# Patient Record
Sex: MALE | Race: WHITE | Employment: FULL TIME | URBAN - NONMETROPOLITAN AREA
[De-identification: names, ages, dates, MRNs, and addresses within clinical notes are randomized per-mention and may not be internally consistent; named-entity substitution may affect disease eponyms.]

---

## 2018-06-09 ENCOUNTER — HOSPITAL ENCOUNTER (OUTPATIENT)
Dept: LAB | Age: 48
Discharge: HOME | End: 2018-06-09
Attending: FAMILY MEDICINE | Admitting: FAMILY MEDICINE

## 2018-06-09 LAB
ALBUMIN SERPL-MCNC: 4 GM/DL (ref 3.4–5)
ALP BLD-CCNC: 111 IU/L (ref 40–129)
ALT SERPL-CCNC: 27 U/L (ref 10–40)
ANION GAP SERPL CALCULATED.3IONS-SCNC: 11 MMOL/L (ref 4–16)
AST SERPL-CCNC: 18 IU/L (ref 15–37)
BASOPHILS ABSOLUTE: 0.1 K/CU MM
BASOPHILS RELATIVE PERCENT: 0.6 % (ref 0–1)
BILIRUB SERPL-MCNC: 0.3 MG/DL (ref 0–1)
BUN BLDV-MCNC: 10 MG/DL (ref 6–23)
CALCIUM SERPL-MCNC: 8.8 MG/DL (ref 8.3–10.6)
CHLORIDE BLD-SCNC: 102 MMOL/L (ref 99–110)
CHOLESTEROL, FASTING: 148 MG/DL
CO2: 30 MMOL/L (ref 21–32)
CREAT SERPL-MCNC: 0.8 MG/DL (ref 0.9–1.3)
DIFFERENTIAL TYPE: ABNORMAL
EOSINOPHILS ABSOLUTE: 0.5 K/CU MM
EOSINOPHILS RELATIVE PERCENT: 5 % (ref 0–3)
GFR AFRICAN AMERICAN: >60 ML/MIN/1.73M2
GFR NON-AFRICAN AMERICAN: >60 ML/MIN/1.73M2
GLUCOSE FASTING: 110 MG/DL (ref 70–99)
HCT VFR BLD CALC: 52.6 % (ref 42–52)
HDLC SERPL-MCNC: 29 MG/DL
HEMOGLOBIN: 16.7 GM/DL (ref 13.5–18)
IMMATURE NEUTROPHIL %: 0.3 % (ref 0–0.43)
LDL CHOLESTEROL DIRECT: 96 MG/DL
LYMPHOCYTES ABSOLUTE: 2.8 K/CU MM
LYMPHOCYTES RELATIVE PERCENT: 27.6 % (ref 24–44)
MCH RBC QN AUTO: 27.6 PG (ref 27–31)
MCHC RBC AUTO-ENTMCNC: 31.7 % (ref 32–36)
MCV RBC AUTO: 86.8 FL (ref 78–100)
MONOCYTES ABSOLUTE: 0.8 K/CU MM
MONOCYTES RELATIVE PERCENT: 8 % (ref 0–4)
PDW BLD-RTO: 14.5 % (ref 11.7–14.9)
PLATELET # BLD: 274 K/CU MM (ref 140–440)
PMV BLD AUTO: 10.3 FL (ref 7.5–11.1)
POTASSIUM SERPL-SCNC: 4.3 MMOL/L (ref 3.5–5.1)
RBC # BLD: 6.06 M/CU MM (ref 4.6–6.2)
SEGMENTED NEUTROPHILS ABSOLUTE COUNT: 6 K/CU MM
SEGMENTED NEUTROPHILS RELATIVE PERCENT: 58.5 % (ref 36–66)
SODIUM BLD-SCNC: 143 MMOL/L (ref 135–145)
TOTAL IMMATURE NEUTOROPHIL: 0.03 K/CU MM
TOTAL PROTEIN: 6.8 GM/DL (ref 6.4–8.2)
TRIGLYCERIDE, FASTING: 177 MG/DL
WBC # BLD: 10.3 K/CU MM (ref 4–10.5)

## 2020-03-30 ENCOUNTER — HOSPITAL ENCOUNTER (EMERGENCY)
Age: 50
Discharge: ANOTHER HOSPITAL | End: 2020-03-30
Attending: EMERGENCY MEDICINE
Payer: COMMERCIAL

## 2020-03-30 ENCOUNTER — APPOINTMENT (OUTPATIENT)
Dept: CT IMAGING | Age: 50
End: 2020-03-30
Payer: COMMERCIAL

## 2020-03-30 ENCOUNTER — APPOINTMENT (OUTPATIENT)
Dept: GENERAL RADIOLOGY | Age: 50
End: 2020-03-30
Payer: COMMERCIAL

## 2020-03-30 VITALS
WEIGHT: 225 LBS | DIASTOLIC BLOOD PRESSURE: 82 MMHG | BODY MASS INDEX: 32.21 KG/M2 | SYSTOLIC BLOOD PRESSURE: 127 MMHG | TEMPERATURE: 97.7 F | HEIGHT: 70 IN | RESPIRATION RATE: 17 BRPM | OXYGEN SATURATION: 96 % | HEART RATE: 91 BPM

## 2020-03-30 LAB
ALBUMIN SERPL-MCNC: 3.5 GM/DL (ref 3.4–5)
ALP BLD-CCNC: 92 IU/L (ref 40–129)
ALT SERPL-CCNC: 22 U/L (ref 10–40)
ANION GAP SERPL CALCULATED.3IONS-SCNC: 11 MMOL/L (ref 4–16)
AST SERPL-CCNC: 16 IU/L (ref 15–37)
BASOPHILS ABSOLUTE: 0.1 K/CU MM
BASOPHILS RELATIVE PERCENT: 0.7 % (ref 0–1)
BILIRUB SERPL-MCNC: 0.1 MG/DL (ref 0–1)
BUN BLDV-MCNC: 12 MG/DL (ref 6–23)
CALCIUM SERPL-MCNC: 8 MG/DL (ref 8.3–10.6)
CHLORIDE BLD-SCNC: 95 MMOL/L (ref 99–110)
CHP ED QC CHECK: YES
CO2: 24 MMOL/L (ref 21–32)
CREAT SERPL-MCNC: 0.7 MG/DL (ref 0.9–1.3)
DIFFERENTIAL TYPE: ABNORMAL
EOSINOPHILS ABSOLUTE: 0.3 K/CU MM
EOSINOPHILS RELATIVE PERCENT: 4.4 % (ref 0–3)
GFR AFRICAN AMERICAN: >60 ML/MIN/1.73M2
GFR NON-AFRICAN AMERICAN: >60 ML/MIN/1.73M2
GLUCOSE BLD-MCNC: 128 MG/DL
GLUCOSE BLD-MCNC: 128 MG/DL (ref 70–99)
GLUCOSE BLD-MCNC: 134 MG/DL (ref 70–99)
HCT VFR BLD CALC: 28.4 % (ref 42–52)
HEMOGLOBIN: 8.9 GM/DL (ref 13.5–18)
IMMATURE NEUTROPHIL %: 0.3 % (ref 0–0.43)
INR BLD: 0.94 INDEX
LYMPHOCYTES ABSOLUTE: 1.9 K/CU MM
LYMPHOCYTES RELATIVE PERCENT: 24.8 % (ref 24–44)
MCH RBC QN AUTO: 27.3 PG (ref 27–31)
MCHC RBC AUTO-ENTMCNC: 31.3 % (ref 32–36)
MCV RBC AUTO: 87.1 FL (ref 78–100)
MONOCYTES ABSOLUTE: 0.6 K/CU MM
MONOCYTES RELATIVE PERCENT: 7.7 % (ref 0–4)
NUCLEATED RBC %: 0 %
PDW BLD-RTO: 14.6 % (ref 11.7–14.9)
PLATELET # BLD: 150 K/CU MM (ref 140–440)
PMV BLD AUTO: 10.4 FL (ref 7.5–11.1)
POTASSIUM SERPL-SCNC: 3.7 MMOL/L (ref 3.5–5.1)
PROTHROMBIN TIME: 11.4 SECONDS (ref 11.7–14.5)
RBC # BLD: 3.26 M/CU MM (ref 4.6–6.2)
SEGMENTED NEUTROPHILS ABSOLUTE COUNT: 4.8 K/CU MM
SEGMENTED NEUTROPHILS RELATIVE PERCENT: 62.1 % (ref 36–66)
SODIUM BLD-SCNC: 130 MMOL/L (ref 135–145)
TOTAL IMMATURE NEUTOROPHIL: 0.02 K/CU MM
TOTAL NUCLEATED RBC: 0 K/CU MM
TOTAL PROTEIN: 6 GM/DL (ref 6.4–8.2)
TROPONIN T: <0.01 NG/ML
WBC # BLD: 7.7 K/CU MM (ref 4–10.5)

## 2020-03-30 PROCEDURE — 84484 ASSAY OF TROPONIN QUANT: CPT

## 2020-03-30 PROCEDURE — 80053 COMPREHEN METABOLIC PANEL: CPT

## 2020-03-30 PROCEDURE — 85610 PROTHROMBIN TIME: CPT

## 2020-03-30 PROCEDURE — 6360000004 HC RX CONTRAST MEDICATION: Performed by: EMERGENCY MEDICINE

## 2020-03-30 PROCEDURE — 82962 GLUCOSE BLOOD TEST: CPT

## 2020-03-30 PROCEDURE — 71045 X-RAY EXAM CHEST 1 VIEW: CPT

## 2020-03-30 PROCEDURE — 70498 CT ANGIOGRAPHY NECK: CPT

## 2020-03-30 PROCEDURE — 93005 ELECTROCARDIOGRAM TRACING: CPT | Performed by: EMERGENCY MEDICINE

## 2020-03-30 PROCEDURE — 99285 EMERGENCY DEPT VISIT HI MDM: CPT

## 2020-03-30 PROCEDURE — 70450 CT HEAD/BRAIN W/O DYE: CPT

## 2020-03-30 PROCEDURE — 85025 COMPLETE CBC W/AUTO DIFF WBC: CPT

## 2020-03-30 RX ORDER — 0.9 % SODIUM CHLORIDE 0.9 %
50 INTRAVENOUS SOLUTION INTRAVENOUS ONCE
Status: DISCONTINUED | OUTPATIENT
Start: 2020-03-30 | End: 2020-03-31 | Stop reason: HOSPADM

## 2020-03-30 RX ADMIN — IOPAMIDOL 100 ML: 755 INJECTION, SOLUTION INTRAVENOUS at 21:36

## 2020-03-30 ASSESSMENT — PAIN DESCRIPTION - LOCATION: LOCATION: HEAD

## 2020-03-30 ASSESSMENT — PAIN DESCRIPTION - PAIN TYPE: TYPE: ACUTE PAIN

## 2020-03-30 ASSESSMENT — PAIN SCALES - GENERAL: PAINLEVEL_OUTOF10: 6

## 2020-03-31 PROCEDURE — 93010 ELECTROCARDIOGRAM REPORT: CPT | Performed by: INTERNAL MEDICINE

## 2020-03-31 NOTE — ED NOTES
Pt ambulating into ED with wife. Wife speaking for pt. States he has been complaining of headache for the last couple of days and has been more quiet than usual. This evening approx 1hr PTA states he was becoming angry and was attempting to talk slurring and was unable to gather thoughts. States this causing some sentences not making sense. Pt quiet most of triage and attempting to answer questions. No slurred speech noted during this time and occasionally unable to form words he was attempting to say. When asked how much he weighed pt reports 125. When asked if he was 225 he agrees and wife attempted to correct him, pt upset and reports that's what I said.       Perico Chiu RN  03/30/20 2122

## 2020-03-31 NOTE — ED PROVIDER NOTES
Triage Chief Complaint:   Headache and Aphasia (wife reports unable to keep thoughts together)    Pt was seen by physician  I wore eye cover and surgical mask for pt encounter    [place]:  Geraldo Mackey is a 52 y.o. male that presents from home with wife for evaluation of slurred speech, expressive aphasia and headache. Patient's last known well is reported as 1 hour prior to ED presentation, per patient and wife. Patient wife reports that he has had constant headaches for around the last week. No known history of headaches. Today about an hour prior to ED presentation they were sitting at dinner when patient began to have some slurred speech which have since resolved and patient had trouble trying to find words. There was no acute worsening of the headaches associated with this. Patient denies chest pain shortness of breath. No weakness numbness tingling functional or motor deficit otherwise. No other neuro complaints. Patient does have history of high cholesterol and is a current smoker    ROS:  At least 10 systems reviewed and otherwise acutely negative except as in the [address] History reviewed. No pertinent past medical history. History reviewed. No pertinent surgical history.   Family History   Problem Relation Age of Onset    Cancer Mother      Social History     Socioeconomic History    Marital status:      Spouse name: Not on file    Number of children: Not on file    Years of education: Not on file    Highest education level: Not on file   Occupational History    Not on file   Social Needs    Financial resource strain: Not on file    Food insecurity     Worry: Not on file     Inability: Not on file    Transportation needs     Medical: Not on file     Non-medical: Not on file   Tobacco Use    Smoking status: Current Every Day Smoker     Packs/day: 1.00     Years: 20.00     Pack years: 20.00    Smokeless tobacco: Never Used   Substance and Sexual Activity    Alcohol use: No    Drug use: No    Sexual activity: Not on file   Lifestyle    Physical activity     Days per week: Not on file     Minutes per session: Not on file    Stress: Not on file   Relationships    Social connections     Talks on phone: Not on file     Gets together: Not on file     Attends Druze service: Not on file     Active member of club or organization: Not on file     Attends meetings of clubs or organizations: Not on file     Relationship status: Not on file    Intimate partner violence     Fear of current or ex partner: Not on file     Emotionally abused: Not on file     Physically abused: Not on file     Forced sexual activity: Not on file   Other Topics Concern    Not on file   Social History Narrative    Not on file     No current facility-administered medications for this encounter. Current Outpatient Medications   Medication Sig Dispense Refill    Atorvastatin Calcium (LIPITOR PO) Take by mouth daily      erythromycin (ROMYCIN) 5 MG/GM ophthalmic ointment Apply 1 cm ribbon to the lower lid every 6 hours for 7 days 1 Tube 0    sertraline (ZOLOFT) 50 MG tablet Take 50 mg by mouth daily.  Simvastatin (ZOCOR PO) Take  by mouth. No Known Allergies    Nursing Notes Reviewed    Physical Exam:  ED Triage Vitals   Enc Vitals Group      BP 03/30/20 2114 (!) 167/96      Pulse 03/30/20 2114 91      Resp 03/30/20 2114 17      Temp --       Temp src --       SpO2 03/30/20 2114 96 %      Weight 03/30/20 2106 225 lb (102.1 kg)      Height 03/30/20 2106 5' 10\" (1.778 m)      Head Circumference --       Peak Flow --       Pain Score --       Pain Loc --       Pain Edu? --       Excl. in [address]? --      GENERAL APPEARANCE: Awake and alert. Cooperative. HEAD: Normocephalic. Atraumatic. EYES: EOM's grossly intact. Sclera anicteric. ENT: Mucous membranes are moist. Tolerates saliva. No trismus. NECK: Supple. No meningismus. Trachea midline. HEART: RRR. Radial pulses 2+. LUNGS: Respirations unlabored. CTAB  ABDOMEN: Soft. Non-tender. No guarding or rebound. EXTREMITIES: No acute deformities. SKIN: Warm and dry. NEUROLOGICAL:   Brooke Wisdom's NIH Stroke Scale at 9:20 PM is:  Level of Consciousness:  0 - alert; keenly responsive    LOC Questions:  0 - answers both questions correctly    LOC Commands:  0 - performs both tasks correctly    Best Gaze:  0 - normal    Visual Fields:  0 - no visual loss    Facial Palsy:  0 - normal symmetric movement    Motor-Arm-Left:  0 - no drift, limb holds 90 (or 45) degrees for full 10 seconds    Motor-Leg-Left:  0 - no drift; leg holds 30 degree position for full 5 seconds    Motor-Arm-Right:  0 - no drift, limb holds 90 (or 45) degrees for full 10 seconds    Motor-Leg-Right:  0 - no drift; leg holds 30 degree position for full 5 seconds    Limb Ataxia:  0 - absent    Sensory:  0 - normal; no sensory loss    Best Language:  1 - mild to moderate aphasia; some obvious loss of fluency or facility of comprehension without significant limitation on ideas expressed or form of expression. Reduction of speech and/or comprehension, however, makes conversation about provided materials difficult or impossible. For example, in conversation about provided materials, examiner can identify picture or naming card content from patient's response. Dysarthria:  0 - normal    Extinction and Inattention:  0 - no abnormality  PSYCHIATRIC: Normal mood.     I have reviewed and interpreted all of the currently available lab results from this visit (if applicable):  Results for orders placed or performed during the hospital encounter of 03/30/20   CBC Auto Differential   Result Value Ref Range    WBC 7.7 4.0 - 10.5 K/CU MM    RBC 3.26 (L) 4.6 - 6.2 M/CU MM    Hemoglobin 8.9 (L) 13.5 - 18.0 GM/DL    Hematocrit 28.4 (L) 42 - 52 %    MCV 87.1 78 - 100 FL    MCH 27.3 27 - 31 PG    MCHC 31.3 (L) 32.0 - 36.0 %    RDW 14.6 11.7 - 14.9 %    Platelets 636 832 - 158 K/CU MM    MPV 10.4 7.5 - 11.1 FL Differential Type AUTOMATED DIFFERENTIAL     Segs Relative 62.1 36 - 66 %    Lymphocytes % 24.8 24 - 44 %    Monocytes % 7.7 (H) 0 - 4 %    Eosinophils % 4.4 (H) 0 - 3 %    Basophils % 0.7 0 - 1 %    Segs Absolute 4.8 K/CU MM    Lymphocytes Absolute 1.9 K/CU MM    Monocytes Absolute 0.6 K/CU MM    Eosinophils Absolute 0.3 K/CU MM    Basophils Absolute 0.1 K/CU MM    Nucleated RBC % 0.0 %    Total Nucleated RBC 0.0 K/CU MM    Total Immature Neutrophil 0.02 K/CU MM    Immature Neutrophil % 0.3 0 - 0.43 %   Comprehensive Metabolic Panel w/ Reflex to MG   Result Value Ref Range    Sodium 130 (L) 135 - 145 MMOL/L    Potassium 3.7 3.5 - 5.1 MMOL/L    Chloride 95 (L) 99 - 110 mMol/L    CO2 24 21 - 32 MMOL/L    BUN 12 6 - 23 MG/DL    CREATININE 0.7 (L) 0.9 - 1.3 MG/DL    Glucose 134 (H) 70 - 99 MG/DL    Calcium 8.0 (L) 8.3 - 10.6 MG/DL    Alb 3.5 3.4 - 5.0 GM/DL    Total Protein 6.0 (L) 6.4 - 8.2 GM/DL    Total Bilirubin 0.1 0.0 - 1.0 MG/DL    ALT 22 10 - 40 U/L    AST 16 15 - 37 IU/L    Alkaline Phosphatase 92 40 - 129 IU/L    GFR Non-African American >60 >60 mL/min/1.73m2    GFR African American >60 >60 mL/min/1.73m2    Anion Gap 11 4 - 16   Troponin   Result Value Ref Range    Troponin T <0.010 <0.01 NG/ML   Protime-INR   Result Value Ref Range    Protime 11.4 (L) 11.7 - 14.5 SECONDS    INR 0.94 INDEX   POCT Glucose   Result Value Ref Range    Glucose 128 mg/dL    QC OK?  yes    POCT Glucose   Result Value Ref Range    POC Glucose 128 (H) 70 - 99 MG/DL   EKG 12 Lead   Result Value Ref Range    Ventricular Rate 83 BPM    Atrial Rate 83 BPM    P-R Interval 152 ms    QRS Duration 94 ms    Q-T Interval 356 ms    QTc Calculation (Bazett) 418 ms    P Axis 77 degrees    R Axis 82 degrees    T Axis 89 degrees    Diagnosis       Normal sinus rhythm  Normal ECG  No previous ECGs available  Confirmed by NIKI Chambers ([id number]) on 3/31/2020 5:33:40 PM          Radiographs (if obtained):  [] The following radiograph was interpreted by myself in the absence of a radiologist:  [x] Radiologist's Report Reviewed:  Ct Head Wo Contrast    Addendum Date: 3/30/2020    ADDENDUM: After further review of the images, there appears to be a small amount of subarachnoid hemorrhage within the left frontal lobe (image 46 series 2). These findings were relayed to Alejo Lee at 9:53 p.m. Result Date: 3/30/2020  EXAMINATION: CT OF THE HEAD WITHOUT CONTRAST  3/30/2020 9:32 pm TECHNIQUE: CT of the head was performed without the administration of intravenous contrast. Dose modulation, iterative reconstruction, and/or weight based adjustment of the mA/kV was utilized to reduce the radiation dose to as low as reasonably achievable. COMPARISON: None. HISTORY: ORDERING SYSTEM PROVIDED HISTORY: aphasia TECHNOLOGIST PROVIDED HISTORY: Has a \"code stroke\" or \"stroke alert\" been called? ->Yes Reason for exam:->aphasia Reason for Exam: slurred spech FINDINGS: BRAIN/VENTRICLES: There is no acute intracranial hemorrhage, mass effect or midline shift. No abnormal extra-axial fluid collection. The gray-white differentiation is maintained without evidence of an acute infarct. There is no evidence of hydrocephalus. Atherosclerotic changes of the intracranial vasculature. ORBITS: The visualized portion of the orbits demonstrate no acute abnormality. SINUSES: The visualized paranasal sinuses and mastoid air cells demonstrate mild mucosal thickening of the bilateral sphenoid sinuses. Hypoplasia of the frontal sinuses. SOFT TISSUES/SKULL:  No acute abnormality of the visualized skull or soft tissues. No acute intracranial abnormality. Findings were discussed with MONICA CORDOBA at 9:41 pm on 3/30/2020. Xr Chest Portable    Result Date: 3/30/2020  EXAMINATION: ONE XRAY VIEW OF THE CHEST 3/30/2020 9:35 pm COMPARISON: None.  HISTORY: ORDERING SYSTEM PROVIDED HISTORY: stroke alert TECHNOLOGIST PROVIDED HISTORY: Reason for exam:->stroke alert Reason for Exam: stroke alert Acuity: Acute Type of Exam: Initial Additional signs and symptoms: na Relevant Medical/Surgical History: na FINDINGS: HEART/MEDIASTINUM: The cardiomediastinal silhouette is within normal limits. PLEURA/LUNGS: There are no focal consolidations or pleural effusions. There is no appreciable pneumothorax. BONES/SOFT TISSUE: No acute abnormality. No radiographic evidence of acute pulmonary disease. Cta Head Neck W Contrast    Result Date: 3/30/2020  EXAMINATION: CTA OF THE HEAD AND NECK WITH CONTRAST 3/30/2020 9:36 pm: TECHNIQUE: CTA of the head and neck was performed with the administration of intravenous contrast. Multiplanar reformatted images are provided for review. MIP images are provided for review. Stenosis of the internal carotid arteries measured using NASCET criteria. Dose modulation, iterative reconstruction, and/or weight based adjustment of the mA/kV was utilized to reduce the radiation dose to as low as reasonably achievable. COMPARISON: None. HISTORY: ORDERING SYSTEM PROVIDED HISTORY: expressive aphasia TECHNOLOGIST PROVIDED HISTORY: Reason for exam:->expressive aphasia Reason for Exam: stroke aler FINDINGS: CTA NECK: AORTIC ARCH/ARCH VESSELS: No dissection or arterial injury. No significant stenosis of the brachiocephalic or subclavian arteries. CAROTID ARTERIES: No dissection, arterial injury, or hemodynamically significant stenosis by NASCET criteria. VERTEBRAL ARTERIES: No dissection, arterial injury, or significant stenosis. SOFT TISSUES: There is pulmonary emphysema. BONES: No acute osseous abnormality. CTA HEAD: This portion of the study is extremely limited by suboptimal contrast bolus. The hmumhr-th-Tuidor appears to be diffusely attenuated. A significant portion of the M1 segment of the left middle cerebral artery, in particular, is not well visualized. There appears to be subarachnoid blood within the high left frontal lobe.      No flow limiting stenosis or large vessel occlusion occasionally words are mis-transcribed.)    CRUZ Flowers PA-C  04/01/20 2021

## 2020-03-31 NOTE — ED PROVIDER NOTES
Emergency [address] EMERGENCY DEPARTMENT    Patient: Roselia Mcgowan  MRN: [id number]  : 1970  Date of Evaluation: 3/30/2020  ED Supervising Physician: Ras Sevilla MD    I independently examined and evaluated Roselia Mcgowan. In brief, Roselia Mcgowan is a 52 y.o. male that presents to the emergency department after onset of difficulty with speech around 8 PM.  He has been having intermittent headaches for about a week as well. Focused exam: Expressive aphasia. No dysarthria. No gaze palsy. Moves all extremities. No gross sensory deficits. Pupils equal and reactive. Brief ED course/MDM: Patient presents with concerns for acute CVA based on findings of expressive aphasia. Vital signs are appropriate at this time. Glucose within appropriate ranges. CT head does not show any evidence of obvious mass or acute bleed. On reevaluation, the patient's symptoms appear to have rapidly resolved. He is no longer having any word finding difficulty and states that he does feel better. TPA is held at bedside pending [facility] neurology consultation but anticipate the patient will be admitted here at [facility] for TIA work-up. Radiology called back and stated that they were concerned about a possible subarachnoid hemorrhage in the left frontal region. Patient's blood pressure now is in the 120s over 80s. He is still asymptomatic. He will be transferred to [facility] for urgent evaluation. 12 lead EKG as interpreted by me reveals normal sinus rhythm. Axis is normal. There are no ischemic ST elevations or other suspicious ST changes;  QRS interval is narrow, QT interval is not prolonged. Final interpretation: Normal sinus rhythm. I directly delivered medical care to this critically ill patient. Timely evaluation and treatment was necessary to address the significant organ system dysfunction present in this patient.  Due to high probability of clinically significant, life-threatening deterioration, the patient required my highest level of preparedness to intervene emergently and I personally spent this critical care time directly and personally managing the patient. I was involved in the stabilization of this critical patient for 32 minutes. During this time I was physically present at the bedside during my initial exam and for re-examinations at intervals coordinating this patient's care with other physicians, examining radiographs, interpreting electrocardiograms and rhythm strips, reviewing laboratory results, discussing the patient's condition and management with the patient/family. Time billed does not include time for procedures. All diagnostic, treatment, and disposition decisions were made by myself in conjunction with the MARIAN. For all further details of the patient's emergency department visit, please see their documentation.     (Please note that portions of this note may have been completed with a voice recognition program. Efforts were made to edit the dictations but occasionally words are mis-transcribed.)    MD Uma Tyson MD  03/30/20 9897

## 2020-04-06 LAB
EKG ATRIAL RATE: 83 BPM
EKG DIAGNOSIS: NORMAL
EKG P AXIS: 77 DEGREES
EKG P-R INTERVAL: 152 MS
EKG Q-T INTERVAL: 356 MS
EKG QRS DURATION: 94 MS
EKG QTC CALCULATION (BAZETT): 418 MS
EKG R AXIS: 82 DEGREES
EKG T AXIS: 89 DEGREES
EKG VENTRICULAR RATE: 83 BPM

## 2020-07-13 ENCOUNTER — HOSPITAL ENCOUNTER (OUTPATIENT)
Dept: OCCUPATIONAL THERAPY | Age: 50
Setting detail: THERAPIES SERIES
Discharge: HOME | End: 2020-07-13
Payer: COMMERCIAL

## 2020-07-13 PROCEDURE — 97165 OT EVAL LOW COMPLEX 30 MIN: CPT

## 2020-07-13 ASSESSMENT — 9 HOLE PEG TEST
TESTTIME_SECONDS: 22
TESTTIME_SECONDS: 26

## 2020-07-13 NOTE — PROGRESS NOTES
(degrees)  RUE AROM : WNL  Right Hand AROM (degrees)  Right Hand AROM: WNL  LUE Strength  Gross LUE Strength:  WNL  RUE Strength  Gross RUE Strength: WNL     Hand Dominance  Hand Dominance: Right  Left Hand Strength -  (lbs)  Handle Setting 2: 61.7  Right Hand Strength -  (lbs)  Handle Setting 1: 68.2  Fine Motor Skills  Left 9 Hole Peg Test Time (secs): 22  Right 9 Hole Peg Test Time (secs): 26        Assessment   Assessment  Assessment: No OT needs identifed  Decision Making: Low Complexity  History: DM  Exam: no performance deficits  Assistance / Modification: independent with ADL/ mobility  REQUIRES OT FOLLOW UP: No  No Skilled OT: Independent with ADL's;No OT goals identified  Discharge Recommendations: Defer OT at this time       Plan : Discharge OT     OutComes Score  9 hole peg test      Therapy Time   Individual Concurrent Group Co-treatment   Time In 1430         Time Out 1500         Minutes 30                 Arielle Huber OTR/L

## 2020-07-20 ENCOUNTER — HOSPITAL ENCOUNTER (OUTPATIENT)
Dept: SPEECH THERAPY | Age: 50
Setting detail: THERAPIES SERIES
Discharge: HOME | End: 2020-07-20
Payer: COMMERCIAL

## 2020-07-20 PROCEDURE — 92523 SPEECH SOUND LANG COMPREHEN: CPT

## 2020-07-20 NOTE — PROGRESS NOTES
Speech Language Pathology  Facility/Department: [facility] SPEECH THERAPY  Initial Assessment    NAME: Randi Herrmann  : 1970  MRN: [id number]    Date of Eval: 2020  Evaluating Therapist:  Abelardo Blank MS CCC-SLP    Visit Diagnoses       Codes    Subarachnoid hemorrhage ([facility] [address]    -  Primary I60.9    Cognitive impairment     R41.89            Past Medical History: has no past medical history on file. Past Surgical History:  has no past surgical history on file. Primary Complaint: \" I'm having difficulty multi tasking and thinking of the right word to say\". Pain: no pain       Assessment:  Cognitive Diagnosis: Mild impairments of short term memory and attention. Executive function skills to be assessed in treatment  Aphasia Diagnosis: Mild expressive aphasia effecting word retrieval within connected speech. Diagnosis: David Shah exhibits mild impairments of short term memory. Attention is impaired and will be assessed in treatment to address the starting point of treatment. Mild expressive aphasia is characterized by difficulties with word retrieval within connected speech.  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  IADL History  Active : Yes  Mode of Transportation: Car;Truck  Education: graduate of high school  Occupation: Full time employment  Type of occupation:  for [employer] [address]: [address] Exceptions: Wears glasses for reading           Objective:     Oral/Motor  Oral Motor: Within functional limits  Auditory Comprehension  Comprehension: Within Functional Limits     Expression  Primary Mode of Expression: Verbal  Verbal Expression  Verbal Expression: Exceptions to functional limits  Divergent: To be assessed in therapy  Conversation: Mild  Interfering Components: Impaired thought organization  Effective Techniques: Word retrived strategies  Written Expression  Written Expression: (to assessed in treatment)  Motor Speech  Motor Speech: Within Functional Limits  Pragmatics/Social Functioning  Pragmatics: Within functional limits       Cognition  Orientation  Overall Orientation Status: Within Normal Limits  Attention  Attention: (patient reports difficulty with completing tasks with many parts. Will assess attention skills focusing on divided attention.)  Memory  Memory: Exceptions to [facility]  Short-term Memory: Mild  Working Memory: To be assessed in therapy  Problem Solving  Problem Solving: Within Functional Limits  Abstract Reasoning  Abstract Reasoning: Within Functional Limits  Safety/Judgement  Safety/Judgement: Within Functional Limits    Additional Assessments:    Objective:  CLQT (Cognitive Linguistic Quick Test) was administered which assesses the areas listed below. Results were as follows:    Domain Score Severity   Attention 197 WNL   Memory 150 Mild   Executive Function 31 WNL   Language 32 WNL   Visuospatial Skills 93 WNL   Clock Drawing Severity 13 WNL      Composite Severity Rating: 3.8- WNL    Although composite severity is WNL, Selina Crews exhibits impairments of attention and memory as well as word retrieval within connected speech that this assessment was not sensitive to. Within treatment, attention, word retrieval and executive function will be assessed in depth to target areas of impairment        Plan:    Goals:   Short-term Goals  Timeframe for Short-term Goals: 3 months  Goal 1:  Will demonstrate an improvement of word retrieval as measured by accurate word choice within connected speech with 95% or better accuracy within a 15 minute or longer connected speech sample. Goal 2: Attention will improve to the divided attention level for functional daily tasks relating to personal, social and vocational skills  Goal 3: Will demonstrate intact executive function skills for daily activities relating to personal, social and vocational skills  Goal 4: Will demonstrate an improvement of short term memory skills as measured by recall of three tasks to complete given a 15 minute time interval  Long-term Goals  Timeframe for Long-term Goals: 3 months  Goal 1: Functional cognitive and language skills for daily activities relating to personal, social and vocational activities  Speech Therapy Prognosis  Prognosis: Good  Prognosis Considerations: Age, Previous Level of Function, Severity of Impairments, Participation Level  Duration/Frequency of Treatment  Duration/Frequency of Treatment: 2x week for 12 weeks  Recommendations  Requires SLP Intervention: Yes  Patient Education: results of assessment, goals and plan of care were discussed with Ant Medellin . Patient Education Response: Verbalizes understanding  Requires SLP Intervention: Yes  Patient/family involved in developing goals and treatment plan: yes          Follow Up: Follow up in: 10 days as requested by patient due to his schedule. [facility].  MS Ambrose, CCC-SLP  Speech/Language Pathologist  7/20/2020  6:10 PM

## 2020-07-30 ENCOUNTER — HOSPITAL ENCOUNTER (OUTPATIENT)
Dept: SPEECH THERAPY | Age: 50
Setting detail: THERAPIES SERIES
Discharge: HOME | End: 2020-07-30
Payer: COMMERCIAL

## 2020-07-30 PROCEDURE — 97130 THER IVNTJ EA ADDL 15 MIN: CPT

## 2020-07-30 PROCEDURE — 97129 THER IVNTJ 1ST 15 MIN: CPT

## 2020-07-30 NOTE — FLOWSHEET NOTE
[][facility] [address]fr[address]      [facility] [address] Dept          Outpatient Rehab Dept     [address]       [facility] [address], [address], [address]       Terrell Ivy 61     [phone] [phone] [phone]  [][facility] [address]           Outpatient Speech Dept. [address] [address]           [phone] 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     Insurance/Certification information: [facility] [place]  Referring Physician:   RUFINO Meyers   Plan of care signed (Y/N):  Not yet       Treatment Provided: cognitive treatment    Date 2020    Time in/Time out 10:01-10:50AM    Total Timed Code Min 50 minutes    Total Treatment Minutes 50 minutes    Units  G Code applied: 3 cognitive    Subjective     Alert and cooperative    Pain level: No pain    Visit# / total visits:      GOALS:     Goal 1: Will demonstrate an improvement of word retrieval as measured by accurate word choice within connected speech with 95% or better accuracy within a 15 minute or longer connected speech sample. Provided instruction regarding evidence based treatment approach -Semantic Feature Approach.  Amara Mogran that treatment will focus on the abstract meaning of words to facilitate improve word retrieval skills. Provided examples of the abstract meaning of the word \"cold\". He was able to generate two abstract meanings of this word with minimal cues and one abstract meaning with a moderate cue. Goal 2: Attention will improve to the divided attention level for functional daily tasks relating to personal, social and vocational skills Trail making task was completed promptly for 20 steps/sequences for this task. Provided instruction regarding everyday tasks that require divided attention. Will continue this goal with functional tasks    Goal 3: Will demonstrate intact executive function skills for daily activities relating to personal, social and vocational skills Deductive reasoning task was presented - highest difficulty level (4). Smiley Oedn completed task in which he needed to assign 11 persons to a seating chart with accuracy for 1 of the 11 guests. Will continue with deductive reasoning and executive function tasks. Goal 4: Will demonstrate an improvement of short term memory skills as measured by recall of three tasks to complete given a 15 minute time interval Provided instruction and a handout regarding the Spaced Retrieval Strategy to facilitate short term memory skills. Given two tasks to recall and a 7 minute time interval, Smiley Oden recalled both tasks promptly. At the next 15 minute interval both tasks were recalled promptly and accurately. Will increase to three tasks to recall at next session.     Effective Strategies that Improve fx: Use of spaced retrieval to facilitate short term memory skills, use of word retrieval activities to expand word associations for abstract word meanings, completion of divided attention and executive function activities to further develop these skills    Barriers to progress: none    Education completed:     Goals of treatment, plan of care and home exercise activities     Home Exercise Plan:     Word retrieval, use of spaced retrieval to facilitate short term memory skills, deduction reasoning tasks      Objective Findings: see above      Interventions used this date:  [] Speech Treatment       [x] Instruction in HEP  [] Group   [] Dysphagia Treatment   [x] Cognitive Skill Hugo  [] Motor  [] Voice Treatment       []  AAC  Other:      Communication with other providers: none    Adverse Reactions to treatment: none     Treatment/Activity Tolerance:     [x]  Patient tolerated treatment well []  Patient limited by fatique    []  Patient limited by pain []  Patient limited by other medical complications   []  Other:     Patient Requires Follow-up:  [x]  Yes  []  No    Plan: [x]  Continue per plan of care []  Alter current plan (see comments)   [x]  Plan of care initiated []  Hold pending MD visit []  Discharge    Plan for Next Session:  Continue plan of care    Electronically signed by:    Alverto Dunbar.  MS Ambrose, CCC-SLP  Speech/Language Pathologist  7/30/2020  12:46 PM

## 2020-08-03 ENCOUNTER — HOSPITAL ENCOUNTER (OUTPATIENT)
Dept: SPEECH THERAPY | Age: 50
Setting detail: THERAPIES SERIES
Discharge: HOME | End: 2020-08-03
Payer: COMMERCIAL

## 2020-08-03 PROCEDURE — 97130 THER IVNTJ EA ADDL 15 MIN: CPT

## 2020-08-03 PROCEDURE — 97129 THER IVNTJ 1ST 15 MIN: CPT

## 2020-08-03 NOTE — FLOWSHEET NOTE
-Semantic Feature Approach. Instructed Debora Aponte that treatment will focus on the abstract meaning of words to facilitate improve word retrieval skills. Provided examples of the abstract meaning of the word \"cold\". He was able to generate two abstract meanings of this word with minimal cues and one abstract meaning with a moderate cue. Debora Aponte completed home practice worksheet as instructed for abstract categories- generating 5 items for categories such as \"soft\". He generated 2-5 items depending on the difficulty of the category. With additional cues, he was able to generate additional items. Within the session, he stated the similarity between three abstract items promptly and added another item promptly across five trials. Completion of analogies was accurate with 80% consistency for word choice and promptness of response. For delayed trials, he was able to generate the accurate response with a minimal cue. Goal 2: Attention will improve to the divided attention level for functional daily tasks relating to personal, social and vocational skills Trail making task was completed promptly for 20 steps/sequences for this task. Provided instruction regarding everyday tasks that require divided attention. Will continue this goal with functional tasks Divided attention task of listening to a weather report, recalling the forecast while completing a task in which he had to cross off a target symbol in a grid was challenging for Debora Aponte. He reported that he was unable to recall the details of the weather report. He did cross off the correct symbols in the grid. Will target attention at the alternating attention level at the next session. Goal 3: Will demonstrate intact executive function skills for daily activities relating to personal, social and vocational skills Deductive reasoning task was presented - highest difficulty level (4).  Debora Aponte completed task in which he needed to assign 11 persons to a seating chart with accuracy for 1 of the 11 guests. Will continue with deductive reasoning and executive function tasks. Smiley Oden completed a low level difficulty task in which he had to assign items for 7 neighbors and himself to bring to a pot luck based upon constraints contained in eight statements. He completed this task accurately and within an acceptable time interval. Completion of a level two task in which he had to schedule five different single and recurring appointments (OT, PT, ST, Lab draw and Dr) for a week given constraints was completed accurately and in a timely fashion. Goal 4: Will demonstrate an improvement of short term memory skills as measured by recall of three tasks to complete given a 15 minute time interval Provided instruction and a handout regarding the Spaced Retrieval Strategy to facilitate short term memory skills. Given two tasks to recall and a 7 minute time interval, Smiley Oden recalled both tasks promptly. At the next 15 minute interval both tasks were recalled promptly and accurately. Will increase to three tasks to recall at next session.  GOAL MET       Smiley Oden recalled three tasks that he needed to complete with promptness and accuracy given a 15 minute time interval.   Effective Strategies that Improve fx: Use of spaced retrieval to facilitate short term memory skills, use of word retrieval activities to expand word associations for abstract word meanings, completion of divided attention and executive function activities to further develop these skills Use of spaced retrieval to facilitate short term memory skills, use of word retrieval activities to expand word associations for abstract word meanings, completion of divided attention and executive function activities to further develop these skills   Barriers to progress: none none   Education completed:     Goals of treatment, plan of care and home exercise activities  Goals of treatment, plan of care and home exercise activities were discussed

## 2020-08-10 ENCOUNTER — HOSPITAL ENCOUNTER (OUTPATIENT)
Dept: SPEECH THERAPY | Age: 50
Setting detail: THERAPIES SERIES
Discharge: HOME | End: 2020-08-10
Payer: COMMERCIAL

## 2020-08-10 PROCEDURE — 97129 THER IVNTJ 1ST 15 MIN: CPT

## 2020-08-10 PROCEDURE — 97130 THER IVNTJ EA ADDL 15 MIN: CPT

## 2020-08-10 NOTE — FLOWSHEET NOTE
[][facility] [address]fr[address]      [place] [facility] [address] Dept          Outpatient Rehab Dept     [address]       [facility] [address], [address], [address]       Terrell Landrum [address]     [phone] [phone] [phone]  [][facility] Agustinrânio [address]           Outpatient Speech Dept. 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 I69.011                                                              Aphasia following nontraumatic subarachnoid hemorrhage U95.657     Insurance/Certification information: [facility] [place]  Referring Physician:   RUFINO Vu   Plan of care signed (Y/N):  Not yet       Treatment Provided: cognitive treatment    Date 7- 8-3-2020 8-6-2020 8-   Time in/Time out 10:01-10:51AM 10:02-10:57AM 10:02-10:50AM 10:01-10:55AM   Total Timed Code Min 50 minutes 55 minutes 48 minutes 54 minutes   Total Treatment Minutes 50 minutes 55 minutes 48 minutes 54 minutes   Units  G Code applied: 3 cognitive 4 cognitive 3 cognitive  4 cognitive    Subjective     Alert and cooperative Alert and cooperative Alert and cooperative Alert and cooperative   Pain level: No pain No pain No pain No pain   Visit# / total visits:  /   1/1 2/2 3/3 4/4   GOALS:       Goal 1:  Will demonstrate an improvement of word retrieval as measured by accurate word choice within connected speech with 95% or better accuracy within a 15 minute or longer connected speech sample. Provided instruction regarding evidence based treatment approach -Semantic Feature Approach. Instructed Linnea Rothman that treatment will focus on the abstract meaning of words to facilitate improve word retrieval skills. Provided examples of the abstract meaning of the word \"cold\". He was able to generate two abstract meanings of this word with minimal cues and one abstract meaning with a moderate cue. Linnea Rothman completed home practice worksheet as instructed for abstract categories- generating 5 items for categories such as \"soft\". He generated 2-5 items depending on the difficulty of the category. With additional cues, he was able to generate additional items. Within the session, he stated the similarity between three abstract items promptly and added another item promptly across five trials. Completion of analogies was accurate with 80% consistency for word choice and promptness of response. For delayed trials, he was able to generate the accurate response with a minimal cue. Linnea Rothman completed home practice activities to improve word retrieval skills with 90% accuracy for adding item to a series of three abstract words, completing analogies and generating 3 or more items for abstract categories such as \"hot\". Within the treatment session he stated opposites promptly and accurately. No noticeable word retrieval errors were noted during this treatment session. Linnea Rothman completed word retrieval activities to facilitate improved word word retrieval skills within connected speech. He completed activities such as stating opposites, adding an item to abstract categories complete analogies and  stating 3-5 items within abstract categories such as \"turn\" and \"heavy\". He generated both concrete and abstract items with reported little difficulty.  Within a 15 minute speech sample, he demonstrated accurate word choice with 100% consistency. Chase Ac reported that he rarely has difficulty with word choice the past week. GOAL MET. Goal 2: Attention will improve to the divided attention level for functional daily tasks relating to personal, social and vocational skills Trail making task was completed promptly for 20 steps/sequences for this task. Provided instruction regarding everyday tasks that require divided attention. Will continue this goal with functional tasks Divided attention task of listening to a weather report, recalling the forecast while completing a task in which he had to cross off a target symbol in a grid was challenging for Chase Ac. He reported that he was unable to recall the details of the weather report. He did cross off the correct symbols in the grid. Will target attention at the alternating attention level at the next session. Alternating attention level for math- two digit addition and subtraction-  prompt and accurate for 30 problems alternating for one row of addition and then one row of subtraction across three trials. Divided attention-  game Janette Chaves in which Chase Ac had to note when six cards of the same fruit were displayed and then hit a bell before his opponent hit the bell. Across 10 trials of the six of the same fruit appearing, he hit the bell before the therapist with 90% consistency- intact divided attention skills for this game in which quick and accurate response is needed. 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 Divided attention attention was intact for several activities which included determining the rule that there therapist was employing during card sorting task, I.e. \"all even cards\", 'all face cards\". \"all even red cards\". He completed trail making tasks with promptness and accuracy.  Chase Ac reported that he has been taking daily drives and employing a scanning the environment strategy. He reported that he feels that his attention skills are similar to his before stroke levels. GOAL MET   Goal 3: Will demonstrate intact executive function skills for daily activities relating to personal, social and vocational skills Deductive reasoning task was presented - highest difficulty level (4). Nathalie Norris completed task in which he needed to assign 11 persons to a seating chart with accuracy for 1 of the 11 guests. Will continue with deductive reasoning and executive function tasks. Nathalie Norris completed a low level difficulty task in which he had to assign items for 7 neighbors and himself to bring to a pot luck based upon constraints contained in eight statements. He completed this task accurately and within an acceptable time interval. Completion of a level two task in which he had to schedule five different single and recurring appointments (OT, PT, ST, Lab draw and Dr) for a week given constraints was completed accurately and in a timely fashion. Nathalie Norris reported that he feels that he has been completing everyday daily tasks accurately and in a timely fashion. 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 He reported that when he attempted to complete this task a month ago, he experienced difficulty with delay. He reported that his wife has said, \"you are doing really well, much better than a few weeks ago. \" Nathalie Norris reported that he continues to complete daily activities with success. Within the session, he scheduled tasks to complete given constraints for the tasks and time interval/schedule available across two trials. Scheduling was completed in a timely and accurate fashion. GOAL MET      Goal 4:  Will demonstrate an improvement of short term memory skills as measured by recall of three tasks to complete given a 15 minute time interval Provided instruction and a

## 2020-08-10 NOTE — PROGRESS NOTES
rarely has difficulty with word choice the past week. GOAL MET. Goal 2: Attention will improve to the divided attention level for functional daily tasks relating to personal, social and vocational skills- Beni Lopez completed several divided attention tasks within the treatment sessions with accuracy and promptness of response. He reported that he consistently employs a \"scanning the environment\" as he drives. He reported that he feels that his attention skills are similar to his before stroke levels. GOAL MET    Goal 3: Will demonstrate intact executive function skills for daily activities relating to personal, social and vocational skills-  Beni Lopez reported that he continues to complete daily activities with success. Within the last treatment session, he scheduled tasks to complete given constraints for the tasks and time interval/schedule available across two trials. Scheduling was completed in a timely and accurate fashion. GOAL MET    Goal 4: Will demonstrate an improvement of short term memory skills as measured by recall of three tasks to complete given a 15 minute time interval- Goal met at the second treatment session for recall of  three tasks given a 15 minute time interval. GOAL MET    Frequency/Duration:  # Days per week: [] 1 day # Weeks: [] 1 week [] 4 weeks      [x] 2 days? [x] 2 weeks [] 5 weeks     [] 3 days   [] 3 weeks [] 6 weeks     Rehab Potential: [] Excellent [x] Good [] Fair  [] Poor     Goal Status:  [x] Achieved [] Partially Achieved  [] Not Achieved     Patient Status: [] Continue per initial plan of Care     [x] Patient now discharged- treatment goals met       Electronically signed by:    Mayela Boggs MS, CCC-SLP  Speech/Language Pathologist  8/10/2020  3:44 PM

## 2020-08-17 ENCOUNTER — OFFICE VISIT (OUTPATIENT)
Dept: ORTHOPEDIC SURGERY | Age: 50
End: 2020-08-17
Payer: COMMERCIAL

## 2020-08-17 VITALS
OXYGEN SATURATION: 96 % | HEIGHT: 70 IN | HEART RATE: 76 BPM | WEIGHT: 225 LBS | BODY MASS INDEX: 32.21 KG/M2 | RESPIRATION RATE: 16 BRPM

## 2020-08-17 PROCEDURE — 99203 OFFICE O/P NEW LOW 30 MIN: CPT | Performed by: PHYSICIAN ASSISTANT

## 2020-08-17 ASSESSMENT — ENCOUNTER SYMPTOMS
RESPIRATORY NEGATIVE: 1
ALLERGIC/IMMUNOLOGIC NEGATIVE: 1
GASTROINTESTINAL NEGATIVE: 1
EYES NEGATIVE: 1

## 2020-08-17 NOTE — PROGRESS NOTES
Hearing is intact. Lymph:  no lymphedema noted in bilateral lower extremities     Skin: intact in bilateral lower extremities with no ulcerations, lesions, rash, erythema. Vascular: There are no varicosities in bilateral lower extremities, sensation present to light touch over bilateral lower extremities. Capillary refill less than 3. Musculoskeletal:   right leg/knee exam:  Inspection:      3x3 cm mass noted on the lateral aspect of the right knee  Leg alignment:     neutral  Quadriceps/hamstring atrophy:   no  Knee effusion:    mild, no   Knee erythema:   no  ROM:     Full 0-150 degrees  Posterior drawer:   no  Lateral patella glide at 30 deg's: 20%       Medial patella glide at 30 deg's: 10mm  Varus laxity at 0 and 30 deg's: no  Valguslaxity at 0 and 30 deg's: no  Recurvatum:    no  Tenderness at:   Minimal tenderness over the mass on the lateral aspect of the right knee. Knee strength is 5/5 flexion and extension. The patient can dorsiflex and plantarflex his right ankle. Patient can raise great toe. No pain is elicited with internal/external rotation of the right hip. Imaging: The patient recently had an x-ray of his right knee completed at [facility]. Patient declined an x-ray in the clinic today. An impression of the report obtained on 08/12/2020 was faxed to the clinic today. Per the report, the impression was noted to be:   1. Mild degenerative changes within the lateral joint compartment of the knee  2. Lateral soft tissue swelling. Correlate with MRI if clinically appropriate. Impression:  Right knee mass      Plan:    The patient presented to the clinic today for evaluation of a mass on the lateral  aspect of the patient's right knee. On physical exam, patient was minimally tender to palpation over the 3 x 3 cm mass. Patient was able to fully extend and flex the right knee.  The patient did recently have an x-ray completed at [facility] which showed lateral soft tissue swelling. An MRI will be ordered for the patient for further evaluation of the patient's right knee mass. Patient will follow-up in the clinic once the MRI has been obtained. Continue to weight bear as tolerated  Continue range of motion and exercises as instruction  Ice and elevate as needed  Tylenol or Motrin for pain as needed  MRI was ordered. Once completed please call the office for in person office visit. Central scheduling # [phone]    The patient was also seen and evaluated by my attending, Dr. Lucio Amato. We discussed the history, physical, and imaging as well as the patient's treatment plan. Please see their notes for further details. *Please note this report has been partially produced using speech recognition Dragon software and may contain errors related to that system including errors in grammar, punctuation, and spelling, as well as words and phrases that may be inappropriate.  If there are any questions or concerns please feel free to contact the dictating provider for clarification

## 2020-08-17 NOTE — PATIENT INSTRUCTIONS
Continue weight bear as tolerated  Continue range of motion and exercises as instruction  Ice and elevate as needed  Tylenol or Motrin for pain  MRI was ordered. Once completed please call the office for in person office visit.   Central scheduling # [phone]

## 2020-08-17 NOTE — PROGRESS NOTES
Patient describes the pain as an achy pain one lateral side of the knee. Patient states that he noticed he knot on the lateral side of the knee. Pain scale 2/10. Patient states that the pain is when he is ambulating more.

## 2020-08-22 ENCOUNTER — HOSPITAL ENCOUNTER (OUTPATIENT)
Dept: MRI IMAGING | Age: 50
Discharge: HOME | End: 2020-08-22
Payer: COMMERCIAL

## 2020-08-22 PROCEDURE — 73721 MRI JNT OF LWR EXTRE W/O DYE: CPT

## 2020-08-24 ENCOUNTER — TELEPHONE (OUTPATIENT)
Dept: ORTHOPEDIC SURGERY | Age: 50
End: 2020-08-24

## 2020-08-28 ENCOUNTER — OFFICE VISIT (OUTPATIENT)
Dept: ORTHOPEDIC SURGERY | Age: 50
End: 2020-08-28
Payer: COMMERCIAL

## 2020-08-28 VITALS — WEIGHT: 225 LBS | HEIGHT: 70 IN | RESPIRATION RATE: 18 BRPM | BODY MASS INDEX: 32.21 KG/M2

## 2020-08-28 PROCEDURE — 99213 OFFICE O/P EST LOW 20 MIN: CPT | Performed by: PHYSICIAN ASSISTANT

## 2020-08-28 RX ORDER — ATORVASTATIN CALCIUM 80 MG/1
TABLET, FILM COATED ORAL
COMMUNITY
Start: 2020-08-08

## 2020-08-28 RX ORDER — EMPAGLIFLOZIN AND LINAGLIPTIN 10; 5 MG/1; MG/1
TABLET, FILM COATED ORAL
COMMUNITY
Start: 2020-08-17

## 2020-08-28 RX ORDER — BLOOD-GLUCOSE METER
KIT MISCELLANEOUS
Status: ON HOLD | COMMUNITY
Start: 2020-05-28 | End: 2022-10-05

## 2020-08-28 RX ORDER — METFORMIN HYDROCHLORIDE 500 MG/1
TABLET, EXTENDED RELEASE ORAL
COMMUNITY
Start: 2020-06-21

## 2020-08-28 RX ORDER — LANCETS 28 GAUGE
EACH MISCELLANEOUS
Status: ON HOLD | COMMUNITY
Start: 2020-05-28 | End: 2022-10-05

## 2020-08-28 RX ORDER — ATORVASTATIN CALCIUM 40 MG/1
TABLET, FILM COATED ORAL
COMMUNITY
Start: 2020-06-10 | End: 2020-09-30

## 2020-08-28 RX ORDER — METFORMIN HYDROCHLORIDE 500 MG/1
500 TABLET, EXTENDED RELEASE ORAL DAILY
COMMUNITY
Start: 2020-02-17 | End: 2020-09-30

## 2020-08-28 RX ORDER — BUPROPION HYDROCHLORIDE 150 MG/1
150 TABLET ORAL EVERY MORNING
COMMUNITY
Start: 2020-08-22 | End: 2022-09-28

## 2020-08-28 ASSESSMENT — ENCOUNTER SYMPTOMS
RESPIRATORY NEGATIVE: 1
ALLERGIC/IMMUNOLOGIC NEGATIVE: 1
EYES NEGATIVE: 1
GASTROINTESTINAL NEGATIVE: 1

## 2020-08-28 NOTE — PATIENT INSTRUCTIONS
Continue weight-bearing as tolerated. Continue range of motion exercises as instructed. Ice and elevate as needed.   Follow up with Dr. Joya Griffin

## 2020-08-28 NOTE — PROGRESS NOTES
[facility]    HPI:  Mal Zuniga is a 48y.o. year old with a history of subarachnoid hemorrhage presenting to the clinic for follow-up of his MRI results of his right knee. Patient tates he noticed a right knee mass a few weeks ago and since last visit he has had some pain in his right knee at night that he rates 2/10 and describes as an ache. Patient states he is not taking anything for his pain. Patient does state that he thinks he may have injured his meniscus about 5 years ago. He denies any recent injury. Patient denies any instability, catching, or locking of the right knee. Patient denies any unintentional weight loss or night sweats. Patient states he does note himself occasionally limping due to his right knee mass. No past medical history on file. No past surgical history on file.     Family History   Problem Relation Age of Onset    Cancer Mother        Social History     Socioeconomic History    Marital status:      Spouse name: None    Number of children: None    Years of education: None    Highest education level: None   Occupational History    None   Social Needs    Financial resource strain: None    Food insecurity     Worry: None     Inability: None    Transportation needs     Medical: None     Non-medical: None   Tobacco Use    Smoking status: Current Every Day Smoker     Packs/day: 1.00     Years: 20.00     Pack years: 20.00    Smokeless tobacco: Never Used   Substance and Sexual Activity    Alcohol use: No    Drug use: No    Sexual activity: None   Lifestyle    Physical activity     Days per week: None     Minutes per session: None    Stress: None   Relationships    Social connections     Talks on phone: None     Gets together: None     Attends Zoroastrian service: None     Active member of club or organization: None     Attends meetings of clubs or organizations: None     Relationship status: None    Intimate partner violence Fear of current or ex partner: None     Emotionally abused: None     Physically abused: None     Forced sexual activity: None   Other Topics Concern    None   Social History Narrative    None       Current Outpatient Medications   Medication Sig Dispense Refill    atorvastatin (LIPITOR) 40 MG tablet TAKE 1 TABLET EVERY DAY BY MOUTH      buPROPion (WELLBUTRIN XL) 150 MG extended release tablet       GLYXAMBI 10-5 MG TABS TAKE 1 TABLET BY ORAL ROUTE ONCE DAILY IN THE MORNING FOR 30 DAYS      FREESTYLE LITE strip TEST TWICE A DAY      FreeStyle Lancets MISC TEST TWICE A DAY      metFORMIN (GLUCOPHAGE-XR) 500 MG extended release tablet Take 500 mg by mouth daily      metFORMIN (GLUCOPHAGE-XR) 500 MG extended release tablet TAKE 1 TABLET BY MOUTH EVERY DAY WITH EVENING MEAL      atorvastatin (LIPITOR) 80 MG tablet TAKE 1 TABLET BY MOUTH EVERY DAY      Atorvastatin Calcium (LIPITOR PO) Take by mouth daily      erythromycin (ROMYCIN) 5 MG/GM ophthalmic ointment Apply 1 cm ribbon to the lower lid every 6 hours for 7 days 1 Tube 0    sertraline (ZOLOFT) 50 MG tablet Take 50 mg by mouth daily.  Simvastatin (ZOCOR PO) Take  by mouth. No current facility-administered medications for this visit. Allergies   Allergen Reactions    Nicotine Rash     Rash to upper torso with patch        Review of Systems:    Review of Systems   Constitutional: Negative. HENT: Negative. Eyes: Negative. Respiratory: Negative. Cardiovascular: Negative. Gastrointestinal: Negative. Endocrine: Negative. Genitourinary: Negative. Musculoskeletal: Positive for joint swelling (Right knee mass). Skin: Negative. Allergic/Immunologic: Negative. Neurological: Negative. Hematological: Negative. Psychiatric/Behavioral: Negative.         Physical Exam:   Resp 18   Ht 5' 10\" (1.778 m)   Wt 225 lb (102.1 kg)   BMI 32.28 kg/m²       Gait is Normal. The patient can bear weight on the injured meniscus and a large 4.3 cm para meniscal cyst. The MRI results were discussed with the patient and all questions were answered. On physical exam, the knee mass appeared unchanged and there was mild tenderness over the inferior aspect of the right knee mass, no erythema noted. The patient asked whether he could continue using his exercise bike and I informed the the patient to avoid sudden movements such as pivoting and that he could weight-bear as tolerated. The patient's wife inquired about whether tylenol or ibuprofen could be taken and due to the patient's history of subarachnoid hemorrhage, I recommended the patient avoid NSAIDs at this time and to discuss this with his neurologist. The patient will return to the clinic to follow-up with Dr. Norah Cotton regarding treatment options of his right knee mass. Continue weight-bearing as tolerated. Continue range of motion exercises as instructed. Ice and elevate as needed. Follow up with Dr. Norah Cotton    *Please note this report has been partially produced using speech recognition Dragon software and may contain errors related to that system including errors in grammar, punctuation, and spelling, as well as words and phrases that may be inappropriate.  If there are any questions or concerns please feel free to contact the dictating provider for clarification

## 2020-09-21 ENCOUNTER — OFFICE VISIT (OUTPATIENT)
Dept: ORTHOPEDIC SURGERY | Age: 50
End: 2020-09-21
Payer: COMMERCIAL

## 2020-09-21 VITALS
RESPIRATION RATE: 15 BRPM | OXYGEN SATURATION: 98 % | HEART RATE: 97 BPM | BODY MASS INDEX: 32.21 KG/M2 | HEIGHT: 70 IN | WEIGHT: 225 LBS

## 2020-09-21 PROCEDURE — 99213 OFFICE O/P EST LOW 20 MIN: CPT | Performed by: ORTHOPAEDIC SURGERY

## 2020-09-21 NOTE — PROGRESS NOTES
Subjective:      Patient ID: Zak Barrios is a 48 y.o. male. Pt is here today for right knee MRI. Pt was seeing Silva PATRICIA. Pt states he noticed an increased pain in the right knee about 4 months ago. Pt states that pain today is a 5/10 will increase by the end of the day. Ice and ibuprofen will take the edge off. Pt states that pain is on the lateral aspect of the right knee. He does have a cyst on the lateral aspect of his knee when he applies pressure he will have increased pain. Pt denies any injury or accident to the right knee. Pt states resting does help with the pain. He comes in today for his first visit with me. He states that over the past 4 months he has had a slowly enlarging mass along the lateral aspect of his right knee. He is also been having a dull, aching pain primarily along the lateral aspect of his right knee. Patient denies any new injury to the involved extremity/ joint, denies numbness or tingling in the involved extremity and denies fever or chills. Review of Systems   Constitutional: Negative for activity change, chills and fever. Respiratory: Negative for chest tightness. Cardiovascular: Negative for chest pain. Musculoskeletal: Positive for arthralgias, joint swelling and myalgias. Negative for back pain and gait problem. Skin: Negative for color change, pallor, rash and wound. Neurological: Negative for weakness and numbness. No past medical history on file. Objective:   Physical Exam  Constitutional:       Appearance: He is well-developed. HENT:      Head: Normocephalic. Eyes:      Pupils: Pupils are equal, round, and reactive to light. Neck:      Musculoskeletal: Normal range of motion. Pulmonary:      Effort: Pulmonary effort is normal.   Musculoskeletal: Normal range of motion. General: Swelling and tenderness present. No deformity.       Right hip: Normal.      Left hip: Normal.      Right knee: He exhibits swelling, bony tenderness and abnormal meniscus. He exhibits normal range of motion, no effusion, no ecchymosis, no deformity, no laceration, no erythema, normal alignment, no LCL laxity, normal patellar mobility and no MCL laxity. Tenderness found. Lateral joint line tenderness noted. No medial joint line, no MCL, no LCL and no patellar tendon tenderness noted. Left knee: Normal. He exhibits normal range of motion, no swelling, no effusion, no ecchymosis, no deformity, no laceration, no erythema, normal alignment, no LCL laxity, normal patellar mobility, no bony tenderness and no MCL laxity. No tenderness found. No medial joint line, no lateral joint line, no MCL, no LCL and no patellar tendon tenderness noted. Skin:     General: Skin is warm and dry. Capillary Refill: Capillary refill takes less than 2 seconds. Coloration: Skin is not pale. Findings: No erythema or rash. Neurological:      Mental Status: He is alert and oriented to person, place, and time. Right knee-Skin intact with no erythema, ecchymosis or lacerations present. There is a 4 cm x 3 cm, freely mobile, soft, nonpulsatile mass along the lateral aspect of the right knee consistent with a large ganglion cyst.  0-140  Questionable Fabiana sign in the lateral compartment of the right knee. MRI of the right knee from August 22, 2020 reviewed by me today in the office demonstrates a horizontal tear through the posterior horn of the lateral meniscus, there is also a large associated, multiloculated para meniscal cyst along the lateral aspect of the knee capsule, medial meniscus intact, ACL PCL intact. Assessment:      Right knee lateral meniscus tear  Right knee lateral ganglion cyst      Plan:      I discussed with him today his MRI findings.   I explained to him that he does have a tear in his lateral meniscus as well as a large ganglion cyst.  At this point given his persistent and worsening symptoms despite conservative treatment and with his MRI findings I recommend surgical treatment. I discussed with him today performing right knee arthroscopy with partial lateral meniscectomy as well as open excision of his lateral ganglion cyst.  I explained risks, benefits, possible complications of the procedure and answered all questions for the patient. I explained postoperative rehabilitation protocol and expectations with the patient today. The patient understands and consents to the procedure. Patient will follow up with their primary care physician prior to surgical treatment for preoperative clearance. We will schedule surgery at soonest convenience. Continue weight-bearing as tolerated. Continue range of motion exercises as instructed. Ice and elevate as needed. Tylenol or Motrin for pain. Follow up in 2 weeks postop.           Sebastian 97, DO

## 2020-09-21 NOTE — PATIENT INSTRUCTIONS
Continue weight-bearing as tolerated. Continue range of motion exercises as instructed. Ice and elevate as needed. Tylenol or Motrin for pain. We will schedule surgery at [facility] for a knee scope, lateral meniscus repair and cyst removal    If you have any questions regarding your surgery please call our office and ask to speak with Abbey.  [phone]

## 2020-09-22 ASSESSMENT — ENCOUNTER SYMPTOMS
COLOR CHANGE: 0
CHEST TIGHTNESS: 0
BACK PAIN: 0

## 2020-09-23 ENCOUNTER — TELEPHONE (OUTPATIENT)
Dept: ORTHOPEDIC SURGERY | Age: 50
End: 2020-09-23

## 2020-09-23 RX ORDER — CEPHALEXIN 250 MG/1
250 CAPSULE ORAL 4 TIMES DAILY
Qty: 4 CAPSULE | Refills: 0 | Status: SHIPPED | OUTPATIENT
Start: 2020-09-23 | End: 2020-09-24

## 2020-09-23 RX ORDER — HYDROCODONE BITARTRATE AND ACETAMINOPHEN 5; 325 MG/1; MG/1
1 TABLET ORAL EVERY 6 HOURS PRN
Qty: 10 TABLET | Refills: 0 | Status: SHIPPED | OUTPATIENT
Start: 2020-09-23 | End: 2020-09-26

## 2020-09-23 NOTE — TELEPHONE ENCOUNTER
Called patient's insurance spoke with Emmanuel Silverio. No prior Debbora Grist is needed for cpt codes: 72432 and 76907 . They do have a frequency of 1 and 1 day allowed.   Ref# [id number]

## 2020-09-24 ENCOUNTER — HOSPITAL ENCOUNTER (OUTPATIENT)
Age: 50
Setting detail: SPECIMEN
Discharge: HOME | End: 2020-09-24
Payer: COMMERCIAL

## 2020-09-24 PROCEDURE — U0002 COVID-19 LAB TEST NON-CDC: HCPCS

## 2020-09-25 LAB
SARS-COV-2: NOT DETECTED
SOURCE: NORMAL

## 2020-09-29 ENCOUNTER — TELEPHONE (OUTPATIENT)
Dept: ORTHOPEDIC SURGERY | Age: 50
End: 2020-09-29

## 2020-09-29 ENCOUNTER — ANESTHESIA EVENT (OUTPATIENT)
Dept: OPERATING ROOM | Age: 50
End: 2020-09-29
Payer: COMMERCIAL

## 2020-09-29 NOTE — TELEPHONE ENCOUNTER
Called Dr Sonya Mayo office and left message letting them know that the patient needs medical clearance for his surgery that is coming up this Thursday 10/1/2020. I also faxed over the medical assessment. I call the patient and let him know that Dr Enrique Montague would like a medical clearance from Dr Verita Lundborg and asked the patient to call his PCP to get that done before surgery.

## 2020-09-29 NOTE — ANESTHESIA PRE PROCEDURE
Department of Anesthesiology  Preprocedure Note       Name:  Tamara Lea   Age:  48 y.o.  :  1970                                          MRN:  [id number]         Date:  2020      Surgeon: Vicky Ronquillo):  Hans Molina DO    Procedure: Procedure(s):  RIGHT KNEE ARTHROSCOPY , LATERAL MENISCECTOMY , LATERAL OPEN , CYST EXCISION    Medications prior to admission:   Prior to Admission medications    Medication Sig Start Date End Date Taking? Authorizing Provider   atorvastatin (LIPITOR) 40 MG tablet TAKE 1 TABLET EVERY DAY BY MOUTH 6/10/20   Historical Provider, MD   buPROPion (WELLBUTRIN XL) 150 MG extended release tablet  20   Historical Provider, MD   GLYXAMBI 10-5 MG TABS TAKE 1 TABLET BY ORAL ROUTE ONCE DAILY IN THE MORNING FOR 30 DAYS 20   Historical Provider, MD   FREESTYLE LITE strip TEST TWICE A DAY 20   Historical Provider, MD   FreeStyle Lancets MISC TEST TWICE A DAY 20   Historical Provider, MD   metFORMIN (GLUCOPHAGE-XR) 500 MG extended release tablet Take 500 mg by mouth daily 20   Historical Provider, MD   metFORMIN (GLUCOPHAGE-XR) 500 MG extended release tablet TAKE 1 TABLET BY MOUTH EVERY DAY WITH EVENING MEAL 20   Historical Provider, MD   atorvastatin (LIPITOR) 80 MG tablet TAKE 1 TABLET BY MOUTH EVERY DAY 20   Historical Provider, MD   Atorvastatin Calcium (LIPITOR PO) Take by mouth daily    Historical Provider, MD   erythromycin (ROMYCIN) 5 MG/GM ophthalmic ointment Apply 1 cm ribbon to the lower lid every 6 hours for 7 days 17   Pop Ceballos PA-C   sertraline (ZOLOFT) 50 MG tablet Take 50 mg by mouth daily. Historical Provider, MD   Simvastatin (ZOCOR PO) Take  by mouth. Historical Provider, MD       Current medications:    No current facility-administered medications for this encounter.       Current Outpatient Medications   Medication Sig Dispense Refill    atorvastatin (LIPITOR) 40 MG tablet TAKE 1 TABLET EVERY DAY BY MOUTH      (102.1 kg)     There is no height or weight on file to calculate BMI.    CBC:   Lab Results   Component Value Date    WBC 7.7 03/30/2020    RBC 3.26 03/30/2020    HGB 8.9 03/30/2020    HCT 28.4 03/30/2020    MCV 87.1 03/30/2020    RDW 14.6 03/30/2020     03/30/2020       CMP:   Lab Results   Component Value Date     03/30/2020    K 3.7 03/30/2020    CL 95 03/30/2020    CO2 24 03/30/2020    BUN 12 03/30/2020    CREATININE 0.7 03/30/2020    GFRAA >60 03/30/2020    LABGLOM >60 03/30/2020    GLUCOSE 128 03/30/2020    PROT 6.0 03/30/2020    CALCIUM 8.0 03/30/2020    BILITOT 0.1 03/30/2020    ALKPHOS 92 03/30/2020    AST 16 03/30/2020    ALT 22 03/30/2020       POC Tests: No results for input(s): POCGLU, POCNA, POCK, POCCL, POCBUN, POCHEMO, POCHCT in the last 72 hours. Coags:   Lab Results   Component Value Date    PROTIME 11.4 03/30/2020    INR 0.94 03/30/2020       HCG (If Applicable): No results found for: PREGTESTUR, PREGSERUM, HCG, HCGQUANT     ABGs: No results found for: PHART, PO2ART, IRL2JMR, DBZ5VLK, BEART, B9THBBJL     Type & Screen (If Applicable):  No results found for: LABABO, LABRH    Drug/Infectious Status (If Applicable):  No results found for: HIV, HEPCAB    COVID-19 Screening (If Applicable):   Lab Results   Component Value Date    COVID19 NOT DETECTED 09/24/2020         Anesthesia Evaluation  Patient summary reviewed and Nursing notes reviewed  Airway: Mallampati: II  TM distance: <3 FB   Neck ROM: full  Mouth opening: > = 3 FB Dental: normal exam         Pulmonary:                              Cardiovascular:  Exercise tolerance: good (>4 METS),   (+) hyperlipidemia      ECG reviewed      Echocardiogram reviewed               ROS comment: Normal sinus rhythm   Normal ECG   No previous ECGs available   Confirmed by NIKI Montes ([id number]) on 3/31/2020 5:33:40 PM   Testing Performed By     · Left Ventricle: Chamber size is normal. Normal global wall motion.    Regional wall motion is normal. The ejection fraction is 65%. Ejection   fraction is normal. Diastolic function is normal.  · Right Ventricle: Chamber size is normal. Systolic function is normal.  · Septum: There is no convincing evidence of a patent foramen ovale. · No hemodynamically significant valve disease. · Pulmonary artery systolic pressure (PASP) was unable to be estimated due   to inadequate TR jet. Neuro/Psych:   (+) CVA: no interval change, depression/anxiety              ROS comment: 3/2020 hemorrhagic bleed brain, rt vasoconstriction osu, no residual deficits   GI/Hepatic/Renal:             Endo/Other:    (+) DiabetesType II DM, , : arthritis: OA., . Pt had no PAT visit       Abdominal:           Vascular: negative vascular ROS. Anesthesia Plan      general and spinal     ASA 3     (Dr. Kendrick Reyes visit 9/30 medically cleared ekg done   covid -)  Induction: intravenous. MIPS: Postoperative opioids intended and Prophylactic antiemetics administered. Anesthetic plan and risks discussed with patient. Plan discussed with CRNA and attending.     Attending anesthesiologist reviewed and agrees with Pre Eval content          RHETT Markham - CRNA   9/29/2020

## 2020-09-30 RX ORDER — FENOFIBRATE 48 MG/1
144 TABLET, COATED ORAL DAILY
COMMUNITY

## 2020-10-01 ENCOUNTER — ANESTHESIA (OUTPATIENT)
Dept: OPERATING ROOM | Age: 50
End: 2020-10-01
Payer: COMMERCIAL

## 2020-10-01 ENCOUNTER — HOSPITAL ENCOUNTER (OUTPATIENT)
Age: 50
Setting detail: OUTPATIENT SURGERY
Discharge: HOME | End: 2020-10-01
Attending: ORTHOPAEDIC SURGERY | Admitting: ORTHOPAEDIC SURGERY
Payer: COMMERCIAL

## 2020-10-01 VITALS — DIASTOLIC BLOOD PRESSURE: 63 MMHG | OXYGEN SATURATION: 90 % | TEMPERATURE: 97.7 F | SYSTOLIC BLOOD PRESSURE: 100 MMHG

## 2020-10-01 VITALS
SYSTOLIC BLOOD PRESSURE: 117 MMHG | RESPIRATION RATE: 16 BRPM | TEMPERATURE: 96.5 F | HEART RATE: 77 BPM | HEIGHT: 70 IN | WEIGHT: 220 LBS | OXYGEN SATURATION: 95 % | DIASTOLIC BLOOD PRESSURE: 65 MMHG | BODY MASS INDEX: 31.5 KG/M2

## 2020-10-01 LAB
ANION GAP SERPL CALCULATED.3IONS-SCNC: 11 MMOL/L (ref 4–16)
BUN BLDV-MCNC: 13 MG/DL (ref 6–23)
CALCIUM SERPL-MCNC: 9.2 MG/DL (ref 8.3–10.6)
CHLORIDE BLD-SCNC: 103 MMOL/L (ref 99–110)
CO2: 27 MMOL/L (ref 21–32)
CREAT SERPL-MCNC: 0.9 MG/DL (ref 0.9–1.3)
GFR AFRICAN AMERICAN: >60 ML/MIN/1.73M2
GFR NON-AFRICAN AMERICAN: >60 ML/MIN/1.73M2
GLUCOSE BLD-MCNC: 127 MG/DL (ref 70–99)
GLUCOSE BLD-MCNC: 132 MG/DL (ref 70–99)
GLUCOSE BLD-MCNC: 99 MG/DL (ref 70–99)
HCT VFR BLD CALC: 50.4 % (ref 42–52)
HEMOGLOBIN: 16 GM/DL (ref 13.5–18)
MCH RBC QN AUTO: 27.3 PG (ref 27–31)
MCHC RBC AUTO-ENTMCNC: 31.7 % (ref 32–36)
MCV RBC AUTO: 85.9 FL (ref 78–100)
PDW BLD-RTO: 14.3 % (ref 11.7–14.9)
PLATELET # BLD: 310 K/CU MM (ref 140–440)
PMV BLD AUTO: 10.5 FL (ref 7.5–11.1)
POTASSIUM SERPL-SCNC: 3.8 MMOL/L (ref 3.5–5.1)
RBC # BLD: 5.87 M/CU MM (ref 4.6–6.2)
SODIUM BLD-SCNC: 141 MMOL/L (ref 135–145)
WBC # BLD: 9.8 K/CU MM (ref 4–10.5)

## 2020-10-01 PROCEDURE — 3700000001 HC ADD 15 MINUTES (ANESTHESIA): Performed by: ORTHOPAEDIC SURGERY

## 2020-10-01 PROCEDURE — 80048 BASIC METABOLIC PNL TOTAL CA: CPT

## 2020-10-01 PROCEDURE — 2580000003 HC RX 258: Performed by: ORTHOPAEDIC SURGERY

## 2020-10-01 PROCEDURE — 7100000010 HC PHASE II RECOVERY - FIRST 15 MIN: Performed by: ORTHOPAEDIC SURGERY

## 2020-10-01 PROCEDURE — 6370000000 HC RX 637 (ALT 250 FOR IP): Performed by: ORTHOPAEDIC SURGERY

## 2020-10-01 PROCEDURE — 2709999900 HC NON-CHARGEABLE SUPPLY: Performed by: ORTHOPAEDIC SURGERY

## 2020-10-01 PROCEDURE — 27347 REMOVE KNEE CYST: CPT | Performed by: ORTHOPAEDIC SURGERY

## 2020-10-01 PROCEDURE — 7100000000 HC PACU RECOVERY - FIRST 15 MIN: Performed by: ORTHOPAEDIC SURGERY

## 2020-10-01 PROCEDURE — 7100000001 HC PACU RECOVERY - ADDTL 15 MIN: Performed by: ORTHOPAEDIC SURGERY

## 2020-10-01 PROCEDURE — 82962 GLUCOSE BLOOD TEST: CPT

## 2020-10-01 PROCEDURE — 7100000011 HC PHASE II RECOVERY - ADDTL 15 MIN: Performed by: ORTHOPAEDIC SURGERY

## 2020-10-01 PROCEDURE — 29881 ARTHRS KNE SRG MNISECTMY M/L: CPT | Performed by: ORTHOPAEDIC SURGERY

## 2020-10-01 PROCEDURE — 85027 COMPLETE CBC AUTOMATED: CPT

## 2020-10-01 PROCEDURE — 6360000002 HC RX W HCPCS: Performed by: ORTHOPAEDIC SURGERY

## 2020-10-01 PROCEDURE — 3600000014 HC SURGERY LEVEL 4 ADDTL 15MIN: Performed by: ORTHOPAEDIC SURGERY

## 2020-10-01 PROCEDURE — 6360000002 HC RX W HCPCS: Performed by: NURSE ANESTHETIST, CERTIFIED REGISTERED

## 2020-10-01 PROCEDURE — 3600000004 HC SURGERY LEVEL 4 BASE: Performed by: ORTHOPAEDIC SURGERY

## 2020-10-01 PROCEDURE — 2720000010 HC SURG SUPPLY STERILE: Performed by: ORTHOPAEDIC SURGERY

## 2020-10-01 PROCEDURE — 88304 TISSUE EXAM BY PATHOLOGIST: CPT

## 2020-10-01 PROCEDURE — 3700000000 HC ANESTHESIA ATTENDED CARE: Performed by: ORTHOPAEDIC SURGERY

## 2020-10-01 RX ORDER — OXYCODONE HYDROCHLORIDE 5 MG/1
10 TABLET ORAL EVERY 4 HOURS PRN
Status: DISCONTINUED | OUTPATIENT
Start: 2020-10-01 | End: 2020-10-01 | Stop reason: HOSPADM

## 2020-10-01 RX ORDER — ONDANSETRON 2 MG/ML
INJECTION INTRAMUSCULAR; INTRAVENOUS PRN
Status: DISCONTINUED | OUTPATIENT
Start: 2020-10-01 | End: 2020-10-01 | Stop reason: SDUPTHER

## 2020-10-01 RX ORDER — HYDROMORPHONE HCL 110MG/55ML
0.5 PATIENT CONTROLLED ANALGESIA SYRINGE INTRAVENOUS EVERY 5 MIN PRN
Status: DISCONTINUED | OUTPATIENT
Start: 2020-10-01 | End: 2020-10-01 | Stop reason: HOSPADM

## 2020-10-01 RX ORDER — FENTANYL CITRATE 50 UG/ML
INJECTION, SOLUTION INTRAMUSCULAR; INTRAVENOUS PRN
Status: DISCONTINUED | OUTPATIENT
Start: 2020-10-01 | End: 2020-10-01 | Stop reason: SDUPTHER

## 2020-10-01 RX ORDER — 0.9 % SODIUM CHLORIDE 0.9 %
500 INTRAVENOUS SOLUTION INTRAVENOUS
Status: DISCONTINUED | OUTPATIENT
Start: 2020-10-01 | End: 2020-10-01 | Stop reason: HOSPADM

## 2020-10-01 RX ORDER — SODIUM CHLORIDE 0.9 % (FLUSH) 0.9 %
10 SYRINGE (ML) INJECTION EVERY 12 HOURS SCHEDULED
Status: DISCONTINUED | OUTPATIENT
Start: 2020-10-01 | End: 2020-10-01 | Stop reason: HOSPADM

## 2020-10-01 RX ORDER — MEPERIDINE HYDROCHLORIDE 25 MG/ML
12.5 INJECTION INTRAMUSCULAR; INTRAVENOUS; SUBCUTANEOUS EVERY 5 MIN PRN
Status: DISCONTINUED | OUTPATIENT
Start: 2020-10-01 | End: 2020-10-01 | Stop reason: HOSPADM

## 2020-10-01 RX ORDER — ONDANSETRON 2 MG/ML
4 INJECTION INTRAMUSCULAR; INTRAVENOUS
Status: DISCONTINUED | OUTPATIENT
Start: 2020-10-01 | End: 2020-10-01 | Stop reason: HOSPADM

## 2020-10-01 RX ORDER — PROPOFOL 10 MG/ML
INJECTION, EMULSION INTRAVENOUS CONTINUOUS PRN
Status: DISCONTINUED | OUTPATIENT
Start: 2020-10-01 | End: 2020-10-01 | Stop reason: SDUPTHER

## 2020-10-01 RX ORDER — SODIUM CHLORIDE 0.9 % (FLUSH) 0.9 %
10 SYRINGE (ML) INJECTION PRN
Status: DISCONTINUED | OUTPATIENT
Start: 2020-10-01 | End: 2020-10-01 | Stop reason: HOSPADM

## 2020-10-01 RX ORDER — PROMETHAZINE HYDROCHLORIDE 25 MG/ML
6.25 INJECTION, SOLUTION INTRAMUSCULAR; INTRAVENOUS
Status: DISCONTINUED | OUTPATIENT
Start: 2020-10-01 | End: 2020-10-01 | Stop reason: HOSPADM

## 2020-10-01 RX ORDER — FENTANYL CITRATE 50 UG/ML
50 INJECTION, SOLUTION INTRAMUSCULAR; INTRAVENOUS EVERY 5 MIN PRN
Status: DISCONTINUED | OUTPATIENT
Start: 2020-10-01 | End: 2020-10-01 | Stop reason: HOSPADM

## 2020-10-01 RX ORDER — DIPHENHYDRAMINE HYDROCHLORIDE 50 MG/ML
12.5 INJECTION INTRAMUSCULAR; INTRAVENOUS
Status: DISCONTINUED | OUTPATIENT
Start: 2020-10-01 | End: 2020-10-01 | Stop reason: HOSPADM

## 2020-10-01 RX ORDER — KETOROLAC TROMETHAMINE 30 MG/ML
INJECTION, SOLUTION INTRAMUSCULAR; INTRAVENOUS PRN
Status: DISCONTINUED | OUTPATIENT
Start: 2020-10-01 | End: 2020-10-01 | Stop reason: SDUPTHER

## 2020-10-01 RX ORDER — LIDOCAINE HYDROCHLORIDE 20 MG/ML
INJECTION, SOLUTION INTRAVENOUS PRN
Status: DISCONTINUED | OUTPATIENT
Start: 2020-10-01 | End: 2020-10-01 | Stop reason: SDUPTHER

## 2020-10-01 RX ORDER — OXYCODONE HYDROCHLORIDE 5 MG/1
5 TABLET ORAL EVERY 4 HOURS PRN
Status: DISCONTINUED | OUTPATIENT
Start: 2020-10-01 | End: 2020-10-01 | Stop reason: HOSPADM

## 2020-10-01 RX ORDER — SODIUM CHLORIDE, SODIUM LACTATE, POTASSIUM CHLORIDE, CALCIUM CHLORIDE 600; 310; 30; 20 MG/100ML; MG/100ML; MG/100ML; MG/100ML
INJECTION, SOLUTION INTRAVENOUS CONTINUOUS
Status: DISCONTINUED | OUTPATIENT
Start: 2020-10-01 | End: 2020-10-01 | Stop reason: HOSPADM

## 2020-10-01 RX ORDER — ROPIVACAINE HYDROCHLORIDE 5 MG/ML
INJECTION, SOLUTION EPIDURAL; INFILTRATION; PERINEURAL
Status: COMPLETED | OUTPATIENT
Start: 2020-10-01 | End: 2020-10-01

## 2020-10-01 RX ORDER — DEXAMETHASONE SODIUM PHOSPHATE 4 MG/ML
INJECTION, SOLUTION INTRA-ARTICULAR; INTRALESIONAL; INTRAMUSCULAR; INTRAVENOUS; SOFT TISSUE PRN
Status: DISCONTINUED | OUTPATIENT
Start: 2020-10-01 | End: 2020-10-01 | Stop reason: SDUPTHER

## 2020-10-01 RX ORDER — LABETALOL HYDROCHLORIDE 5 MG/ML
5 INJECTION, SOLUTION INTRAVENOUS EVERY 10 MIN PRN
Status: DISCONTINUED | OUTPATIENT
Start: 2020-10-01 | End: 2020-10-01 | Stop reason: HOSPADM

## 2020-10-01 RX ORDER — ACETAMINOPHEN 500 MG
1000 TABLET ORAL ONCE
Status: COMPLETED | OUTPATIENT
Start: 2020-10-01 | End: 2020-10-01

## 2020-10-01 RX ADMIN — DEXAMETHASONE SODIUM PHOSPHATE 4 MG: 4 INJECTION, SOLUTION INTRAMUSCULAR; INTRAVENOUS at 09:47

## 2020-10-01 RX ADMIN — SODIUM CHLORIDE, POTASSIUM CHLORIDE, SODIUM LACTATE AND CALCIUM CHLORIDE: 600; 310; 30; 20 INJECTION, SOLUTION INTRAVENOUS at 09:35

## 2020-10-01 RX ADMIN — LIDOCAINE HYDROCHLORIDE 50 MG: 20 INJECTION, SOLUTION INTRAVENOUS at 09:43

## 2020-10-01 RX ADMIN — FENTANYL CITRATE 100 MCG: 50 INJECTION INTRAMUSCULAR; INTRAVENOUS at 09:38

## 2020-10-01 RX ADMIN — ACETAMINOPHEN 1000 MG: 500 TABLET, FILM COATED ORAL at 07:51

## 2020-10-01 RX ADMIN — CEFAZOLIN SODIUM 2 G: 10 INJECTION, POWDER, FOR SOLUTION INTRAVENOUS at 09:43

## 2020-10-01 RX ADMIN — KETOROLAC TROMETHAMINE 30 MG: 30 INJECTION, SOLUTION INTRAMUSCULAR; INTRAVENOUS at 10:46

## 2020-10-01 RX ADMIN — ONDANSETRON 4 MG: 2 INJECTION INTRAMUSCULAR; INTRAVENOUS at 09:47

## 2020-10-01 RX ADMIN — SODIUM CHLORIDE, POTASSIUM CHLORIDE, SODIUM LACTATE AND CALCIUM CHLORIDE: 600; 310; 30; 20 INJECTION, SOLUTION INTRAVENOUS at 07:49

## 2020-10-01 RX ADMIN — PROPOFOL 120 MCG/KG/MIN: 10 INJECTION, EMULSION INTRAVENOUS at 09:43

## 2020-10-01 ASSESSMENT — PULMONARY FUNCTION TESTS
PIF_VALUE: 1
PIF_VALUE: 0
PIF_VALUE: 1
PIF_VALUE: 0
PIF_VALUE: 1
PIF_VALUE: 0
PIF_VALUE: 1

## 2020-10-01 ASSESSMENT — PAIN SCALES - GENERAL
PAINLEVEL_OUTOF10: 0

## 2020-10-01 ASSESSMENT — PAIN - FUNCTIONAL ASSESSMENT: PAIN_FUNCTIONAL_ASSESSMENT: 0-10

## 2020-10-01 NOTE — PROGRESS NOTES
1057- arrived to PACU in semi-fowlers position, monitors applied alarms on oriented to unit.  Handoff report received from Alejandro Hayes 82  1100- tactile tile sensation to lower abdomen, unable to move feet   1125- resting, denies pain tactile sensation to upper thigh   1135- turned and repositioned tolerated well   1145- tactile sensation to lower thigh, able to move legs side to side   1147- Phase 1 recovery complete, transferred to [facility] per bed, handoff report given to [facility]

## 2020-10-01 NOTE — PROGRESS NOTES
1155:  Pt received from PACU alert and oriented with no c/o pain or discomfort to right knee. Right knee dressed in ACE wrap with no bleeding or drainage noted. Pt able to to move bilat lower legs on command. Tolerating PO, call light with in reach.

## 2020-10-01 NOTE — PROGRESS NOTES
1215 In to check on pt. Pt denies c/o and ready to go home. Toes right foot warm, mobile, and cap refill less than 3 seconds. 1220 Pt up to side of bed with assist and walker. Pt tolerated well with walking and pt ready to get dressed to go home. Wife at bedside. Pt given Crutches and instructed pt and wife on the use of them. Call light in reach. [phone] Pt discharged to home per wheelchair to wife's private vehicle.

## 2020-10-01 NOTE — BRIEF OP NOTE
Brief Postoperative Note      Patient: Everardo Kelly  YOB: 1970  MRN: [id number]    Date of Procedure: 10/1/2020    Pre-Op Diagnosis: COMPLEX TEAR OF LATERAL MENISCUS OF RIGHT KNEE , CYST OF MENISCUS OF RIGHT KNEE    Post-Op Diagnosis: Same       Procedure(s):  RIGHT KNEE ARTHROSCOPY , LATERAL MENISCECTOMY , LATERAL OPEN , CYST EXCISION    Surgeon(s):  Val Malhotra DO    Assistant:  * No surgical staff found *    Anesthesia: General    Estimated Blood Loss (mL): Minimal    Complications: None    Specimens:   ID Type Source Tests Collected by Time Destination   A : right knee cyst Specimen Joint, Knee SURGICAL PATHOLOGY Val Malhotra DO 10/1/2020 1031        Implants:  * No implants in log *      Drains: * No LDAs found *    Findings: Right lateral meniscus tear, right knee ganglion cyst    Electronically signed by Van Govea DO on 10/1/2020 at 11:06 AM

## 2020-10-01 NOTE — ANESTHESIA POSTPROCEDURE EVALUATION
Department of Anesthesiology  Postprocedure Note    Patient: Rafiq Nino  MRN: [id number]  YOB: 1970  Date of evaluation: 10/1/2020  Time:  3:54 PM     Procedure Summary     Date:  10/01/20 Room / Location:  [place] [address] / [facility]    Anesthesia Start:  0935 Anesthesia Stop:  1273    Procedure:  RIGHT KNEE ARTHROSCOPY , LATERAL MENISCECTOMY , LATERAL OPEN , CYST EXCISION (Right Knee) Diagnosis:  (COMPLEX TEAR OF LATERAL MENISCUS OF RIGHT KNEE , CYST OF MENISCUS OF RIGHT KNEE)    Surgeon:  Reagan Lagunas DO Responsible Provider:  RHETT Vaca CRNA    Anesthesia Type:  general, spinal ASA Status:  3          Anesthesia Type: general, spinal    Christopher Phase I: Christopher Score: 8    Christopher Phase II: Christopher Score: 10    Last vitals: Reviewed and per EMR flowsheets.        Anesthesia Post Evaluation    Patient location during evaluation: PACU  Patient participation: complete - patient participated  Level of consciousness: awake  Pain score: 3  Airway patency: patent  Nausea & Vomiting: no vomiting and no nausea  Complications: no  Cardiovascular status: blood pressure returned to baseline and hemodynamically stable  Respiratory status: acceptable  Hydration status: stable

## 2020-10-01 NOTE — ANESTHESIA PROCEDURE NOTES
Spinal Block    Patient location during procedure: OR  Start time: 10/1/2020 9:38 AM  End time: 10/1/2020 9:45 AM  Reason for block: procedure for pain, primary anesthetic and at surgeon's request  Staffing  Anesthesiologist: Kal Powell MD  Resident/CRNA: RHETT Cabrera CRNA  Performed: resident/CRNA   Preanesthetic Checklist  Completed: patient identified, site marked, surgical consent, pre-op evaluation, timeout performed, IV checked, risks and benefits discussed, monitors and equipment checked, anesthesia consent given, oxygen available and patient being monitored  Spinal Block  Patient position: sitting  Prep: Betadine  Patient monitoring: cardiac monitor, continuous pulse ox and frequent blood pressure checks  Approach: midline  Location: L4/L5  Procedures: paresthesia technique  Provider prep: mask and sterile gloves  Local infiltration: lidocaine  Local anesthetic: NESECAINE 3 %  Dose: 2.4  Dose: 2.4  Needle  Needle type: Pencan   Needle gauge: 25 G  Needle length: 3.5 in  Assessment  Sensory level: T8  Swirl obtained: Yes  CSF: clear  Attempts: 1  Hemodynamics: stable

## 2020-10-01 NOTE — OP NOTE
was  performed and all in attendance were in agreement. I exsanguinated the right lower extremity using Esmarch and tourniquet was  inflated to 250 mmHg. I then made standard anteromedial and anterolateral  portal incisions and inserted arthroscopic instrumentation into the knee  joint through the anterolateral portal.  At this point, I performed a  standard diagnostic arthroscopy evaluating the patellofemoral compartment,  femoral notch, medial and lateral compartments of the knee, as well as the  medial and lateral gutters of the knee. Within the patellofemoral  compartment, I found the cartilage on the patella and the femoral trochlea  to be intact with normal tracking of the patella. I then evaluated the  femoral notch and found the ACL and PCL to be intact. I then turned my  attention to the lateral compartment of the knee. Within the lateral  compartment, I found the cartilage on the femoral condyle and tibial  plateau to be intact with only early grade 2 arthritic change on the femoral condyle. The lateral meniscus was probed and found to have a parrot-beak tear through the posterior horn of the lateral meniscus which extended into a large horizontal tear through the posterior horn and body of the lateral meniscus. I then used the arthroscopic biters to debride the parrot-beak tear as well as the lower leaflet of the horizontal tear of the posterior horn and body of the medial meniscus. I then used the arthroscopic shaver to remove all loose meniscal debris. I then used the Arthrocare wand to complete the debridement of the lateral meniscus debriding it back  to solid meniscal tissue circumferentially. I then turned my attention to the medial compartment of the knee. Within  the medial compartment, I found the cartilage on the femoral condyle and tibial plateau to be virtually intact. The medial meniscus was probed and found to be intact.       I then evaluated the medial and lateral gutters of the knee and found there to be no additional pathology present. Arthroscopic instrumentation was then removed from the knee. Excess fluid  was then drained from the knee. Skin incisions were then closed using 3-0  nylon suture. I then turned my attention to the open excision of the lateral ganglion cyst.  The feet were elevated. I then made a longitudinal incision centered over the large cyst.  I then carried sharp dissection down in vertical fashion through the IT band. At this point I encountered a large ganglion cyst which appeared to be coming off of the knee capsule. I then placed a self-retaining retractor for further visualization. I then grasped hold of the large cyst and continued dissection down to the level of the knee joint removing the cyst in its entirety. The ganglion cyst was then sent for final pathologic specimen. I found that the ganglion cyst did indeed originate off the lateral joint capsule in the location of the lateral meniscus tear. I then used 0 Vicryl suture in figure-of-eight fashion to close the hole in the joint capsule. I then closed the IT band using 0 Vicryl suture in a running fashion. I then closed subcutaneous tissue using 2-0 Vicryl suture followed by skin closure with staples. The tourniquet was then deflated for a total of 43 minutes  and adequate hemostasis was maintained. I then applied a sterile soft  dressing to the right lower extremity. The patient was then awakened from  anesthesia and transported to PACU in stable condition. He appeared to  have tolerated the procedure well. PROGNOSIS:  At this point, he will be discharged to home with a short  course of oral antibiotics as well as pain medication. He can have  immediate weightbearing as tolerated, range of motion as tolerated on the  right leg.   I will see him back in the office in 2 weeks for suture and staple  removal and continue to monitor his progress in the

## 2020-10-05 ENCOUNTER — TELEPHONE (OUTPATIENT)
Dept: ORTHOPEDIC SURGERY | Age: 50
End: 2020-10-05

## 2020-10-05 NOTE — TELEPHONE ENCOUNTER
Pt's wife Norm Chaidez called asking about post op symptoms. She states he is still having a lot of swelling and his range of motion is minimal. I was able to read the brief statement from Dr Didier Gerardo in his op note, but she still has questions regarding his symptoms at this time. Please f/u with pt's wife.      [phone]

## 2020-10-19 ENCOUNTER — OFFICE VISIT (OUTPATIENT)
Dept: ORTHOPEDIC SURGERY | Age: 50
End: 2020-10-19

## 2020-10-19 VITALS
OXYGEN SATURATION: 95 % | WEIGHT: 220 LBS | HEIGHT: 70 IN | HEART RATE: 88 BPM | BODY MASS INDEX: 31.5 KG/M2 | RESPIRATION RATE: 15 BRPM

## 2020-10-19 PROCEDURE — 99024 POSTOP FOLLOW-UP VISIT: CPT | Performed by: ORTHOPAEDIC SURGERY

## 2020-10-19 ASSESSMENT — ENCOUNTER SYMPTOMS
COLOR CHANGE: 0
BACK PAIN: 0
CHEST TIGHTNESS: 0

## 2020-10-19 NOTE — LETTER
[facility] [facility]  [address] [address]  Phone: [phone]  Fax: [phone]    Henriette Meckel, DO        October 19, 2020     Patient: Cameron Fritz   YOB: 1970   Date of Visit: 10/19/2020       To Whom It May Concern: It is my medical opinion that Jenifer Divers should remain out of work until 11/17/2020. If you have any questions or concerns, please don't hesitate to call.     Sincerely,        Henriette Meckel, DO

## 2020-10-19 NOTE — PROGRESS NOTES
Subjective:      Patient ID: Martinez Fitzgerald is a 48 y.o. male. Pt returns today for 2 wk post op check right knee arthroscopy DOS 10/1/20. Pt states pain today is a 0/10. He is tender along the lateral aspect of the knee. Pt states that overall he is doing well with his surgery. Pt states he is stiff going up and down the stairs and stiff in the morning. Denies any new injury or accident     He comes in today for his 2-week postop recheck after right knee arthroscopy with partial lateral meniscectomy as well as lateral ganglion cyst excision. Overall he states that the pain he was having before surgery is already gone. He does continue to have some swelling along the lateral aspect of the right knee. Patient denies any new injury to the involved extremity/ joint, denies numbness or tingling in the involved extremity and denies fever or chills. Review of Systems   Constitutional: Negative for activity change, chills and fever. Respiratory: Negative for chest tightness. Cardiovascular: Negative for chest pain. Musculoskeletal: Positive for joint swelling. Negative for arthralgias, back pain, gait problem and myalgias. Skin: Negative for color change, pallor, rash and wound. Neurological: Negative for weakness and numbness. Past Medical History:   Diagnosis Date    Cerebral artery occlusion with cerebral infarction ([address]     \"had stroke 3/30/2020- had subarachnoid hemorrhage- dont remember my symtoms - all resolved now\"    Diabetes mellitus ([address]     dx 2020    History of short term memory loss     \"this has gotten better\"    Hyperlipidemia     Personal history of other medical treatment     per pt \"my wife and daughter have malignant hyperthermia\"    Subarachnoid hemorrhage ([address] 03/2020    Wears glasses     to read       Objective:   Physical Exam  Constitutional:       Appearance: He is well-developed. HENT:      Head: Normocephalic.    Eyes:      Pupils: Pupils are equal, round, and reactive to light. Neck:      Musculoskeletal: Normal range of motion. Pulmonary:      Effort: Pulmonary effort is normal.   Musculoskeletal: Normal range of motion. General: Swelling present. No tenderness or deformity. Right hip: Normal.      Left hip: Normal.      Right knee: He exhibits swelling. He exhibits normal range of motion, no effusion, no ecchymosis, no deformity, no laceration, no erythema, normal alignment, no LCL laxity, normal patellar mobility, no bony tenderness, normal meniscus and no MCL laxity. No tenderness found. No medial joint line, no lateral joint line, no MCL, no LCL and no patellar tendon tenderness noted. Left knee: Normal. He exhibits normal range of motion, no swelling, no effusion, no ecchymosis, no deformity, no laceration, no erythema, normal alignment, no LCL laxity, normal patellar mobility, no bony tenderness and no MCL laxity. No tenderness found. No medial joint line, no lateral joint line, no MCL, no LCL and no patellar tendon tenderness noted. Skin:     General: Skin is warm and dry. Capillary Refill: Capillary refill takes less than 2 seconds. Coloration: Skin is not pale. Findings: No erythema or rash. Neurological:      Mental Status: He is alert and oriented to person, place, and time. Right knee-Incision clean, dry, intact, with no erythema, no drainage, and no signs of infection. 0-140  Mild swelling in the lateral compartment    Pathology report reviewed by me today in the office with him demonstrates ganglion cyst with no signs of malignancy. Assessment:      Right knee arthroscopy with lateral meniscectomy, 2 weeks  Right knee lateral ganglion cyst excision, 2 weeks      Plan:       I discussed with him today that he is progressing extremely well. I discussed with the patient today that their are symptoms are normal and should improve with time. Continue weight-bearing as tolerated.   Continue range of motion exercises as instructed. Ice and elevate as needed. Tylenol or Motrin for pain. Follow up in 1 month for recheck. I will keep him off of work for the next month and will discuss return to work at his next visit.               Sebastian Parisi, DO

## 2020-10-19 NOTE — PATIENT INSTRUCTIONS
Continue weight-bearing as tolerated. Continue range of motion exercises as instructed. Ice and elevate as needed. Tylenol or Motrin for pain.   Sutures and staples removed today  Follow up in 4 weeks  contuine off work for 4 weeks  Keep incision dry and clean at all times

## 2020-10-19 NOTE — PROGRESS NOTES
Pt returns today for 2 wk post op check right knee arthroscopy DOS 10/1/20. Pt states pain today is a 0/10. He is tender along the lateral aspect of the knee. Pt states that overall he is doing well with his surgery. Pt states he is stiff going up and down the stairs and stiff in the morning.  Denies any new injury or accident

## 2020-11-16 ENCOUNTER — OFFICE VISIT (OUTPATIENT)
Dept: ORTHOPEDIC SURGERY | Age: 50
End: 2020-11-16

## 2020-11-16 VITALS
HEIGHT: 70 IN | HEART RATE: 83 BPM | WEIGHT: 220 LBS | RESPIRATION RATE: 15 BRPM | BODY MASS INDEX: 31.5 KG/M2 | OXYGEN SATURATION: 97 %

## 2020-11-16 PROCEDURE — 99024 POSTOP FOLLOW-UP VISIT: CPT | Performed by: ORTHOPAEDIC SURGERY

## 2020-11-16 NOTE — PATIENT INSTRUCTIONS
Continue weight-bearing as tolerated. Continue range of motion exercises as instructed. Ice and elevate as needed. Tylenol or Motrin for pain.   Follow up as needed  May return back to work on 11/23/20

## 2020-11-16 NOTE — LETTER
[facility] [facility]  [address]  Phone: [phone]  Fax: [phone]    Joy Keen DO        November 16, 2020     Patient: Roselia Chacko   YOB: 1970   Date of Visit: 11/16/2020       To Whom It May Concern: It is my medical opinion that Jose Maddox may return to work on 11/23/20 with no restrictions . If you have any questions or concerns, please don't hesitate to call.     Sincerely,        Joy Keen DO

## 2020-11-16 NOTE — PROGRESS NOTES
Pt returns to the office today for post op check of the right knee arthroscopy DOS 10/1/20. Pt states that pain today is a 0/10. Pt states overall he is doing well with his healing process. Pt states he did have a little flare up about a week ago in the right knee he twisted his knee wrong he believes. After about two days the pain went away.

## 2020-11-17 ASSESSMENT — ENCOUNTER SYMPTOMS
CHEST TIGHTNESS: 0
BACK PAIN: 0
COLOR CHANGE: 0

## 2020-11-17 NOTE — PROGRESS NOTES
Subjective:      Patient ID: Clay Garcia is a 48 y.o. male. Pt returns to the office today for post op check of the right knee arthroscopy DOS 10/1/20. Pt states that pain today is a 0/10. Pt states overall he is doing well with his healing process. Pt states he did have a little flare up about a week ago in the right knee he twisted his knee wrong he believes. After about two days the pain went away. He comes in today for his 6-week postop recheck after right knee arthroscopy with partial lateral meniscectomy as well as lateral ganglion cyst excision. He states that he is feeling great and is having no pain. He does continue to have some swelling along the lateral aspect of the right knee. Patient denies any new injury to the involved extremity/ joint, denies numbness or tingling in the involved extremity and denies fever or chills. Review of Systems   Constitutional: Negative for activity change, chills and fever. Respiratory: Negative for chest tightness. Cardiovascular: Negative for chest pain. Musculoskeletal: Positive for joint swelling. Negative for arthralgias, back pain, gait problem and myalgias. Skin: Negative for color change, pallor, rash and wound. Neurological: Negative for weakness and numbness. Past Medical History:   Diagnosis Date    Cerebral artery occlusion with cerebral infarction ([address]     \"had stroke 3/30/2020- had subarachnoid hemorrhage- dont remember my symtoms - all resolved now\"    Diabetes mellitus ([address]     dx 2020    History of short term memory loss     \"this has gotten better\"    Hyperlipidemia     Personal history of other medical treatment     per pt \"my wife and daughter have malignant hyperthermia\"    Subarachnoid hemorrhage ([address] 03/2020    Wears glasses     to read       Objective:   Physical Exam  Constitutional:       Appearance: He is well-developed. HENT:      Head: Normocephalic.    Eyes:      Pupils: Pupils are equal, as needed. I will allow him to return to work on Monday with no restrictions.               Sebastian Parisi, DO

## 2021-08-23 ENCOUNTER — OFFICE VISIT (OUTPATIENT)
Dept: ORTHOPEDIC SURGERY | Age: 51
End: 2021-08-23
Payer: COMMERCIAL

## 2021-08-23 VITALS
BODY MASS INDEX: 31.5 KG/M2 | HEART RATE: 78 BPM | WEIGHT: 220 LBS | RESPIRATION RATE: 16 BRPM | HEIGHT: 70 IN | OXYGEN SATURATION: 98 %

## 2021-08-23 DIAGNOSIS — M17.11 ARTHRITIS OF RIGHT KNEE: ICD-10-CM

## 2021-08-23 PROCEDURE — G8427 DOCREV CUR MEDS BY ELIG CLIN: HCPCS | Performed by: PHYSICIAN ASSISTANT

## 2021-08-23 PROCEDURE — 99213 OFFICE O/P EST LOW 20 MIN: CPT | Performed by: PHYSICIAN ASSISTANT

## 2021-08-23 PROCEDURE — 1036F TOBACCO NON-USER: CPT | Performed by: PHYSICIAN ASSISTANT

## 2021-08-23 PROCEDURE — 20610 DRAIN/INJ JOINT/BURSA W/O US: CPT | Performed by: PHYSICIAN ASSISTANT

## 2021-08-23 PROCEDURE — 3017F COLORECTAL CA SCREEN DOC REV: CPT | Performed by: PHYSICIAN ASSISTANT

## 2021-08-23 PROCEDURE — G8417 CALC BMI ABV UP PARAM F/U: HCPCS | Performed by: PHYSICIAN ASSISTANT

## 2021-08-23 NOTE — PATIENT INSTRUCTIONS
Continue to weight bear as tolerated  Continue range of motion  Ice and elevate as needed  Tylenol or Motrin for pain  Injection given today in the office   Rest for 24-48 hours  Follow up as needed    Call the office within the the next month or two if you are still having pain on the inner part of the knee. We can order an MRI for the right knee for a medial mensicus tear.

## 2021-08-23 NOTE — PROGRESS NOTES
HPI:  To Cifuentes is a 46 y.o. male that presents the office today with complaints of right knee pain along the medial aspect of his right knee it has been having for the last few weeks to a month or 2. Previously he has been seen in this office for his right knee and he did have a right knee arthroscopy because he had a lateral meniscus tear that was taking care of. He states that the lateral aspect of his knee feels fine but recently he noticed the medial side of his knee started hurting and he felt like a ripping sensation in the medial side of his knee. No particular injury. He rates his knee pain at a 5/10. He has been taking ibuprofen but he has a history of a subarachnoid hemorrhage so he is not supposed to be taking that much ibuprofen.     Past Medical History:   Diagnosis Date    Cerebral artery occlusion with cerebral infarction ([address]     \"had stroke 3/30/2020- had subarachnoid hemorrhage- dont remember my symtoms - all resolved now\"    Diabetes mellitus ([address]     dx 2020    History of short term memory loss     \"this has gotten better\"    Hyperlipidemia     Personal history of other medical treatment     per pt \"my wife and daughter have malignant hyperthermia\"    Subarachnoid hemorrhage ([address] 03/2020    Wears glasses     to read       Past Surgical History:   Procedure Laterality Date    KNEE ARTHROSCOPY Right 10/1/2020    RIGHT KNEE ARTHROSCOPY , LATERAL MENISCECTOMY , LATERAL OPEN , CYST EXCISION performed by Hakan Barros DO at [address]  1970's    OTHER SURGICAL HISTORY  03/2020    per old chart at [address] [place] [address] had diagnostic angiogram for the [facility]- 3/2020, 4/2020 and 7/2020   [place]    VASECTOMY  2010       Family History   Problem Relation Age of Onset    Cancer Mother         non hodgkins lymphoma    Stroke Father        Social History     Socioeconomic History    Marital status:      Spouse name: None    Number of children: None    Years of education: None    Highest education level: None   Occupational History    None   Tobacco Use    Smoking status: Former Smoker     Packs/day: 1.00     Years: 25.00     Pack years: 25.00     Types: Cigarettes     Quit date: 3/30/2020     Years since quittin.4    Smokeless tobacco: Never Used   Vaping Use    Vaping Use: Never used   Substance and Sexual Activity    Alcohol use: No    Drug use: No    Sexual activity: None   Other Topics Concern    None   Social History Narrative    None     Social Determinants of Health     Financial Resource Strain:     Difficulty of Paying Living Expenses:    Food Insecurity:     Worried About Running Out of Food in the Last Year:     Ran Out of Food in the Last Year:    Transportation Needs:     Lack of Transportation (Medical):      Lack of Transportation (Non-Medical):    Physical Activity:     Days of Exercise per Week:     Minutes of Exercise per Session:    Stress:     Feeling of Stress :    Social Connections:     Frequency of Communication with Friends and Family:     Frequency of Social Gatherings with Friends and Family:     Attends Adventism Services:     Active Member of Clubs or Organizations:     Attends Club or Organization Meetings:     Marital Status:    Intimate Partner Violence:     Fear of Current or Ex-Partner:     Emotionally Abused:     Physically Abused:     Sexually Abused:        Current Outpatient Medications   Medication Sig Dispense Refill    fenofibrate (TRICOR) 48 MG tablet Take 144 mg by mouth daily      buPROPion (WELLBUTRIN XL) 150 MG extended release tablet Take 150 mg by mouth every morning       GLYXAMBI 10-5 MG TABS TAKE 1 TABLET BY ORAL ROUTE ONCE DAILY IN THE MORNING FOR 30 DAYS      FREESTYLE LITE strip TEST TWICE A DAY      FreeStyle Lancets MISC TEST TWICE A DAY      metFORMIN (GLUCOPHAGE-XR) 500 MG extended release tablet TAKE 1 TABLET BY MOUTH EVERY DAY WITH EVENING MEAL      atorvastatin (LIPITOR) 80 MG tablet TAKE 1 TABLET BY MOUTH EVERY DAY       No current facility-administered medications for this visit. No Known Allergies    Review of Systems:  See above      Physical Exam:   Pulse 78   Resp 16   Ht 5' 10\" (1.778 m)   Wt 220 lb (99.8 kg)   SpO2 98%   BMI 31.57 kg/m²        Gait is Antalgic. The patient can bear weight on the injured extremity. Gen/Psych:Examination reveals a pleasant individual in no acute distress. The patient is oriented to time, place and person. The patient's mood and affect are appropriate. Patient appears well nourished. Body habitus is overweight     Lymph:  no lymphedema in bilateral lower extremities     Skin intact in bilateral lower extremities with no ulcerations, lesions, rash, erythema. Vascular: There are no varicosities in bilateral lower extremities, sensation intact to light touch over bilateral lower extremities. right leg/knee exam:  Leg alignment:     neutral  Quadriceps/hamstring atrophy:   no  Knee effusion:    no   Knee erythema:   no  ROM:     Full, 0-120 degrees  Varus laxity at 0 and 30 deg's: no  Valguslaxity at 0 and 30 deg's: no  Recurvatum:    no  Tenderness at:   Medial joint line    Bilateral Knee strength is 5/5 flexion and extension  There is + L2-S1 motor and sensory function in bilateral lower extremities    Outside record review: office notes and operative notes    Imaging studies:  4 views of the right knee taken reviewed in the office today show mild lateral compartment joint space narrowing no significant medial compartment joint space narrowing, patellofemoral compartment well-maintained, no acute fractures or dislocations. The official read and interpretation of these x-rays will be done by the the [facility]       Impression:    Diagnosis Orders   1.  Arthritis of right knee  WI ARTHROCENTESIS ASPIR&/INJ MAJOR JT/BURSA W/O US           Plan:    I explained to the patient that it is possible he has a medial meniscus tear at this point. He states that he did not want to have another surgery and if he could put it off then he would like to do that. I therefore said we could try a steroid injection to see if we get him some relief and if that worked then he could avoid surgery. If his pain does not get better or it comes back rather quickly we would get an MRI of the knee to look for medial meniscus tear. Patient Instructions   Continue to weight bear as tolerated  Continue range of motion  Ice and elevate as needed  Tylenol or Motrin for pain  Injection given today in the office   Rest for 24-48 hours  Follow up as needed    Call the office within the the next month or two if you are still having pain on the inner part of the knee. We can order an MRI for the right knee for a medial mensicus tear. Right knee injection procedure note    Pre-procedure diagnosis:  Right knee DJD    Post-procedure diagnosis:  same    Procedure: The planned procedure/risks/benefits/alternatives were discussed with the patient. Risks include, but are not limited to, increased pain, drug reaction, infection, bleeding, lack of improvement, neurovascular injury, and increased blood sugar levels. The patient understood and all of their questions were answered. The right knee was prepped with alcohol, then a 22 gauge needle was advanced into the inferior-medial joint without difficulty. The joint was then injected with 1 ml 1% lidocaine, 1ml of Kenalog (40 mg/ml), 1ml of 0.5% bupivacaine. The injection site was cleansed with isopropyl alcohol and a band-aid was placed. Complications:  None, the patient tolerated the procedure well. Instructions: The patient was advised to rest the knee and decrease activity for the next 24 to 48 hours. May use prescription or OTC pain relievers as needed for any post-injection pain as well as ice.

## 2022-08-04 ENCOUNTER — OFFICE VISIT (OUTPATIENT)
Dept: ORTHOPEDIC SURGERY | Age: 52
End: 2022-08-04
Payer: COMMERCIAL

## 2022-08-04 VITALS
WEIGHT: 208.8 LBS | DIASTOLIC BLOOD PRESSURE: 81 MMHG | SYSTOLIC BLOOD PRESSURE: 134 MMHG | BODY MASS INDEX: 29.96 KG/M2 | OXYGEN SATURATION: 94 % | HEART RATE: 80 BPM

## 2022-08-04 DIAGNOSIS — M25.461 EFFUSION OF KNEE JOINT RIGHT: Primary | ICD-10-CM

## 2022-08-04 PROCEDURE — 99213 OFFICE O/P EST LOW 20 MIN: CPT | Performed by: PHYSICIAN ASSISTANT

## 2022-08-04 NOTE — PROGRESS NOTES
HPI:  Renetta Heard is a 46 y.o. male that presents back to the office today with recurrent right knee pain. I last saw him about a year ago for his right knee at which time he was having medial knee pain and at that time he was given a steroid injection in the right knee. He said that the injection helped for about a month but the pain came back and now he continues to have pain within the medial side of the knee. He would like to proceed with an MRI to see if there is a medial meniscus tear.   Past Medical History:   Diagnosis Date    Cerebral artery occlusion with cerebral infarction ([address]     \"had stroke 3/30/2020- had subarachnoid hemorrhage- dont remember my symtoms - all resolved now\"    Diabetes mellitus ([address]     dx     History of short term memory loss     \"this has gotten better\"    Hyperlipidemia     Personal history of other medical treatment     per pt \"my wife and daughter have malignant hyperthermia\"    [facility]arachnoid hemorrhage ([facility] 2020    Wears glasses     to read       Past Surgical History:   Procedure Laterality Date    KNEE ARTHROSCOPY Right 10/1/2020    RIGHT KNEE ARTHROSCOPY , LATERAL MENISCECTOMY , LATERAL OPEN , CYST EXCISION performed by Dara Lawrecne DO at [address] [place] [address]  's    OTHER SURGICAL HISTORY  2020    per old chart at [facility] had diagnostic angiogram for the [facility]- 3/2020, 2020 and 2020    TONSILLECTOMY  1978    VASECTOMY  2010       Family History   Problem Relation Age of Onset    Cancer Mother         non hodgkins lymphoma    Stroke Father        Social History     Socioeconomic History    Marital status:    Tobacco Use    Smoking status: Former     Packs/day: 1.00     Years: 25.00     Pack years: 25.00     Types: Cigarettes     Quit date: 3/30/2020     Years since quittin.3    Smokeless tobacco: Never   Vaping Use    Vaping Use: Never used   Substance and Sexual Activity    Alcohol use: No    Drug use: sensory function in bilateral lower extremities    Outside record review: office notes and operative notes    Imaging studies:  X-rays were reviewed which showed no significant degenerative change within the right knee, no acute fractures or dislocations. Impression:    Diagnosis Orders   1. Effusion of knee joint right  MRI KNEE RIGHT WO CONTRAST    Possible medial meniscus tear              Plan:    I did explain to him that his MRI may just show arthritis and if that is the case then we will not recommend arthroscopy. Patient Instructions   MRI of right knee ordered today, call office once MRI complete and I will go over the results with you on the phone. If there is a tear of the medial meniscus then I will get you back in with the surgeon to discuss arthroscopy of the knee. If there is no tear and arthritis is seen within the knee joint then we can talk about doing another injection and possibly trying viscosupplementation (gel injection).

## 2022-08-04 NOTE — PATIENT INSTRUCTIONS
MRI of right knee ordered today, call office once MRI complete and I will go over the results with you on the phone. If there is a tear of the medial meniscus then I will get you back in with the surgeon to discuss arthroscopy of the knee. If there is no tear and arthritis is seen within the knee joint then we can talk about doing another injection and possibly trying viscosupplementation (gel injection).

## 2022-08-10 ENCOUNTER — HOSPITAL ENCOUNTER (OUTPATIENT)
Dept: MRI IMAGING | Age: 52
Discharge: HOME | End: 2022-08-10
Payer: COMMERCIAL

## 2022-08-10 DIAGNOSIS — M25.461 EFFUSION OF KNEE JOINT RIGHT: ICD-10-CM

## 2022-08-10 PROCEDURE — 73721 MRI JNT OF LWR EXTRE W/O DYE: CPT

## 2022-08-17 ENCOUNTER — TELEPHONE (OUTPATIENT)
Dept: ORTHOPEDIC SURGERY | Age: 52
End: 2022-08-17

## 2022-08-18 ENCOUNTER — OFFICE VISIT (OUTPATIENT)
Dept: ORTHOPEDIC SURGERY | Age: 52
End: 2022-08-18
Payer: COMMERCIAL

## 2022-08-18 ENCOUNTER — TELEPHONE (OUTPATIENT)
Dept: ORTHOPEDIC SURGERY | Age: 52
End: 2022-08-18

## 2022-08-18 VITALS
DIASTOLIC BLOOD PRESSURE: 86 MMHG | SYSTOLIC BLOOD PRESSURE: 116 MMHG | HEART RATE: 76 BPM | OXYGEN SATURATION: 97 % | BODY MASS INDEX: 28.35 KG/M2 | RESPIRATION RATE: 16 BRPM | WEIGHT: 198 LBS | HEIGHT: 70 IN

## 2022-08-18 DIAGNOSIS — M17.11 ARTHRITIS OF KNEE, RIGHT: ICD-10-CM

## 2022-08-18 DIAGNOSIS — S83.271A COMPLEX TEAR OF LATERAL MENISCUS OF RIGHT KNEE, UNSPECIFIED WHETHER OLD OR CURRENT TEAR, INITIAL ENCOUNTER: Primary | ICD-10-CM

## 2022-08-18 PROCEDURE — 99213 OFFICE O/P EST LOW 20 MIN: CPT | Performed by: PHYSICIAN ASSISTANT

## 2022-08-18 RX ORDER — BUPROPION HYDROCHLORIDE 300 MG/1
300 TABLET ORAL EVERY MORNING
COMMUNITY

## 2022-08-18 NOTE — PATIENT INSTRUCTIONS
Off of work for the next 2 weeks  Continue to weight bear as tolerated  Continue range of motion  Ice and elevate as needed  Follow up with Javon Shea will speak with Dr. Vessie Brittle about your MRI

## 2022-08-18 NOTE — TELEPHONE ENCOUNTER
You did an arthroscopy of his right knee about a year ago and he had some pain in the knee at 1 point which I treated with a steroid injection but when he continued to have pain I went ahead and repeated an MRI and it shows more of a picture of arthritis than anything else with likely a recurrent tear of the lateral meniscus. Do think this is something that is now more of a knee replacement type of issue or would you scope the knee 1 more time just to try to buy time for him.

## 2022-08-18 NOTE — PROGRESS NOTES
HPI:  Vesta Roe is a 46 y.o. male that returns today to go over MRI of his right knee. Previously he had a right knee scope about 2 years ago most for a lateral meniscus tear. At that time he was noted to have minimal degenerative change in the right knee. He was seen subsequently after his surgery and postop follow-up ended fairly well but started having pain again about 8 months after the surgery and he was given a steroid injection in the knee. 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 He does not have any particular injury but he does complain of pain especially after the Tylenol wears off that he takes. He has swelling in the knee and he is unable to fully straighten or bend the knee.     Past Medical History:   Diagnosis Date    Cerebral artery occlusion with cerebral infarction ([address]     \"had stroke 3/30/2020- had subarachnoid hemorrhage- dont remember my symtoms - all resolved now\"    Diabetes mellitus ([address]     dx 2020    History of short term memory loss     \"this has gotten better\"    Hyperlipidemia     Personal history of other medical treatment     per pt \"my wife and daughter have malignant hyperthermia\"    Subarachnoid hemorrhage ([address] 03/2020    Wears glasses     to read       Past Surgical History:   Procedure Laterality Date    KNEE ARTHROSCOPY Right 10/1/2020    RIGHT KNEE ARTHROSCOPY , LATERAL MENISCECTOMY , LATERAL OPEN , CYST EXCISION performed by Fermin Irving DO at [address]  1970's    OTHER SURGICAL HISTORY  03/2020    per old chart at [facility] had diagnostic angiogram for the [facility]- 3/2020, 4/2020 and 7/2020    TONSILLECTOMY  1978    VASECTOMY  2010       Family History   Problem Relation Age of Onset    Cancer Mother         non hodgkins lymphoma    Stroke Father        Social History     Socioeconomic History    Marital status:  Spouse name: None    Number of children: None    Years of education: None    Highest education level: None   Tobacco Use    Smoking status: Former     Packs/day: 1.00     Years: 25.00     Pack years: 25.00     Types: Cigarettes     Quit date: 3/30/2020     Years since quittin.3    Smokeless tobacco: Never   Vaping Use    Vaping Use: Never used   Substance and Sexual Activity    Alcohol use: No    Drug use: No       Current Outpatient Medications   Medication Sig Dispense Refill    buPROPion (WELLBUTRIN XL) 300 MG extended release tablet Take 300 mg by mouth every morning      fenofibrate (TRICOR) 48 MG tablet Take 144 mg by mouth daily      GLYXAMBI 10-5 MG TABS TAKE 1 TABLET BY ORAL ROUTE ONCE DAILY IN THE MORNING FOR 30 DAYS      FREESTYLE LITE strip TEST TWICE A DAY      FreeStyle Lancets MISC TEST TWICE A DAY      metFORMIN (GLUCOPHAGE-XR) 500 MG extended release tablet TAKE 1 TABLET BY MOUTH EVERY DAY WITH EVENING MEAL      atorvastatin (LIPITOR) 80 MG tablet TAKE 1 TABLET BY MOUTH EVERY DAY      buPROPion (WELLBUTRIN XL) 150 MG extended release tablet Take 150 mg by mouth every morning  (Patient not taking: Reported on 2022)       No current facility-administered medications for this visit. No Known Allergies    Review of Systems:  See above      Physical Exam:   /86 (Site: Left Upper Arm, Position: Sitting, Cuff Size: Medium Adult)   Pulse 76   Resp 16   Ht 5' 10\" (1.778 m)   Wt 198 lb (89.8 kg)   SpO2 97%   BMI 28.41 kg/m²        Gait is Antalgic. The patient can bear weight on the injured extremity. Gen/Psych:Examination reveals a pleasant individual in no acute distress. The patient is oriented to time, place and person. The patient's mood and affect are appropriate. Patient appears well nourished.  Body habitus is overweight     Lymph:  no lymphedema in bilateral lower extremities     Skin intact in bilateral lower extremities with no ulcerations, lesions, rash, erythema. Vascular: There are no varicosities in bilateral lower extremities, sensation intact to light touch over bilateral lower extremities. right leg/knee exam:  Leg alignment:     neutral  Quadriceps/hamstring atrophy:   no  Knee effusion:    no   Knee erythema:   no  ROM:     3-110 degrees  Varus laxity at 0 and 30 deg's: no  Valguslaxity at 0 and 30 deg's: no  Recurvatum:    no  Tenderness at: Anterior    Bilateral Knee strength is 5/5 flexion and extension  There is + L2-S1 motor and sensory function in bilateral lower extremities    Outside record review: office notes and operative notes    Imaging studies:  4 views of the right knee taken reviewed in the office today show mild lateral compartment joint space narrowing no significant medial compartment joint space narrowing, patellofemoral compartment well-maintained, no acute fractures or dislocations. The official read and interpretation of these x-rays will be done by the the [facility]       Impression:    Diagnosis Orders   1. Complex tear of lateral meniscus of right knee, unspecified whether old or current tear, initial encounter        2. Arthritis of knee, right                  Plan:    I will keep him off work for now and have him follow-up with Dr. Mayo Serna to discuss options. I did explain to him that his knee looks far worse on MRI today and then x-rays would lead us to believe and its possible he has already advanced to the point that he needs a knee replacement versus arthroscopy. I also did discuss potential viscosupplementation injections if Dr. Mayo Serna feels that the knee is too far gone to do an arthroscopy. I will discuss this case with Dr. Mayo Serna and we will get back to the patient by Monday to decide how to proceed whether arthroscopy, viscosupplementation, or total knee replacement.

## 2022-08-22 ENCOUNTER — TELEPHONE (OUTPATIENT)
Dept: ORTHOPEDIC SURGERY | Age: 52
End: 2022-08-22

## 2022-08-24 ENCOUNTER — OFFICE VISIT (OUTPATIENT)
Dept: ORTHOPEDIC SURGERY | Age: 52
End: 2022-08-24
Payer: COMMERCIAL

## 2022-08-24 VITALS
OXYGEN SATURATION: 95 % | DIASTOLIC BLOOD PRESSURE: 90 MMHG | BODY MASS INDEX: 28.41 KG/M2 | HEIGHT: 70 IN | HEART RATE: 83 BPM | SYSTOLIC BLOOD PRESSURE: 127 MMHG

## 2022-08-24 DIAGNOSIS — M17.11 PRIMARY OSTEOARTHRITIS OF RIGHT KNEE: Primary | ICD-10-CM

## 2022-08-24 PROCEDURE — 99214 OFFICE O/P EST MOD 30 MIN: CPT | Performed by: ORTHOPAEDIC SURGERY

## 2022-08-24 ASSESSMENT — ENCOUNTER SYMPTOMS
COLOR CHANGE: 0
EYE REDNESS: 0
ABDOMINAL PAIN: 0
CHEST TIGHTNESS: 0
BACK PAIN: 0

## 2022-08-24 NOTE — PROGRESS NOTES
Subjective:      Patient ID: Roxie Ye is a 46 y.o. male. Patient seen in office today for FU R knee MRI results 8/10/22. Stated 6/10 pain level today. No new complaints/concerns. X-ray taken today. Impression  Severe lateral compartment cartilage loss and multifocal subchondral edema,  significantly progressed since the previous exam.     Suspected complex tear or re-tear of the lateral meniscus with lateral  meniscal body extrusion. Large joint effusion. Mild synovitis. He comes in today for evaluation of his right knee pain. He states that since his right knee scope and ganglion cyst excision about 2 years ago he was doing well but over the past year or so he has been dealing with progressively worsening pain in the right knee again. He states that now the more activity as he is having worsening deep, aching and throbbing pain as well as swelling globally in his right knee primarily along the anterior lateral aspect. Patient denies any new injury to the involved extremity/ joint, denies numbness or tingling in the involved extremity and denies fever or chills. Review of Systems   Constitutional:  Negative for activity change, chills and fever. HENT:  Negative for congestion and drooling. Eyes:  Negative for redness. Respiratory:  Negative for chest tightness. Cardiovascular:  Negative for chest pain. Gastrointestinal:  Negative for abdominal pain. Endocrine: Negative for cold intolerance and heat intolerance. Musculoskeletal:  Positive for arthralgias, gait problem, joint swelling and myalgias. Negative for back pain. Skin:  Negative for color change, pallor, rash and wound. Neurological:  Negative for weakness and numbness. Psychiatric/Behavioral:  Negative for confusion.       Past Medical History:   Diagnosis Date    Cerebral artery occlusion with cerebral infarction ([facility]     \"had stroke 3/30/2020- had subarachnoid hemorrhage- dont remember my symtoms - all resolved now\"    Diabetes mellitus ([facility]     dx 2020    History of short term memory loss     \"this has gotten better\"    Hyperlipidemia     Personal history of other medical treatment     per pt \"my wife and daughter have malignant hyperthermia\"    Subarachnoid hemorrhage ([facility] 03/2020    Wears glasses     to read       Objective:   Physical Exam  Constitutional:       Appearance: He is well-developed. HENT:      Head: Normocephalic. Nose: No congestion. Eyes:      Pupils: Pupils are equal, round, and reactive to light. Pulmonary:      Effort: Pulmonary effort is normal.   Musculoskeletal:         General: Swelling and tenderness present. No deformity. Normal range of motion. Cervical back: Normal range of motion. Right hip: Normal.      Left hip: Normal.      Right knee: Swelling, bony tenderness and crepitus present. No deformity, effusion, erythema, ecchymosis or lacerations. Normal range of motion. Tenderness present over the lateral joint line and patellar tendon. No medial joint line, MCL or LCL tenderness. No LCL laxity or MCL laxity. Normal alignment and normal patellar mobility. Left knee: Normal. No swelling, deformity, effusion, erythema, ecchymosis, lacerations or bony tenderness. Normal range of motion. No tenderness. No medial joint line, lateral joint line, MCL, LCL or patellar tendon tenderness. No LCL laxity or MCL laxity. Normal alignment and normal patellar mobility. Skin:     General: Skin is warm and dry. Capillary Refill: Capillary refill takes less than 2 seconds. Coloration: Skin is not pale. Findings: No erythema or rash. Neurological:      Mental Status: He is alert and oriented to person, place, and time. Sensory: No sensory deficit. Motor: No weakness. Right knee-well-healed surgical scars, no redness, no signs of infection.   Moderate knee effusion  Mild valgus deformity with solid endpoint of the MCL.  0-140    XR KNEE RIGHT (3 VIEWS)    Result Date: 8/24/2022  Chana Holiday X-ray 3 views of the right knee obtained and reviewed by me today in the office demonstrates age appropriate bone density throughout with moderate to severe degenerative change of the right knee which has worsened compared to prior x-rays from August 2021 with approximately 75% lateral patellofemoral joint space narrowing, 50% medial joint space narrowing, early osteophyte formation in the lateral patellofemoral compartments with normal tracking the patella, no acute osseous abnormalities. Impression: Moderate to severe degenerative change of the right knee as above with no acute process. Assessment:      Right knee OA, moderate to severe  Right knee lateral meniscus tear      Plan:      I discussed with him today his x-ray and MRI findings. I explained to him that he does have a new tear in his lateral meniscus of his right knee and the arthritis in the right knee has progressively worsened. I explained to him that we could either proceed with right knee arthroscopy which may or may not help with his symptoms or could consider knee replacement surgery. I did discuss with him today performing right knee arthroscopy with partial lateral meniscectomy and chondroplasty. I explained to him that if this does not help we could then proceed with Orthovisc injections. I also had a lengthy discussion with him today in regards to right total knee replacement surgery. I explained risks, benefits, possible complications of the procedure and answered all questions for the patient. I explained postoperative rehabilitation protocol and expectations with the patient today. The patient understands and consents to the procedure. Patient will follow up with their primary care physician prior to surgical treatment for preoperative clearance. We will schedule surgery at soonest convenience. Continue weight-bearing as tolerated.   Continue range of motion exercises as

## 2022-08-24 NOTE — PATIENT INSTRUCTIONS
We will schedule surgery at soonest convenience.  If you have any questions regarding your surgery please call our office and ask to speak with Summer Moreno [phone]

## 2022-08-24 NOTE — PROGRESS NOTES
Patient seen in office today for FU R knee MRI results 8/10/22. Stated 6/10 pain level today. No new complaints/concerns. X-ray taken today. Impression   Severe lateral compartment cartilage loss and multifocal subchondral edema,   significantly progressed since the previous exam.       Suspected complex tear or re-tear of the lateral meniscus with lateral   meniscal body extrusion. Large joint effusion. Mild synovitis.

## 2022-08-24 NOTE — LETTER
[facility] [place] [facility]  [address] & [address] [facility] [address]  Phone: [phone]    Sebastian Parisi, DO        August 24, 2022     Patient: Louis Jones   YOB: 1970   Date of Visit: 8/24/2022       To Whom It May Concern: It is my medical opinion that [employer] . If you have any questions or concerns, please don't hesitate to call.     Sincerely,        Sebastian Parisi, DO

## 2022-09-08 DIAGNOSIS — G89.29 CHRONIC PAIN OF RIGHT KNEE: ICD-10-CM

## 2022-09-08 DIAGNOSIS — M25.561 CHRONIC PAIN OF RIGHT KNEE: ICD-10-CM

## 2022-09-08 DIAGNOSIS — M17.11 PRIMARY OSTEOARTHRITIS OF RIGHT KNEE: Primary | ICD-10-CM

## 2022-09-13 ENCOUNTER — TELEPHONE (OUTPATIENT)
Dept: ORTHOPEDIC SURGERY | Age: 52
End: 2022-09-13

## 2022-09-20 ENCOUNTER — HOSPITAL ENCOUNTER (OUTPATIENT)
Dept: PHYSICAL THERAPY | Age: 52
Setting detail: THERAPIES SERIES
Discharge: HOME | End: 2022-09-20
Payer: COMMERCIAL

## 2022-09-20 PROCEDURE — 97110 THERAPEUTIC EXERCISES: CPT

## 2022-09-20 PROCEDURE — 97161 PT EVAL LOW COMPLEX 20 MIN: CPT

## 2022-09-20 ASSESSMENT — PAIN DESCRIPTION - ORIENTATION: ORIENTATION: RIGHT

## 2022-09-20 ASSESSMENT — PAIN SCALES - GENERAL: PAINLEVEL_OUTOF10: 7

## 2022-09-20 ASSESSMENT — PAIN DESCRIPTION - FREQUENCY: FREQUENCY: CONTINUOUS

## 2022-09-20 ASSESSMENT — PAIN DESCRIPTION - DESCRIPTORS: DESCRIPTORS: SHARP;DULL

## 2022-09-20 ASSESSMENT — PAIN DESCRIPTION - LOCATION: LOCATION: KNEE

## 2022-09-20 NOTE — PROGRESS NOTES
Physical Therapy: Initial Evaluation    Patient: Martine oPwer (89 y.o. male)   Examination Date:   Plan of Care Certification Period: 2022 to        :  1970 ;    Confirmed: Yes MRN: [id number]  CSN: [id number]   Insurance: Payor: Mike Vick / Plan: Meet Crocker / Product Type: *No Product type* /   Insurance ID: [id number] - ([facility]) Secondary Insurance (if applicable):    Referring Physician: DO Tiffanie Marks DO   PCP: Michelle Crawley MD Visits to Date/Visits Approved:   /      No Show/Cancelled Appts:   /       Medical Diagnosis: Primary osteoarthritis of right knee [M17.11]  Chronic pain of right knee [M25.561, G89.29] Right TKA 10/4/22  Treatment Diagnosis: Right knee pain     PERTINENT MEDICAL HISTORY   Patient Assessed for Rehabilitation Services: Yes       Medical History: Chart Reviewed: Yes   Past Medical History:   Diagnosis Date    Cerebral artery occlusion with cerebral infarction ([address]     \"had stroke 3/30/2020- had subarachnoid hemorrhage- dont remember my symtoms - all resolved now\"    Diabetes mellitus ([address]     2020    History of short term memory loss     \"this has gotten better\"    Hyperlipidemia     Personal history of other medical treatment     per pt \"my wife and daughter have malignant hyperthermia\"    Subarachnoid hemorrhage ([address] 2020    Wears glasses     to read     Surgical History:   Past Surgical History:   Procedure Laterality Date    KNEE ARTHROSCOPY Right 10/1/2020    RIGHT KNEE ARTHROSCOPY , LATERAL MENISCECTOMY , LATERAL OPEN , CYST EXCISION performed by Tiffanie Isaacs DO at [facility]      OTHER SURGICAL HISTORY  2020    per old chart at [facility] had diagnostic angiogram for the [facility]- 3/2020, 2020 and 2020    [facility]         Medications:   Current Outpatient Medications:     buPROPion (WELLBUTRIN XL) 300 MG extended release tablet, Take 300 mg by mouth every morning, Disp: , Rfl:     fenofibrate (TRICOR) 48 MG tablet, Take 144 mg by mouth daily, Disp: , Rfl:     buPROPion (WELLBUTRIN XL) 150 MG extended release tablet, Take 150 mg by mouth every morning, Disp: , Rfl:     GLYXAMBI 10-5 MG TABS, TAKE 1 TABLET BY ORAL ROUTE ONCE DAILY IN THE MORNING FOR 30 DAYS, Disp: , Rfl:     FREESTYLE LITE strip, TEST TWICE A DAY, Disp: , Rfl:     FreeStyle Lancets MISC, TEST TWICE A DAY, Disp: , Rfl:     metFORMIN (GLUCOPHAGE-XR) 500 MG extended release tablet, TAKE 1 TABLET BY MOUTH EVERY DAY WITH EVENING MEAL, Disp: , Rfl:     atorvastatin (LIPITOR) 80 MG tablet, TAKE 1 TABLET BY MOUTH EVERY DAY, Disp: , Rfl:   Allergies: Patient has no known allergies. SUBJECTIVE EXAMINATION     History obtained from[de-identified] Patient, Chart Review,      Family/Caregiver Present: No    Subjective History: Onset Date:  (10/4/2022)   The patient has been having right knee pain since 2014. At this time the patient received a cortisone injections with good benefit. The patient's pain returned in 2020 and again had a cortisone injection with minimal benefits. The patient had a menisectomy in 2020 with some improved knee pain. The patient is scheduled to have a right TKA on 10/4/2022 by Dr. Rhonda Barrow. The patient works as a  and completes about 160 stops per day. Patient has a history of CVA with only minimal aphasia deficits.      Additional Pertinent Hx (if applicable):            Learning/Language: Learning  Does the patient/guardian have any barriers to learning?: No barriers  Will there be a co-learner?: No  What is the preferred language of the patient/guardian?: English  Is an  required?: No  How does the patient/guardian prefer to learn new concepts?: Listening, Reading, Demonstration, Pictures/Videos     Pain Screening   Pain Screening  Patient Currently in Pain: Yes  Pain Assessment: 0-10  Pain Level: 7  Pain Location: Knee  Pain Orientation: Right  Pain Descriptors: Edwar Daniels  Pain Frequency: Continuous  Aggravating factors: Walking, Standing  Pain Management/Relieving Factors: Rest    Functional Status         Social History:  Social History  Lives With: Spouse  Type of Home: House  Home Layout: Multi-level (3 flights of 7 - 8 stairs)  Home Access: Stairs to enter without rails  Entrance Stairs - Number of Steps: 1  Bathroom Shower/Tub: Walk-in shower  Home Equipment: Cane    Occupation/Interests:  Type of Occupation:     Prior Level of Function:  Independent          Current Level of Function:  Independent              OBJECTIVE EXAMINATION     Review of Systems:  Follows Commands: Within Functional Limits    Palpation:    No TTP    Ambulation/Gait (if applicable):  Ambulation  Quality of Gait: Right antalgic gait with right LE external rotation    Left AROM  Right AROM         AROM LLE (degrees)  L Knee Flexion 0-145: 125  L Knee Extension 0: 0    AROM RLE (degrees)  R Knee Flexion 0-145: 115  R Knee Extension 0: 5     Left PROM  Right PROM      General PROM LE: Other (comment) (Limited internal rotation bilaterally)    General PROM LE: Other (comment) (Limited internal rotation bilaterally)  PROM RLE (degrees)  R Knee Flexion 0-145: 120  R Knee Extension 0: 2     Left Strength  Right Strength         Strength LLE  L Hip Flexion: 4/5  L Hip Extension: 4/5  L Hip ABduction: 4/5  L Knee Flexion: 5/5  L Knee Extension: 5/5  L Ankle Dorsiflexion: 5/5    Strength RLE  R Hip Flexion: 4/5  R Hip Extension: 4/5  R Hip ABduction: 4/5  R Hip External Rotation: 4+/5 (Pain)  R Knee Flexion: 4+/5        ASSESSMENT     Impression:  Patient would benefit from skilled physical therapy services in order to: The patient is a 45 y/o male that presents with right knee pain. The patient presents with impairments of increased pain, decreased ROM, strength and activity tolerance. The patient is scheduled for a right TKA with Dr. Kay Funes on 10/4/2022.  The patient was educated in surgical procedure, current HEP and HEP to be completed following surgery. The patient will benefit from therapy services to address the above impairments of to return to previous level of activity. Body Structures, Functions, Activity Limitations Requiring Skilled Therapeutic Intervention: Decreased functional mobility , Decreased ADL status, Decreased ROM, Decreased tolerance to work activity, Decreased strength, Decreased endurance, Increased pain    Statement of Medical Necessity: Physical Therapy is both indicated and medically necessary as outlined in the POC to increase the likelihood of meeting the functionally related goals stated below. Patient agrees with established plan of care and assisted in the development of their short term and long term goals. Patient had no adverse reaction with initial treatment and there are no barriers to learning. Learning preferences include demonstration, practice, and handouts. Patient expressed understanding of HEP and appears to be motivated to participate in an active PT program including compliance with HEP expectations.        Patient's Activity Tolerance: Patient tolerated evaluation without incident      Patient's rehabilitation potential/prognosis is considered to be: Good    Factors which may impact rehabilitation potential include:          GOALS   Patient Goal(s): Decrease pain  Short Term Goals Completed by 1 treatment Goal Status   Patient will be educated surgical procedure and rehab expectaions     Patient will demonstrate independence in HEP                                                         Long Term Goals Completed by   Goal Status                                                                    TREATMENT PLAN       Requires PT Follow-Up: Yes  Specific Instructions for Next Treatment: Reassess following TKA    Pt. actively involved in establishing Plan of Care and Goals: Yes  Patient/ Caregiver education and instruction: Goals, PT Role, Plan of Care, Evaluative findings, Insurance, Home Exercise Program, General Safety, Equipment, Adaptive Device Training, Gait Training, Anatomy of condition             Treatment may include any combination of the following: Strengthening, ROM, Balance training, Functional mobility training, Transfer training, ADL/Self-care training, Endurance training, Manual Therapy - Soft Tissue Mobilization, Manual Therapy - Joint Manipulation, Pain management, Home exercise program, Safety education & training, Therapeutic activities     Frequency / Duration:  Patient to be seen 1 treatment session for   weeks      Eval Complexity: Overall Evaluation : Low       Therapy Time  Individual Time In:         Individual Time Out:    Minutes: Therapist Signature: Bill Spicer, SUKHJINDER    Date: 8/08/9123     I certify that the above Therapy Services are being furnished while the patient is under my care. I agree with the treatment plan and certify that this therapy is necessary. [de-identified] Signature:  ___________________________   Date:_______                                                                   Luis Eduardo Magallon DO        Physician Comments: _______________________________________________    Please sign and return to [address]. Please fax to the location listed below.  [facility]ra[facility] Mcrae for this referral!    [facility] [address], [address] [id number]  Dept: [phone]  Loc: [phone]       POC NOTE

## 2022-09-20 NOTE — PLAN OF CARE
Frequency/Duration:  # Days per week: [x] 1 day # Weeks: [] 1 week [] 5 weeks     [] 2 days   [] 2 weeks [] 6 weeks     [] 3 days   [] 3 weeks [] 7 weeks     [] 4 days   [] 4 weeks [] 8 weeks         [] 9 weeks [] 10 weeks         [] 11 weeks [] 12 weeks    Rehab Potential/Progress: [] Excellent [x] Good [] Fair  [] Poor     Goals:    Patient goals: Decrease pain  Short term goals  Time Frame for Short term goals: 1 treatment  Patient will be educated surgical procedure and rehab expectaions  Patient will demonstrate independence in HEP          Electronically signed by: Eleno Rebolledo PT, DPT, 9/20/2022, 12:38 PM        If you have any questions or concerns, please don't hesitate to call.   Thank you for your referral.      Physician Signature:________________________________Date:_________ TIME: _____  By signing above, therapists plan is approved by physician

## 2022-09-20 NOTE — FLOWSHEET NOTE
Outpatient Physical Therapy  [facility]           [x] Phone: [phone]   Fax: [phone]  [facility]           [] Phone: [phone]   Fax: [phone]        Physical Therapy Daily Treatment Note  Date:  2022    Patient Name:  Alexandr Colbert    :  1970  MRN: [id number]  Restrictions/Precautions: No data recorded      Diagnosis:     Primary osteoarthritis of right knee [M17.11]  Chronic pain of right knee [M25.561, G89.29] Diagnosis: Right TKA 10/4/22  Date of Injury/Surgery: 10/4/2022  Treatment Diagnosis:  Right knee pain  Insurance/Certification information: Shan Maker  Referring Physician:  DO King Pandey DO   PCP: Raghavendra Murphy MD  Next Doctor Visit:    Plan of care signed (Y/N):    Outcome Measure:   Visit# / total visits: 1  /  Pain level: 7/10   Goals:     Patient goals: Decrease pain  Short term goals  Time Frame for Short term goals: 1 treatment  Patient will be educated surgical procedure and rehab expectaions  Patient will demonstrate independence in HEP           Long Term Goals  Time Frame for Long Term Goals:                       Summary of Evaluation:           Subjective:  See eval         Any changes in Ambulatory Summary Sheet? None        Objective:  See eval   COVID screening questions were asked and patient attested that there had been no contact or symptoms        Exercises: (No more than 4 columns)   Exercise/Equipment Date Date Date           WARM UP                     TABLE                                       STANDING                                                     PROPRIOCEPTION                                    MODALITIES                      Other Therapeutic Activities/Education:        Home Exercise Program:      Access Code: [email or web address]  URL: [email or web address]  Date: 2022  Prepared by:  Amanda Neil    Exercises  Clamshell - 1 x daily - 7 x weekly - 3 sets - 10 reps  Supine Active Straight Leg Raise - 1 x daily - 7 x weekly - 3 sets - 10 reps  Prone Hip Extension - 1 x daily - 7 x weekly - 3 sets - 10 reps      Access Code: Dania Escobedo  URL: [email or web address]  Date: 09/20/2022  Prepared by: Eleno Rebolledo    Exercises  Supine Heel Slide with Strap - 1 x daily - 7 x weekly - 3 sets - 10 reps  Long Sitting Quad Set with Towel Roll Under Heel - 1 x daily - 7 x weekly - 3 sets - 10 reps  Supine Ankle Pumps - 1 x daily - 7 x weekly - 3 sets - 10 reps  Staggered Stance Forward Backward Weight Shift with Unilateral Counter Support - 1 x daily - 7 x weekly - 3 sets - 10 reps  Side to Side Weight Shift with Counter Support - 1 x daily - 7 x weekly - 3 sets - 10 reps      Manual Treatments:        Modalities:        Communication with other providers:        Assessment:  (Response towards treatment session) (Pain Rating)   Patient would benefit from skilled physical therapy services in order to: The patient is a 45 y/o male that presents with right knee pain. The patient presents with impairments of increased pain, decreased ROM, strength and activity tolerance. The patient is scheduled for a right TKA with Dr. Nelli Bates on 10/4/2022. The patient was educated in surgical procedure, current HEP and HEP to be completed following surgery. The patient will benefit from therapy services to address the above impairments of to return to previous level of activity.       Plan for Next Session:   Specific Instructions for Next Treatment: Reassess following TKA    Time In / Time Out:        Time In  Time Out    [id number]       Timed Code/Total Treatment Minutes:      Treatment Charges: Mins Units   [x] Evaluation       [x]  Low       []  Moderate       []  High 20 1   []  Modalities     [x]  Ther Exercise 25 2   []  Manual Therapy     []  Ther Activities     []  Aquatics     []  Vasocompression     []  Other         Next Progress Note due:        Plan of Care Interventions:  [x] Therapeutic Exercise  [] Modalities:  [x] Therapeutic Activity [] Ultrasound  [] Estim  [x] Gait Training      [] Cervical Traction [] Lumbar Traction  [x] Neuromuscular Re-education    [x] Cold/hotpack [] Iontophoresis   [x] Instruction in HEP      [x] Vasopneumatic   [x] Dry Needling    [x] Manual Therapy               [] Aquatic Therapy              Electronically signed by:   Jh López PT, 9/20/2022, 12:40 PM

## 2022-09-28 ENCOUNTER — HOSPITAL ENCOUNTER (OUTPATIENT)
Dept: GENERAL RADIOLOGY | Age: 52
Discharge: HOME | End: 2022-09-28
Payer: COMMERCIAL

## 2022-09-28 ENCOUNTER — HOSPITAL ENCOUNTER (OUTPATIENT)
Age: 52
Discharge: HOME | End: 2022-09-28
Payer: COMMERCIAL

## 2022-09-28 DIAGNOSIS — Z01.818 PREOP TESTING: ICD-10-CM

## 2022-09-28 LAB
ANION GAP SERPL CALCULATED.3IONS-SCNC: 11 MMOL/L (ref 4–16)
BASOPHILS ABSOLUTE: 0.1 K/CU MM
BASOPHILS RELATIVE PERCENT: 0.8 % (ref 0–1)
BILIRUBIN URINE: NEGATIVE MG/DL
BLOOD, URINE: NEGATIVE
BUN BLDV-MCNC: 10 MG/DL (ref 6–23)
CALCIUM SERPL-MCNC: 9.6 MG/DL (ref 8.3–10.6)
CHLORIDE BLD-SCNC: 102 MMOL/L (ref 99–110)
CLARITY: CLEAR
CO2: 27 MMOL/L (ref 21–32)
COLOR: YELLOW
CREAT SERPL-MCNC: 0.7 MG/DL (ref 0.9–1.3)
DIFFERENTIAL TYPE: ABNORMAL
EKG ATRIAL RATE: 87 BPM
EKG DIAGNOSIS: NORMAL
EKG P AXIS: 78 DEGREES
EKG P-R INTERVAL: 144 MS
EKG Q-T INTERVAL: 366 MS
EKG QRS DURATION: 100 MS
EKG QTC CALCULATION (BAZETT): 440 MS
EKG R AXIS: 84 DEGREES
EKG T AXIS: 79 DEGREES
EKG VENTRICULAR RATE: 87 BPM
EOSINOPHILS ABSOLUTE: 0.4 K/CU MM
EOSINOPHILS RELATIVE PERCENT: 3.9 % (ref 0–3)
ESTIMATED AVERAGE GLUCOSE: 146 MG/DL
GFR AFRICAN AMERICAN: >60 ML/MIN/1.73M2
GFR NON-AFRICAN AMERICAN: >60 ML/MIN/1.73M2
GLUCOSE FASTING: 92 MG/DL (ref 70–99)
GLUCOSE, URINE: >1000 MG/DL
HBA1C MFR BLD: 6.7 % (ref 4.2–6.3)
HCT VFR BLD CALC: 53.9 % (ref 42–52)
HEMOGLOBIN: 17.5 GM/DL (ref 13.5–18)
IMMATURE NEUTROPHIL %: 0.3 % (ref 0–0.43)
KETONES, URINE: NEGATIVE MG/DL
LEUKOCYTE ESTERASE, URINE: NEGATIVE
LYMPHOCYTES ABSOLUTE: 2.5 K/CU MM
LYMPHOCYTES RELATIVE PERCENT: 23.7 % (ref 24–44)
MCH RBC QN AUTO: 27.6 PG (ref 27–31)
MCHC RBC AUTO-ENTMCNC: 32.5 % (ref 32–36)
MCV RBC AUTO: 85.2 FL (ref 78–100)
MONOCYTES ABSOLUTE: 1 K/CU MM
MONOCYTES RELATIVE PERCENT: 9.7 % (ref 0–4)
NITRITE URINE, QUANTITATIVE: NEGATIVE
PDW BLD-RTO: 14.5 % (ref 11.7–14.9)
PH, URINE: 5.5 (ref 5–8)
PLATELET # BLD: 291 K/CU MM (ref 140–440)
PMV BLD AUTO: 9.9 FL (ref 7.5–11.1)
POTASSIUM SERPL-SCNC: 4.1 MMOL/L (ref 3.5–5.1)
PROTEIN UA: NEGATIVE MG/DL
RBC # BLD: 6.33 M/CU MM (ref 4.6–6.2)
SEGMENTED NEUTROPHILS ABSOLUTE COUNT: 6.6 K/CU MM
SEGMENTED NEUTROPHILS RELATIVE PERCENT: 61.6 % (ref 36–66)
SODIUM BLD-SCNC: 140 MMOL/L (ref 135–145)
SPECIFIC GRAVITY UA: <1.005 (ref 1–1.03)
TOTAL IMMATURE NEUTOROPHIL: 0.03 K/CU MM
UROBILINOGEN, URINE: 0.2 MG/DL (ref 0.2–1)
WBC # BLD: 10.6 K/CU MM (ref 4–10.5)

## 2022-09-28 PROCEDURE — 93005 ELECTROCARDIOGRAM TRACING: CPT | Performed by: ORTHOPAEDIC SURGERY

## 2022-09-28 PROCEDURE — 93010 ELECTROCARDIOGRAM REPORT: CPT | Performed by: INTERNAL MEDICINE

## 2022-09-28 PROCEDURE — 36415 COLL VENOUS BLD VENIPUNCTURE: CPT

## 2022-09-28 PROCEDURE — 71046 X-RAY EXAM CHEST 2 VIEWS: CPT

## 2022-09-28 PROCEDURE — 85025 COMPLETE CBC W/AUTO DIFF WBC: CPT

## 2022-09-28 PROCEDURE — 87086 URINE CULTURE/COLONY COUNT: CPT

## 2022-09-28 PROCEDURE — 81003 URINALYSIS AUTO W/O SCOPE: CPT

## 2022-09-28 PROCEDURE — 80048 BASIC METABOLIC PNL TOTAL CA: CPT

## 2022-09-28 PROCEDURE — 83036 HEMOGLOBIN GLYCOSYLATED A1C: CPT

## 2022-09-28 NOTE — PROGRESS NOTES
Patient aware we will call him Monday afternoon with arrival time. Surgery Date:   10/4/22                                 Arrive at: TBD  Surgery time: TBD      If your arrival time is before 7 AM come in the emergency room entrance. If after 7 AM come in the main entrance. Two visitors may accompany you to the hospital.  Everyone must wear a mask and be free of covid symptoms. 1. Do not eat or drink anything after midnight - unless instructed by your doctor prior to surgery. This includes                  no water, chewing gum or mints. 2. Follow your directions as prescribed by the doctor for your procedure and medications. Take the following medications with a small sip of water the morning of: none                 3. Check with your Doctor regarding stopping Plavix, Coumadin, Lovenox, Effient, Pradaxa, Xarelto, Fragmin or other blood thinners and                  follow their instructions. 4. Do not smoke and do not drink any alcoholic beverages 24 hours prior to surgery. 5. You may brush your teeth and gargle the morning of surgery. DO NOT SWALLOW WATER  6. Please wear simple, loose fitting clothing to the hospital.   Palm not bring valuables (money, credit cards, checkbooks, etc.) Do not wear any                  makeup (including no eye makeup) or nail polish on your fingers or toes. 7.  DO NOT wear any jewelry or piercings on day of surgery. All body piercing jewelry must be removed. 8.  Take a shower the night before or morning of your procedure, do not apply any lotion, oil or powder. 9. If you have dentures, they will be removed before going to the OR; we will provide you a container. If you wear contact lenses or glasses,                 they will be removed; please bring a case for them. 10. If you  have a Living Will and Durable Power of  for Healthcare, please bring in a copy. 11. Please bring picture ID,  insurance card, paperwork from the doctors office    (H & P, Consent, & card for implantable devices). 12. You MUST make arrangements for a responsible adult to take you home after your surgery and be able to check on you every couple                  hours for the day. You will not be allowed to leave alone or drive yourself home. It is strongly suggested someone stay with you the first 24                  hrs. Your surgery will be cancelled if you do not have a ride home. 13. Wear a mask covering your nose & mouth when entering the hospital.     Please call [phone] with any questions.

## 2022-09-29 ENCOUNTER — ANESTHESIA EVENT (OUTPATIENT)
Dept: OPERATING ROOM | Age: 52
End: 2022-09-29
Payer: COMMERCIAL

## 2022-09-29 LAB
CULTURE: NORMAL
Lab: NORMAL
SPECIMEN: NORMAL

## 2022-09-29 ASSESSMENT — LIFESTYLE VARIABLES: SMOKING_STATUS: 1

## 2022-09-29 NOTE — ANESTHESIA PRE PROCEDURE
Department of Anesthesiology  Preprocedure Note       Name:  Thiago Amador   Age:  46 y.o.  :  1970                                          MRN:  [id number]         Date:  2022      Surgeon: Gerri Nicolas):  Ti Wheeler DO    Procedure: Procedure(s):  RIGHT KNEE TOTAL ARTHROPLASTY    Medications prior to admission:   Prior to Admission medications    Medication Sig Start Date End Date Taking? Authorizing Provider   buPROPion (WELLBUTRIN XL) 300 MG extended release tablet Take 300 mg by mouth every morning    Historical Provider, MD   fenofibrate (TRICOR) 48 MG tablet Take 144 mg by mouth daily    Historical Provider, MD   GLYXAMBI 10-5 MG TABS TAKE 1 TABLET BY ORAL ROUTE ONCE DAILY IN THE MORNING FOR 30 DAYS 20   Historical Provider, MD   FREESTYLE LITE strip TEST TWICE A DAY 20   Historical Provider, MD   FreeStyle Lancets MISC TEST TWICE A DAY 20   Historical Provider, MD   metFORMIN (GLUCOPHAGE-XR) 500 MG extended release tablet TAKE 1 TABLET BY MOUTH EVERY DAY WITH EVENING MEAL 20   Historical Provider, MD   atorvastatin (LIPITOR) 80 MG tablet TAKE 1 TABLET BY MOUTH EVERY DAY 20   Historical Provider, MD       Current medications:    Current Outpatient Medications   Medication Sig Dispense Refill    buPROPion (WELLBUTRIN XL) 300 MG extended release tablet Take 300 mg by mouth every morning      fenofibrate (TRICOR) 48 MG tablet Take 144 mg by mouth daily      GLYXAMBI 10-5 MG TABS TAKE 1 TABLET BY ORAL ROUTE ONCE DAILY IN THE MORNING FOR 30 DAYS      FREESTYLE LITE strip TEST TWICE A DAY      FreeStyle Lancets MISC TEST TWICE A DAY      metFORMIN (GLUCOPHAGE-XR) 500 MG extended release tablet TAKE 1 TABLET BY MOUTH EVERY DAY WITH EVENING MEAL      atorvastatin (LIPITOR) 80 MG tablet TAKE 1 TABLET BY MOUTH EVERY DAY       No current facility-administered medications for this visit.        Allergies:    No Known Allergies    Problem List:    Patient Active Problem List Diagnosis Code    Knee pain, bilateral M25.561, M25.562       Past Medical History:        Diagnosis Date    Cerebral artery occlusion with cerebral infarction [address] [address]     \"had stroke 3/30/2020- had subarachnoid hemorrhage- dont remember my symtoms - all resolved now\"    Diabetes mellitus ([facility] [address]     2020    History of short term memory loss     \"this has gotten better\"    Hyperlipidemia     Personal history of other medical treatment     per pt \"my wife and daughter have malignant hyperthermia\"    Subarachnoid hemorrhage ([facility] [address] 2020    Wears glasses     to read       Past Surgical History:        Procedure Laterality Date    KNEE ARTHROSCOPY Right 10/1/2020    RIGHT KNEE ARTHROSCOPY , LATERAL MENISCECTOMY , LATERAL OPEN , CYST EXCISION performed by [place] DO at [address]      OTHER SURGICAL HISTORY  2020    per old chart at [facility] had diagnostic angiogram for the [address]- 3/2020, 2020 and 2020   [address]         Social History:    Social History     Tobacco Use    Smoking status: Every Day     Packs/day: 0.50     Years: 25.00     Pack years: 12.50     Types: Cigarettes     Last attempt to quit: 3/30/2020     Years since quittin.5    Smokeless tobacco: Never   Substance Use Topics    Alcohol use: No                                Ready to quit: Not Answered  Counseling given: Not Answered      Vital Signs (Current): There were no vitals filed for this visit.                                            BP Readings from Last 3 Encounters:   22 (!) 127/90   22 116/86   22 134/81       NPO Status:                                                                                 BMI:   Wt Readings from Last 3 Encounters:   22 198 lb (89.8 kg)   22 208 lb 12.8 oz (94.7 kg)   21 220 lb (99.8 kg)     There is no height or weight on file to calculate BMI.    CBC:   Lab Results Component Value Date/Time    WBC 10.6 09/28/2022 12:50 PM    RBC 6.33 09/28/2022 12:50 PM    HGB 17.5 09/28/2022 12:50 PM    HCT 53.9 09/28/2022 12:50 PM    MCV 85.2 09/28/2022 12:50 PM    RDW 14.5 09/28/2022 12:50 PM     09/28/2022 12:50 PM       CMP:   Lab Results   Component Value Date/Time     09/28/2022 12:50 PM    K 4.1 09/28/2022 12:50 PM     09/28/2022 12:50 PM    CO2 27 09/28/2022 12:50 PM    BUN 10 09/28/2022 12:50 PM    CREATININE 0.7 09/28/2022 12:50 PM    GFRAA >60 09/28/2022 12:50 PM    LABGLOM >60 09/28/2022 12:50 PM    GLUCOSE 132 10/01/2020 07:45 AM    PROT 6.0 03/30/2020 09:15 PM    CALCIUM 9.6 09/28/2022 12:50 PM    BILITOT 0.1 03/30/2020 09:15 PM    ALKPHOS 92 03/30/2020 09:15 PM    AST 16 03/30/2020 09:15 PM    ALT 22 03/30/2020 09:15 PM       POC Tests: No results for input(s): POCGLU, POCNA, POCK, POCCL, POCBUN, POCHEMO, POCHCT in the last 72 hours. Coags:   Lab Results   Component Value Date/Time    PROTIME 11.4 03/30/2020 09:15 PM    INR 0.94 03/30/2020 09:15 PM       HCG (If Applicable): No results found for: PREGTESTUR, PREGSERUM, HCG, HCGQUANT     ABGs: No results found for: PHART, PO2ART, JKO3IGD, GJG7TGN, BEART, V3VXZFEJ     Type & Screen (If Applicable):  No results found for: LABABO, LABRH    Drug/Infectious Status (If Applicable):  No results found for: HIV, HEPCAB    COVID-19 Screening (If Applicable):   Lab Results   Component Value Date/Time    COVID19 NOT DETECTED 09/24/2020 09:45 AM         Anesthesia Evaluation  Patient summary reviewed and Nursing notes reviewed  Airway: Mallampati: II  TM distance: <3 FB   Neck ROM: full  Mouth opening: > = 3 FB   Dental: normal exam         Pulmonary:   (+) current smoker          Patient did not smoke on day of surgery.                  Cardiovascular:  Exercise tolerance: good (>4 METS),   (+) hyperlipidemia      ECG reviewed      Echocardiogram reviewed               ROS comment: Normal sinus rhythm   Normal ECG When compared with ECG of 30-MAR-2020 21:39,   No significant change was found   Confirmed by [facility] MD, [facility] ([id number]) on 9/28/2022 5:01:52 PM      Neuro/Psych:   (+) CVA: no interval change, depression/anxiety              ROS comment: 3/2020 hemorrhagic bleed brain, rt vasoconstriction osu, no residual deficits      Again reviewed natural history, prognosis of reversisble vasconstriction syndrome. No contra-indication to proceed with knee surgery from neurological perspective. Follow up PRN. Electronically signed by Sylvester Montgomery MD at 09/30/2022 1:56 PM EDT   GI/Hepatic/Renal:             Buddie Puls:    (+) DiabetesType II DM, well controlled, , : arthritis: OA., . Pt had no PAT visit       Abdominal:             Vascular: negative vascular ROS. Other Findings:             Anesthesia Plan      spinal, regional and MAC     ASA 2     (Risks benefits of  sab block and geta discussed pt agrees to proceed)  Induction: intravenous. MIPS: Postoperative opioids intended and Prophylactic antiemetics administered. Anesthetic plan and risks discussed with patient. Use of blood products discussed with patient whom. Plan discussed with attending and CRNA.           Post-op pain plan if not by surgeon: single peripheral nerve block            RHETT Robertson - CRNA   9/29/2022

## 2022-10-04 ENCOUNTER — APPOINTMENT (OUTPATIENT)
Dept: GENERAL RADIOLOGY | Age: 52
End: 2022-10-04
Attending: ORTHOPAEDIC SURGERY
Payer: COMMERCIAL

## 2022-10-04 ENCOUNTER — HOSPITAL ENCOUNTER (OUTPATIENT)
Age: 52
Discharge: HOME | End: 2022-10-05
Attending: ORTHOPAEDIC SURGERY | Admitting: ORTHOPAEDIC SURGERY
Payer: COMMERCIAL

## 2022-10-04 ENCOUNTER — ANESTHESIA (OUTPATIENT)
Dept: OPERATING ROOM | Age: 52
End: 2022-10-04
Payer: COMMERCIAL

## 2022-10-04 DIAGNOSIS — Z96.651 HISTORY OF TOTAL RIGHT KNEE REPLACEMENT: ICD-10-CM

## 2022-10-04 DIAGNOSIS — Z01.818 PREOP TESTING: Primary | ICD-10-CM

## 2022-10-04 LAB
GLUCOSE BLD-MCNC: 105 MG/DL (ref 70–99)
GLUCOSE BLD-MCNC: 115 MG/DL (ref 70–99)
GLUCOSE BLD-MCNC: 158 MG/DL (ref 70–99)
GLUCOSE BLD-MCNC: 186 MG/DL (ref 70–99)

## 2022-10-04 PROCEDURE — 2500000003 HC RX 250 WO HCPCS: Performed by: NURSE ANESTHETIST, CERTIFIED REGISTERED

## 2022-10-04 PROCEDURE — 6360000002 HC RX W HCPCS: Performed by: ORTHOPAEDIC SURGERY

## 2022-10-04 PROCEDURE — 2580000003 HC RX 258: Performed by: ORTHOPAEDIC SURGERY

## 2022-10-04 PROCEDURE — 2500000003 HC RX 250 WO HCPCS: Performed by: ORTHOPAEDIC SURGERY

## 2022-10-04 PROCEDURE — C1776 JOINT DEVICE (IMPLANTABLE): HCPCS | Performed by: ORTHOPAEDIC SURGERY

## 2022-10-04 PROCEDURE — 3700000001 HC ADD 15 MINUTES (ANESTHESIA): Performed by: ORTHOPAEDIC SURGERY

## 2022-10-04 PROCEDURE — 3700000000 HC ANESTHESIA ATTENDED CARE: Performed by: ORTHOPAEDIC SURGERY

## 2022-10-04 PROCEDURE — 27447 TOTAL KNEE ARTHROPLASTY: CPT | Performed by: ORTHOPAEDIC SURGERY

## 2022-10-04 PROCEDURE — 2720000010 HC SURG SUPPLY STERILE: Performed by: ORTHOPAEDIC SURGERY

## 2022-10-04 PROCEDURE — 82962 GLUCOSE BLOOD TEST: CPT

## 2022-10-04 PROCEDURE — 6370000000 HC RX 637 (ALT 250 FOR IP): Performed by: ORTHOPAEDIC SURGERY

## 2022-10-04 PROCEDURE — 6360000002 HC RX W HCPCS: Performed by: NURSE ANESTHETIST, CERTIFIED REGISTERED

## 2022-10-04 PROCEDURE — 27447 TOTAL KNEE ARTHROPLASTY: CPT | Performed by: PHYSICIAN ASSISTANT

## 2022-10-04 PROCEDURE — 7100000001 HC PACU RECOVERY - ADDTL 15 MIN: Performed by: ORTHOPAEDIC SURGERY

## 2022-10-04 PROCEDURE — 3600000005 HC SURGERY LEVEL 5 BASE: Performed by: ORTHOPAEDIC SURGERY

## 2022-10-04 PROCEDURE — 97162 PT EVAL MOD COMPLEX 30 MIN: CPT

## 2022-10-04 PROCEDURE — 73560 X-RAY EXAM OF KNEE 1 OR 2: CPT

## 2022-10-04 PROCEDURE — 94150 VITAL CAPACITY TEST: CPT

## 2022-10-04 PROCEDURE — 7100000000 HC PACU RECOVERY - FIRST 15 MIN: Performed by: ORTHOPAEDIC SURGERY

## 2022-10-04 PROCEDURE — 2709999900 HC NON-CHARGEABLE SUPPLY: Performed by: ORTHOPAEDIC SURGERY

## 2022-10-04 PROCEDURE — 6370000000 HC RX 637 (ALT 250 FOR IP): Performed by: NURSE PRACTITIONER

## 2022-10-04 PROCEDURE — A4217 STERILE WATER/SALINE, 500 ML: HCPCS | Performed by: ORTHOPAEDIC SURGERY

## 2022-10-04 PROCEDURE — 3600000015 HC SURGERY LEVEL 5 ADDTL 15MIN: Performed by: ORTHOPAEDIC SURGERY

## 2022-10-04 RX ORDER — ROPIVACAINE HYDROCHLORIDE 5 MG/ML
INJECTION, SOLUTION EPIDURAL; INFILTRATION; PERINEURAL
Status: COMPLETED | OUTPATIENT
Start: 2022-10-04 | End: 2022-10-04

## 2022-10-04 RX ORDER — SODIUM CHLORIDE 9 MG/ML
INJECTION, SOLUTION INTRAVENOUS PRN
Status: DISCONTINUED | OUTPATIENT
Start: 2022-10-04 | End: 2022-10-05 | Stop reason: HOSPADM

## 2022-10-04 RX ORDER — SODIUM CHLORIDE 0.9 % (FLUSH) 0.9 %
5-40 SYRINGE (ML) INJECTION EVERY 12 HOURS SCHEDULED
Status: DISCONTINUED | OUTPATIENT
Start: 2022-10-04 | End: 2022-10-04 | Stop reason: HOSPADM

## 2022-10-04 RX ORDER — SODIUM CHLORIDE 0.9 % (FLUSH) 0.9 %
5-40 SYRINGE (ML) INJECTION PRN
Status: DISCONTINUED | OUTPATIENT
Start: 2022-10-04 | End: 2022-10-04 | Stop reason: HOSPADM

## 2022-10-04 RX ORDER — SODIUM CHLORIDE, SODIUM LACTATE, POTASSIUM CHLORIDE, CALCIUM CHLORIDE 600; 310; 30; 20 MG/100ML; MG/100ML; MG/100ML; MG/100ML
INJECTION, SOLUTION INTRAVENOUS CONTINUOUS
Status: DISCONTINUED | OUTPATIENT
Start: 2022-10-04 | End: 2022-10-05

## 2022-10-04 RX ORDER — ONDANSETRON 2 MG/ML
INJECTION INTRAMUSCULAR; INTRAVENOUS PRN
Status: DISCONTINUED | OUTPATIENT
Start: 2022-10-04 | End: 2022-10-04 | Stop reason: SDUPTHER

## 2022-10-04 RX ORDER — ACETAMINOPHEN 325 MG/1
650 TABLET ORAL EVERY 6 HOURS
Status: DISCONTINUED | OUTPATIENT
Start: 2022-10-04 | End: 2022-10-05 | Stop reason: HOSPADM

## 2022-10-04 RX ORDER — DEXAMETHASONE SODIUM PHOSPHATE 4 MG/ML
INJECTION, SOLUTION INTRA-ARTICULAR; INTRALESIONAL; INTRAMUSCULAR; INTRAVENOUS; SOFT TISSUE PRN
Status: DISCONTINUED | OUTPATIENT
Start: 2022-10-04 | End: 2022-10-04 | Stop reason: SDUPTHER

## 2022-10-04 RX ORDER — KETAMINE HCL 50MG/ML(1)
SYRINGE (ML) INTRAVENOUS PRN
Status: DISCONTINUED | OUTPATIENT
Start: 2022-10-04 | End: 2022-10-04 | Stop reason: SDUPTHER

## 2022-10-04 RX ORDER — ATORVASTATIN CALCIUM 80 MG/1
80 TABLET, FILM COATED ORAL NIGHTLY
Status: DISCONTINUED | OUTPATIENT
Start: 2022-10-04 | End: 2022-10-05 | Stop reason: HOSPADM

## 2022-10-04 RX ORDER — METFORMIN HYDROCHLORIDE 500 MG/1
500 TABLET, EXTENDED RELEASE ORAL
Status: DISCONTINUED | OUTPATIENT
Start: 2022-10-04 | End: 2022-10-05 | Stop reason: HOSPADM

## 2022-10-04 RX ORDER — SODIUM CHLORIDE 0.9 % (FLUSH) 0.9 %
5-40 SYRINGE (ML) INJECTION PRN
Status: DISCONTINUED | OUTPATIENT
Start: 2022-10-04 | End: 2022-10-05 | Stop reason: HOSPADM

## 2022-10-04 RX ORDER — SODIUM CHLORIDE 9 MG/ML
INJECTION, SOLUTION INTRAVENOUS PRN
Status: DISCONTINUED | OUTPATIENT
Start: 2022-10-04 | End: 2022-10-04 | Stop reason: HOSPADM

## 2022-10-04 RX ORDER — BUPIVACAINE HYDROCHLORIDE 5 MG/ML
INJECTION, SOLUTION EPIDURAL; INTRACAUDAL
Status: COMPLETED | OUTPATIENT
Start: 2022-10-04 | End: 2022-10-04

## 2022-10-04 RX ORDER — TRANEXAMIC ACID 100 MG/ML
INJECTION, SOLUTION INTRAVENOUS
Status: COMPLETED | OUTPATIENT
Start: 2022-10-04 | End: 2022-10-04

## 2022-10-04 RX ORDER — SODIUM CHLORIDE, SODIUM LACTATE, POTASSIUM CHLORIDE, CALCIUM CHLORIDE 600; 310; 30; 20 MG/100ML; MG/100ML; MG/100ML; MG/100ML
INJECTION, SOLUTION INTRAVENOUS CONTINUOUS
Status: DISCONTINUED | OUTPATIENT
Start: 2022-10-04 | End: 2022-10-04 | Stop reason: HOSPADM

## 2022-10-04 RX ORDER — BUPROPION HYDROCHLORIDE 150 MG/1
300 TABLET ORAL EVERY MORNING
Status: DISCONTINUED | OUTPATIENT
Start: 2022-10-05 | End: 2022-10-05 | Stop reason: HOSPADM

## 2022-10-04 RX ORDER — PROPOFOL 10 MG/ML
INJECTION, EMULSION INTRAVENOUS CONTINUOUS PRN
Status: DISCONTINUED | OUTPATIENT
Start: 2022-10-04 | End: 2022-10-04 | Stop reason: SDUPTHER

## 2022-10-04 RX ORDER — SODIUM CHLORIDE 0.9 % (FLUSH) 0.9 %
5-40 SYRINGE (ML) INJECTION EVERY 12 HOURS SCHEDULED
Status: DISCONTINUED | OUTPATIENT
Start: 2022-10-04 | End: 2022-10-05 | Stop reason: HOSPADM

## 2022-10-04 RX ORDER — DEXTROSE MONOHYDRATE 100 MG/ML
INJECTION, SOLUTION INTRAVENOUS CONTINUOUS PRN
Status: DISCONTINUED | OUTPATIENT
Start: 2022-10-04 | End: 2022-10-05 | Stop reason: HOSPADM

## 2022-10-04 RX ORDER — TRANEXAMIC ACID 10 MG/ML
1000 INJECTION, SOLUTION INTRAVENOUS
Status: DISCONTINUED | OUTPATIENT
Start: 2022-10-04 | End: 2022-10-04 | Stop reason: HOSPADM

## 2022-10-04 RX ORDER — KETOROLAC TROMETHAMINE 15 MG/ML
15 INJECTION, SOLUTION INTRAMUSCULAR; INTRAVENOUS EVERY 6 HOURS PRN
Status: DISCONTINUED | OUTPATIENT
Start: 2022-10-04 | End: 2022-10-05 | Stop reason: HOSPADM

## 2022-10-04 RX ORDER — OXYCODONE HYDROCHLORIDE 10 MG/1
10 TABLET ORAL EVERY 4 HOURS PRN
Status: DISCONTINUED | OUTPATIENT
Start: 2022-10-04 | End: 2022-10-05 | Stop reason: HOSPADM

## 2022-10-04 RX ORDER — OXYCODONE HYDROCHLORIDE 5 MG/1
5 TABLET ORAL EVERY 4 HOURS PRN
Status: DISCONTINUED | OUTPATIENT
Start: 2022-10-04 | End: 2022-10-05 | Stop reason: HOSPADM

## 2022-10-04 RX ORDER — MIDAZOLAM HYDROCHLORIDE 1 MG/ML
INJECTION INTRAMUSCULAR; INTRAVENOUS PRN
Status: DISCONTINUED | OUTPATIENT
Start: 2022-10-04 | End: 2022-10-04 | Stop reason: SDUPTHER

## 2022-10-04 RX ORDER — FENOFIBRATE 160 MG/1
160 TABLET ORAL DAILY
Status: DISCONTINUED | OUTPATIENT
Start: 2022-10-04 | End: 2022-10-05 | Stop reason: HOSPADM

## 2022-10-04 RX ORDER — ACETAMINOPHEN 500 MG
1000 TABLET ORAL ONCE
Status: COMPLETED | OUTPATIENT
Start: 2022-10-04 | End: 2022-10-04

## 2022-10-04 RX ADMIN — SODIUM CHLORIDE, SODIUM LACTATE, POTASSIUM CHLORIDE, CALCIUM CHLORIDE: 600; 310; 30; 20 INJECTION, SOLUTION INTRAVENOUS at 09:08

## 2022-10-04 RX ADMIN — ONDANSETRON 4 MG: 2 INJECTION INTRAMUSCULAR; INTRAVENOUS at 11:33

## 2022-10-04 RX ADMIN — OXYCODONE HYDROCHLORIDE 10 MG: 10 TABLET ORAL at 18:45

## 2022-10-04 RX ADMIN — ACETAMINOPHEN 1000 MG: 500 TABLET ORAL at 09:22

## 2022-10-04 RX ADMIN — MIDAZOLAM 2 MG: 1 INJECTION INTRAMUSCULAR; INTRAVENOUS at 11:02

## 2022-10-04 RX ADMIN — DEXAMETHASONE SODIUM PHOSPHATE 4 MG: 4 INJECTION, SOLUTION INTRAMUSCULAR; INTRAVENOUS at 11:33

## 2022-10-04 RX ADMIN — BUPIVACAINE HYDROCHLORIDE 15 MG: 5 INJECTION, SOLUTION EPIDURAL; INTRACAUDAL; PERINEURAL at 11:15

## 2022-10-04 RX ADMIN — ASPIRIN 325 MG: 325 TABLET, COATED ORAL at 20:28

## 2022-10-04 RX ADMIN — CEFAZOLIN 2000 MG: 2 INJECTION, POWDER, FOR SOLUTION INTRAMUSCULAR; INTRAVENOUS at 11:28

## 2022-10-04 RX ADMIN — FENOFIBRATE 160 MG: 160 TABLET ORAL at 18:30

## 2022-10-04 RX ADMIN — SODIUM CHLORIDE, POTASSIUM CHLORIDE, SODIUM LACTATE AND CALCIUM CHLORIDE: 600; 310; 30; 20 INJECTION, SOLUTION INTRAVENOUS at 22:51

## 2022-10-04 RX ADMIN — OXYCODONE HYDROCHLORIDE 10 MG: 10 TABLET ORAL at 22:46

## 2022-10-04 RX ADMIN — ACETAMINOPHEN 650 MG: 325 TABLET ORAL at 20:28

## 2022-10-04 RX ADMIN — Medication 25 MG: at 11:28

## 2022-10-04 RX ADMIN — ATORVASTATIN CALCIUM 80 MG: 80 TABLET, FILM COATED ORAL at 20:29

## 2022-10-04 RX ADMIN — METFORMIN HYDROCHLORIDE 500 MG: 500 TABLET, EXTENDED RELEASE ORAL at 18:31

## 2022-10-04 RX ADMIN — SODIUM CHLORIDE, POTASSIUM CHLORIDE, SODIUM LACTATE AND CALCIUM CHLORIDE: 600; 310; 30; 20 INJECTION, SOLUTION INTRAVENOUS at 15:22

## 2022-10-04 RX ADMIN — PROPOFOL 150 MCG/KG/MIN: 10 INJECTION, EMULSION INTRAVENOUS at 11:28

## 2022-10-04 RX ADMIN — CEFAZOLIN 2000 MG: 1 INJECTION, POWDER, FOR SOLUTION INTRAMUSCULAR; INTRAVENOUS at 18:38

## 2022-10-04 RX ADMIN — SODIUM CHLORIDE 25 ML: 9 INJECTION, SOLUTION INTRAVENOUS at 18:36

## 2022-10-04 RX ADMIN — ACETAMINOPHEN 650 MG: 325 TABLET ORAL at 15:23

## 2022-10-04 RX ADMIN — KETOROLAC TROMETHAMINE 15 MG: 15 INJECTION, SOLUTION INTRAMUSCULAR; INTRAVENOUS at 19:42

## 2022-10-04 RX ADMIN — Medication 25 MG: at 11:45

## 2022-10-04 RX ADMIN — ROPIVACAINE HYDROCHLORIDE 20 ML: 5 INJECTION, SOLUTION EPIDURAL; INFILTRATION; PERINEURAL at 11:00

## 2022-10-04 ASSESSMENT — PAIN DESCRIPTION - ORIENTATION
ORIENTATION: RIGHT

## 2022-10-04 ASSESSMENT — PAIN DESCRIPTION - LOCATION
LOCATION: KNEE

## 2022-10-04 ASSESSMENT — PAIN SCALES - GENERAL
PAINLEVEL_OUTOF10: 8
PAINLEVEL_OUTOF10: 7
PAINLEVEL_OUTOF10: 0
PAINLEVEL_OUTOF10: 4
PAINLEVEL_OUTOF10: 9
PAINLEVEL_OUTOF10: 7
PAINLEVEL_OUTOF10: 0
PAINLEVEL_OUTOF10: 0

## 2022-10-04 ASSESSMENT — PAIN DESCRIPTION - DESCRIPTORS
DESCRIPTORS: ACHING
DESCRIPTORS: ACHING;THROBBING;STABBING
DESCRIPTORS: ACHING;STABBING;THROBBING
DESCRIPTORS: ACHING;THROBBING

## 2022-10-04 ASSESSMENT — PAIN DESCRIPTION - FREQUENCY: FREQUENCY: CONTINUOUS

## 2022-10-04 ASSESSMENT — PAIN DESCRIPTION - DIRECTION: RADIATING_TOWARDS: NO

## 2022-10-04 ASSESSMENT — PAIN - FUNCTIONAL ASSESSMENT: PAIN_FUNCTIONAL_ASSESSMENT: ACTIVITIES ARE NOT PREVENTED

## 2022-10-04 ASSESSMENT — PAIN DESCRIPTION - PAIN TYPE: TYPE: SURGICAL PAIN

## 2022-10-04 ASSESSMENT — PAIN DESCRIPTION - ONSET: ONSET: SUDDEN

## 2022-10-04 NOTE — CONSULTS
V2.0  [facility] Consult Note      Name:  Hannah Carrillo /Age/Sex: 1970  (46 y.o. male)   MRN & CSN:  [id number] & [id number] Encounter Date/Time: 10/4/2022 2:56 PM EDT   Location:  [address] [place] [address] PCP: Wilfredo Shelton MD     Attending:Irving Melo DO  Consulting Provider: Nhi Florez, APRN - [id number] [facility] Day: 1    Assessment and Recommendations   Hannah Carrillo is a 46 y.o. male who presents with History of total right knee replacement    Hospital Problems             Last Modified POA    * (Principal) History of total right knee replacement 10/4/2022 Yes     Primary osteoarthritis of right knee  -Postop day #0 right knee total arthroplasty  -Primary management per orthopedics  -PT OT consult  -Aspirin 325 p.o. twice daily  -As needed Toradol, oxycodone, and Dilaudid for pain    Other chronic conditions:  -Diabetes mellitus type 2: We will continue home Glyxambi and metformin, Accu-Cheks AC at bedtime  -Hyperlipidemia: We will continue home Lipitor and Tricor  -Depression: We will continue home Wellbutrin        Diet ADULT DIET; Regular; 5 carb choices (75 gm/meal)   DVT Prophylaxis [] Lovenox, []  Heparin, [x] SCDs, [] Ambulation,  [] Eliquis, [] Xarelto   Code Status Prior   Surrogate Decision Maker/ POA Wife   Hi  History Obtained From:    patient    History of Present Illness:     Chief Complaint: History of total right knee replacement    Hannah Carrillo is a 46 y.o. male who is seen today by the hospitalist team at the request of Dr. Meghann Briceño, for postoperative medical management. The patient presented [facility] for an elective right total knee arthroplasty. He is seen and examined postoperatively sitting in bed. His wife is at the bedside. He currently denies any complaints. Review of Systems:    Review of Systems   All other systems reviewed and are negative. Objective:      Intake/Output Summary (Last 24 hours) at 10/4/2022 6206  Last data filed at 10/4/2022 1405  Gross per 24 hour   Intake 1400 ml   Output 150 ml   Net 1250 ml      Vitals:   Vitals:    10/04/22 1345 10/04/22 1400 10/04/22 1405 10/04/22 1415   BP: 111/75 129/76  121/78   Pulse: 75 75  76   Resp: 16 16  16   Temp:   98 °F (36.7 °C)    TempSrc:   Temporal    SpO2: 94% 95%  95%   Weight:       Height:           Medications Prior to Admission     Prior to Admission medications    Medication Sig Start Date End Date Taking? Authorizing Provider   buPROPion (WELLBUTRIN XL) 300 MG extended release tablet Take 300 mg by mouth every morning    Historical Provider, MD   fenofibrate (TRICOR) 48 MG tablet Take 144 mg by mouth daily    Historical Provider, MD   GLYXAMBI 10-5 MG TABS TAKE 1 TABLET BY ORAL ROUTE ONCE DAILY IN THE MORNING FOR 30 DAYS 8/17/20   Historical Provider, MD   FREESTYLE LITE strip TEST TWICE A DAY 5/28/20   Historical Provider, MD   FreeStyle Lancets [address] [facility] TEST TWICE A DAY 5/28/20   Historical Provider, MD   metFORMIN (GLUCOPHAGE-XR) 500 MG extended release tablet TAKE 1 TABLET BY MOUTH EVERY DAY WITH EVENING MEAL 6/21/20   Historical Provider, MD   atorvastatin (LIPITOR) 80 MG tablet TAKE 1 TABLET BY MOUTH EVERY DAY 8/8/20   Historical Provider, MD       Physical Exam: Need 8 Elements   General: NAD  Eyes: EOMI  ENT: neck supple  Cardiovascular: Regular rate. Respiratory: Clear to auscultation  Gastrointestinal: Soft, non tender  Genitourinary: no suprapubic tenderness  Musculoskeletal: No edema  Skin: warm, dry, right leg Ace wrap intact  Neuro: Alert. Psych: Mood appropriate.        Past Medical History:   PMHx   Past Medical History:   Diagnosis Date    Cerebral artery occlusion with cerebral infarction ([address]     \"had stroke 3/30/2020- had subarachnoid hemorrhage- dont remember my symtoms - all resolved now\"    Diabetes mellitus ([address]     dx 2020    History of short term memory loss     \"this has gotten better\"    Hyperlipidemia     Personal history of other medical treatment     per pt \"my wife and daughter have malignant hyperthermia\"    Subarachnoid hemorrhage ([facility] [address] 2020    Wears glasses     to read     PSHX:  has a past surgical history that includes other surgical history (2020); Tonsillectomy (); Vasectomy (); Myringotomy Tympanostomy Tube Placement ('s); and Knee arthroscopy (Right, 10/1/2020). Allergies: No Known Allergies  Fam HX: family history includes Cancer in his mother; Stroke in his father. Soc HX:   Social History     Socioeconomic History    Marital status:      Spouse name: None    Number of children: None    Years of education: None    Highest education level: None   Tobacco Use    Smoking status: Every Day     Packs/day: 0.50     Years: 25.00     Pack years: 12.50     Types: Cigarettes     Last attempt to quit: 3/30/2020     Years since quittin.5    Smokeless tobacco: Never   Vaping Use    Vaping Use: Never used   Substance and Sexual Activity    Alcohol use: No    Drug use: No       Medications:   Medications:    sodium chloride flush  5-40 mL IntraVENous 2 times per day    acetaminophen  650 mg Oral Q6H    ceFAZolin (ANCEF) IVPB  2,000 mg IntraVENous Q8H    aspirin  325 mg Oral BID      Infusions:    lactated ringers      sodium chloride       PRN Meds: sodium chloride flush, 5-40 mL, PRN  sodium chloride, , PRN  ketorolac, 15 mg, Q6H PRN  oxyCODONE, 5 mg, Q4H PRN   Or  oxyCODONE, 10 mg, Q4H PRN  HYDROmorphone, 0.5 mg, Q3H PRN        Labs and Imaging   XR CHEST (2 VW)    Result Date: 2022  EXAMINATION: TWO XRAY VIEWS OF THE CHEST 2022 1:07 pm COMPARISON: 2020 HISTORY: ORDERING SYSTEM PROVIDED HISTORY: Preop testing TECHNOLOGIST PROVIDED HISTORY: Additional signs and symptoms: Preop testing FINDINGS: The cardiomediastinal silhouette is normal in size. The lungs are clear. No pleural effusion or pneumothorax is present. No acute cardiopulmonary process. CBC: No results for input(s): WBC, HGB, PLT in the last 72 hours.   BMP:  No results for input(s): NA, K, CL, CO2, BUN, CREATININE, GLUCOSE in the last 72 hours. Hepatic: No results for input(s): AST, ALT, ALB, BILITOT, ALKPHOS in the last 72 hours. Lipids:   Lab Results   Component Value Date/Time    HDL 29 06/09/2018 11:10 AM     Hemoglobin A1C:   Lab Results   Component Value Date/Time    LABA1C 6.7 09/28/2022 12:50 PM     TSH: No results found for: TSH  Troponin:   Lab Results   Component Value Date/Time    TROPONINT <0.010 03/30/2020 09:15 PM     Lactic Acid: No results for input(s): LACTA in the last 72 hours. BNP: No results for input(s): PROBNP in the last 72 hours.   UA:  Lab Results   Component Value Date/Time    NITRU NEGATIVE 09/28/2022 12:50 PM    COLORU YELLOW 09/28/2022 12:50 PM    CLARITYU CLEAR 09/28/2022 12:50 PM    SPECGRAV <1.005 09/28/2022 12:50 PM    LEUKOCYTESUR NEGATIVE 09/28/2022 12:50 PM    UROBILINOGEN 0.2 09/28/2022 12:50 PM    BILIRUBINUR NEGATIVE 09/28/2022 12:50 PM    BLOODU NEGATIVE 09/28/2022 12:50 PM    KETUA NEGATIVE 09/28/2022 12:50 PM     Urine Cultures: No results found for: LABURIN  Blood Cultures: No results found for: BC  No results found for: BLOODCULT2  Organism: No results found for: [facility]    Personally reviewed Lab Studies, Imaging, and discussed with Dr. Ellie Johnson    Electronically signed by RHETT Anglin CNP on 10/4/2022 at 2:56 PM

## 2022-10-04 NOTE — ANESTHESIA POSTPROCEDURE EVALUATION
Department of Anesthesiology  Postprocedure Note    Patient: Thiago Amador  MRN: [id number]  YOB: 1970  Date of evaluation: 10/4/2022      Procedure Summary     Date: 10/04/22 Room / Location: [address]    Anesthesia Start: 1113 Anesthesia Stop: 7554    Procedure: RIGHT KNEE TOTAL ARTHROPLASTY (Right: Knee) Diagnosis:       Primary osteoarthritis of right knee      Right knee pain, unspecified chronicity      (Primary osteoarthritis of right knee [M17.11])      (Right knee pain, unspecified chronicity [M25.561])    Surgeons: Ti Wheeler,  Responsible Provider: RHETT Michel CRNA    Anesthesia Type: spinal, regional, MAC ASA Status: 2          Anesthesia Type: No value filed.     Christopher Phase I: Christopher Score: 9    Christopher Phase II:        Anesthesia Post Evaluation    Patient location during evaluation: PACU  Patient participation: complete - patient participated  Level of consciousness: awake and alert  Pain score: 0  Airway patency: patent  Nausea & Vomiting: no nausea and no vomiting  Complications: no  Cardiovascular status: blood pressure returned to baseline  Respiratory status: acceptable and room air  Hydration status: euvolemic  Multimodal analgesia pain management approach

## 2022-10-04 NOTE — ANESTHESIA PROCEDURE NOTES
Spinal Block    Patient location during procedure: OR  End time: 10/4/2022 11:28 AM  Reason for block: primary anesthetic and at surgeon's request  Staffing  Performed: resident/CRNA   Resident/CRNA: RHETT Deleon CRNA  Spinal Block  Patient position: sitting  Prep: Betadine  Patient monitoring: cardiac monitor, continuous pulse ox, continuous capnometry and frequent blood pressure checks  Approach: midline  Location: L3/L4  Guidance: paresthesia technique  Provider prep: mask and sterile gloves  Local infiltration: lidocaine  Needle  Needle type: [place]   Needle gauge: 21 G  Needle length: 3.5 in  Assessment  Sensory level: T8  Swirl obtained: Yes  CSF: clear  Attempts: 2  Hemodynamics: stable  Preanesthetic Checklist  Completed: patient identified, IV checked, site marked, risks and benefits discussed, surgical/procedural consents, equipment checked, pre-op evaluation, timeout performed, anesthesia consent given, oxygen available, monitors applied/VS acknowledged, fire risk safety assessment completed and verbalized and blood product R/B/A discussed and consented

## 2022-10-04 NOTE — PROGRESS NOTES
1330 - transferred from OR on bed, monitor applied, alarms on and verified, bedside handoff provided by Berry Stewart CRNA  1350 - X-rays done at bedside  1402 - turned, repositioned, and linens straightened; patient tolerating well  1424 - report called to [place], phase one care complete; transferred to inpatient unit

## 2022-10-04 NOTE — PROGRESS NOTES
Physical Therapy  Facility/Department: [facility] UNIT  Physical Therapy Initial Assessment    Name: Ramses Hale  : 1970  MRN: [id number]  Date of Service: 10/4/2022    Discharge Recommendations:  Outpatient PT          Patient Diagnosis(es): The encounter diagnosis was Preop testing. Past Medical History:  has a past medical history of Cerebral artery occlusion with cerebral infarction ([facility] [address] Diabetes mellitus ([facility] [address] History of short term memory loss, Hyperlipidemia, Personal history of other medical treatment, Subarachnoid hemorrhage ([facility] [address] and Wears glasses. Past Surgical History:  has a past surgical history that includes other surgical history (2020); Tonsillectomy (); Vasectomy (); Myringotomy Tympanostomy Tube Placement (); and Knee arthroscopy (Right, 10/1/2020).     Assessment   Body Structures, Functions, Activity Limitations Requiring Skilled Therapeutic Intervention: Decreased functional mobility   Assessment: patient is s/p R TKA - will review stair climbing 10/5/22  Treatment Diagnosis: difficutly walking  Therapy Prognosis: Good  Decision Making: Medium Complexity  History: PF- R knee OA  Exam: trnsfrs/ ROM  Clinical Presentation: evolving  Barriers to Learning: none     Plan   Physcial Therapy Plan  General Plan:  (up to 2x/daily x 1 day)  Current Treatment Recommendations: Strengthening, ROM, Functional mobility training, Transfer training, Stair training, Gait training  Safety Devices  Type of Devices: Call light within reach, Gait belt, Left in bed     Restrictions  Restrictions/Precautions  Restrictions/Precautions: Weight Bearing  Lower Extremity Weight Bearing Restrictions  Right Lower Extremity Weight Bearing: Weight Bearing As Tolerated     Subjective   General  Chart Reviewed: Yes  Patient assessed for rehabilitation services?: Yes         Social/Functional History  Social/Functional History  Lives With: Spouse  Type of Home: House  Home Layout: Multi-level (7-8 steps between floors)  Home Access: Level entry  ADL Assistance: Independent  Homemaking Assistance: Independent  Ambulation Assistance: Independent  Transfer Assistance: Independent  Active : Yes  Mode of Transportation: Car  Occupation: Full time employment  Type of Occupation:   Vision/Hearing  Vision  Vision: Within [address]  Hearing: Within functional limits    Cognition   Orientation  Overall Orientation Status: Within Normal Limits  Cognition  Overall Cognitive Status: WNL     Objective   Heart Rate: 66  Heart Rate Source: Monitor  BP: 114/77  BP Location: Left upper arm  BP Method: Automatic  Patient Position: Semi fowlers  MAP (Calculated): 89.33  Resp: 16  SpO2: 97 %  O2 Device: None (Room air)     Observation/Palpation  Observation: in bed  IV        AROM RLE (degrees)  RLE General AROM: knee FLEX 75* iin sitting- EXT -10* in supine  Strength RLE  Comment: I SLR- fair quad set  Strength LLE  Strength LLE: WFL           Bed mobility  Supine to Sit: Contact guard assistance  Sit to Supine: Contact guard assistance  Transfers  Sit to Stand: Stand by assistance  Stand to Sit: Stand by assistance  Ambulation  Surface: Level tile  Device: Rolling Walker  Assistance: Contact guard assistance  Gait Deviations: None  Distance: in room x20 ft                 OutComes Score                                                  AM-PAC Score       Basic Mobility Six Clicks Form [address] \[facility]\"" [address] [address] AM-PAC Score Conversion Table   How much difficulty does the patient currently have Unable   (pt is unable to do activity) A Lot   (activity is a struggle, requires great effort/time) A Little   (pt can manage, but takes more effort/time than should) None   (pt has no difficulty) Raw Score Standardized Score CMS -100% Score CMS Modifier        6 23.55 100% CN   Turning over in bed (including adjusting bedclothes, sheets, and blankets)?   []1 []2  [x]3  []4  7 26.42 92.36% CM        8 28.58 86.62% CM   Sitting down on and standing up from a chair with arms (e.g. wheelchair, bedside commode, etc.)? []1 []2 [x]3   []4   9 30.55 81.38% CM        10 32.29 76.75% CL   Moving from lying on back to sitting on the side of the bed? []1 []2  [x]3   []4   11 33.86 72.57% CL        12 35.33 68.66% CL   How much help from another person does the patient currently need Total   (Total/Dependent Assist) A Lot   (Max/Mod Assist) A Little   (Min/CGA/Supervision) None   (No human assistance) 13 36.74 64.91% CL        14 38.1 61.29% CL   Moving to and from a bed to a chair (including a wheelchair)? []1  []2   [x]3  []4   15 39.45 57.70% CK        16 40.78 54.16% CK   To walk in a hospital room? []1 []2   [x]3    []4  17 42.13 50.57% CK        18 43.63 46.58% CK   Climbing 3-5 steps with a railing?  []1  []2   [x]3    []4  19 45.44 41.77% CK        20 47.67 35.83% CJ   Raw Score 18  21 50.25 28.97% CJ   Standardized Score   22 53.28 20.91% CJ   CMS 0-100% Score   23 56.93 11.20% CI   CMS Modifier  24 61.14 0.00% CH     CH = 0% impaired  CI = 1-20% impaired  CJ = 20-40% impaired  CK = 40-60% impaired  CL = 60-80% impaired  CM = % impaired  CN = 100% impaired           Tinneti Score       Goals  Short Term Goals  Time Frame for Short Term Goals: 1 week  Short Term Goal 1: patient will asend/descend 1 step 6\" in // bars with mod I       Education         Therapy Time   Individual Concurrent Group Co-treatment   Time In 1740         Time Out 1800         Minutes 20                 J Bernardo Payne PT

## 2022-10-04 NOTE — CARE COORDINATION
CM met with the patient and his wife to initiate discharge planning. Patient lives in a tri-level home with his wife (no steps to enter, able to stay on main level), has insurance with Rx coverage & PCP, is independent with ADL's, is employed, and is able to drive. Patient did not require the use of any assistive devices or home oxygen prior to admission. Patient stated that he has a walker available for use at home. Patient plans to be admitted following surgery and is hopeful to return home Wednesday. Patient plans to follow-up with Kash Acosta following discharge and already has an appointment scheduled Friday 10/7/22 at 2:30 PM.  Patient is unable to identify any needs at this time. CM available if needs arise.

## 2022-10-04 NOTE — BRIEF OP NOTE
Brief Postoperative Note      Patient: Rina Tate  YOB: 1970  MRN: [id number]    Date of Procedure: 10/4/2022    Pre-Op Diagnosis: Primary osteoarthritis of right knee [M17.11]  Right knee pain, unspecified chronicity [M25.561]    Post-Op Diagnosis: Same       Procedure(s):  RIGHT KNEE TOTAL ARTHROPLASTY    Surgeon(s):  Joshua Power DO    Assistant:  * No surgical staff found *    Anesthesia: Spinal    Estimated Blood Loss (mL): less than 359     Complications: None    Specimens:   * No specimens in log *    Implants:  Implant Name Type Inv.  Item Serial No.  Lot No. LRB No. Used Action   PSN FEM CR POR CCR STD SZ11 R - [id number]  PSN FEM CR POR CCR STD SZ11 R  [facility] [id number] Right 1 Implanted   COMPONENT PAT [id number] STD TI POLY TRABECULAR MTL - [id number]  COMPONENT PAT [id number] STD TI POLY TRABECULAR MTL  [facility] [id number] Right 1 Implanted   PSN TIB POR 2 PEG SZ G R - [id number]  PSN TIB POR 2 PEG SZ G R  [facility] [id number] Right 1 Implanted   PSN MC VE ASF R 10MM 8-11 17[facility] [id number]  PSN MC VE ASF R 10MM 8-11 1720 [facility] [id number] Right 1 Implanted         Drains: * No LDAs found *    Findings: Right knee OA    Electronically signed by Rachid Handley DO on 10/4/2022 at 1:09 PM

## 2022-10-04 NOTE — ANESTHESIA PROCEDURE NOTES
Peripheral Block    Patient location during procedure: procedure area  Reason for block: procedure for pain and at surgeon's request  Start time: 10/4/2022 11:00 AM  End time: 10/4/2022 11:05 AM  Staffing  Performed: resident/CRNA   Resident/CRNA: RHETT Gresham CRNA  Preanesthetic Checklist  Completed: patient identified, IV checked, site marked, risks and benefits discussed, surgical/procedural consents, equipment checked, pre-op evaluation, timeout performed, anesthesia consent given, oxygen available, monitors applied/VS acknowledged, fire risk safety assessment completed and verbalized and blood product R/B/A discussed and consented  Peripheral Block   Patient position: supine  Prep: Betadine  Provider prep: mask and sterile gloves  Patient monitoring: cardiac monitor, continuous pulse ox, frequent blood pressure checks, IV access, oxygen and responsive to questions  Block type:  Anterior knee  Laterality: right  Injection technique: single-shot  Guidance: paresthesia technique  Local infiltration: lidocaine  Infiltration strength: 2 %  Local infiltration: lidocaine  Dose: 2 mL    Needle   Needle type: insulated echogenic nerve stimulator needle   Needle gauge: 20 G  Needle localization: ultrasound guidance  Needle length: 10 cm  Assessment   Injection assessment: negative aspiration for heme, no paresthesia on injection, local visualized surrounding nerve on ultrasound and no intravascular symptoms  Paresthesia pain: none  Slow fractionated injection: yes  Hemodynamics: stable  Real-time US image taken/store: yes  Outcomes: uncomplicated and patient tolerated procedure well    Medications Administered  ropivacaine (NAROPIN) injection 0.5% - Perineural   20 mL - 10/4/2022 11:00:00 AM

## 2022-10-04 NOTE — PROGRESS NOTES
4 Eyes Skin Assessment     NAME:  Jose Zimmer  YOB: 1970  MEDICAL RECORD NUMBER:  [id number]    The patient is being assess for  Admission    I agree that 2 RN's have performed a thorough Head to Toe Skin Assessment on the patient. ALL assessment sites listed below have been assessed. Areas assessed by both nurses:    Head, Face, Ears, Shoulders, Back, Chest, Arms, Elbows, Hands, Sacrum. Buttock, Coccyx, Ischium, and Legs. Feet and Heels        Does the Patient have a Wound?  No noted wound(s)       Damián Prevention initiated:  No   Wound Care Orders initiated:  No    Pressure Injury (Stage 3,4, Unstageable, DTI, NWPT, and Complex wounds) if present place referral/consult order under [de-identified] No    New and Established Ostomies if present place consult order under : No      Nurse 1 eSignature: Electronically signed by Marii Lopez RN on 10/4/22 at 7:37 PM EDT    **SHARE this note so that the co-signing nurse is able to place an eSignature**    Nurse 2 eSignature: Nery Grover RN

## 2022-10-04 NOTE — PROGRESS NOTES
Jenny Ramos is a 46 y.o. male patient. 1. Preop testing      Past Medical History:   Diagnosis Date    Cerebral artery occlusion with cerebral infarction ([facility] [address]     \"had stroke 3/30/2020- had subarachnoid hemorrhage- dont remember my symtoms - all resolved now\"    Diabetes mellitus ([facility] [address]     dx 2020    History of short term memory loss     \"this has gotten better\"    Hyperlipidemia     Personal history of other medical treatment     per pt \"my wife and daughter have malignant hyperthermia\"    Subarachnoid hemorrhage ([facility] [address] 03/2020    Wears glasses     to read     No past surgical history pertinent negatives on file. Scheduled Meds:   sodium chloride flush  5-40 mL IntraVENous 2 times per day    acetaminophen  650 mg Oral Q6H    ceFAZolin (ANCEF) IVPB  2,000 mg IntraVENous Q8H    aspirin  325 mg Oral BID    atorvastatin  80 mg Oral Nightly    [START ON 10/5/2022] buPROPion  300 mg Oral QAM    fenofibrate  160 mg Oral Daily    [START ON 10/5/2022] Empagliflozin-linaGLIPtin  1 tablet Oral Daily    metFORMIN  500 mg Oral Dinner     Continuous Infusions:   lactated ringers 125 mL/hr at 10/04/22 1522    sodium chloride      dextrose       PRN Meds:sodium chloride flush, sodium chloride, ketorolac, oxyCODONE **OR** oxyCODONE, HYDROmorphone, glucose, dextrose bolus **OR** dextrose bolus, glucagon (rDNA), dextrose    No Known Allergies  Principal Problem:    History of total right knee replacement  Resolved Problems:    * No resolved hospital problems. *    Blood pressure 114/77, pulse 66, temperature 97.6 °F (36.4 °C), temperature source Oral, resp. rate 16, height 5' 10\" (1.778 m), weight 234 lb 14.4 oz (106.5 kg), SpO2 97 %. Subjective  Patient seen and examined, resting in bed comfortably, pain controlled, no new complaints. Nerve block still functioning. Objective  RLE - Dressing clean, dry, intact, no calf TTP, compartments soft, neurovascularly intact distally.     Assessment & Plan  POD #0 R TKA, Doing well postoperatively    Continue weight bearing as tolerated. Continue range of motion exercises. Pain control. DVT prophylaxis. Continue to ice and elevate. Continue PT/OT. Discharge planning for home tomorrow.     Sebastian Parisi, DO  10/4/2022

## 2022-10-04 NOTE — OP NOTE
DATE OF PROCEDURE: 10/4/2022    PREOPERATIVE DIAGNOSIS:  Right knee DJD. POSTOPERATIVE DIAGNOSIS:  Right knee DJD. PROCEDURE:  Right total knee arthroplasty using [employer] Press-fit MC knee with size 11 femoral component, size G tibial component, size 35 patellar component and size 10 tibial poly component. SURGEON:  Cem Cazares DO    FIRST ASSISTANT: HARSHAD Smith    ANESTHESIA:  Spinal with regional block. ESTIMATED BLOOD LOSS:  100 mL. TOTAL TOURNIQUET TIME: 54 inutes. FLUIDS: 1200 mL of crystalloids. INDICATIONS FOR PROCEDURE:  The patient is a 60-year-old male with  long-standing history of right knee pain. For this, he underwent  conservative treatment with no relief of his symptoms. X-rays revealed  severe arthritis in the right knee. Given his persistent symptoms  despite conservative treatment and with his x-ray findings, I  recommended surgical treatment. I explained the risks, benefits and  possible complications of the procedure to the patient and after  answering all of his questions, he consented to undergo the above  procedure. REPORT OF PROCEDURE:  The patient was seen and evaluated in the  preoperative holding area where the right lower extremity was signed in  his presence. At this point, care of the patient was turned over to  anesthesia team who performed a regional block to the right lower  extremity. He was then transported back to the operative suite. Spinal  anesthesia was performed and once adequate anesthesia was obtained, the  right lower extremity was prepped and draped in usual sterile fashion. Preoperative antibiotics were administered. At this point, a time-out was performed and all in attendance were  in agreement. I exsanguinated the right lower extremity with the use of an Esmarch and  tourniquet was inflated to 250 mmHg.   I then made a standard anterior  midline incision overlying the right knee and carried sharp dissection  down into the knee joint through a medial parapatellar incision. I then  debrided a significant amount of intra-articular fat pad and  inflammatory synovium. I then elevated the soft tissue off the proximal  and medial tibia with the use of Bovie. I then everted the patella and  flexed the knee at 90 degrees. I then debrided soft tissue around the  patella. I then used a saw to perform a freehand cut of the patella and  the bone fragment was then elevated and discarded. I then aligned a  sizing guide and found this to be a size 35 patella. I then drilled the  three drill holes. I then aligned a size 35 patella trial component,  which fit nicely on the patella. Additional osteophytes were then  debrided around the patellar component. The trial component was then  removed. I then turned my attention for preparation of the distal femur. I  placed retractors along the medial and lateral aspect of the distal  femur to protect the soft tissue. I then inserted the intramedullary  drill into the femoral canal.  I then inserted the intramedullary distal  femoral cutting guide, which was malleted in place and pinned in place. I then used a saw to perform the distal femoral cut resecting a total of 16 mm of  distal femoral bone. The bone fragments were then discarded. The  distal femoral cutting guide was then removed. I then aligned the  sizing guide over the distal femur and found this to be a size 11 distal  femoral component. I then drilled guide holes through the sizing guide,  which was then removed. I then inserted the size 11 distal femoral  cutting block, which was malleted in place and pinned in place. I then  used a saw to perform the anterior, posterior and chamfer cuts and the  bone fragments were then elevated and discarded. The distal femoral  cutting guide was then removed. I then debrided the ACL and PCL out of  the femoral notch.     I then turned my attention for preparation of the proximal tibia. I  placed a retractor along the posterior tibia, translating it anteriorly  as well as retractors along the medial and lateral aspect of the  proximal tibia to protect the soft tissue. I then removed the remnant  of the medial and lateral meniscus ensuring to protect the popliteal  tendon and medial collateral ligament. Once I had adequate exposure of  the proximal tibia, I aligned the proximal tibial cutting guide and once  the appropriate position was obtained, it was pinned in place. I then  used a saw to perform the proximal tibial cut and the bone fragment was  then elevated and discarded. I then placed a lamina  in the  lateral compartment of the knee and used a curved osteotome and curved  curette to remove small-sized osteophytes off the posterior aspect of  the medial femoral condyle. I then placed a lamina  in the  medial compartment of the knee and used a curved osteotome and curved  curette to remove small osteophytes off the posterior aspect of the  lateral femoral condyle. I then translated the proximal tibia  anteriorly again. I then aligned a size G tibial base plate and once  the appropriate position was obtained, it was pinned in place. I then  inserted a size 11 distal femoral trial component, which was malleted in  place until it was firmly seated. I then inserted a trial size 10 tibial poly component and a  size 35 patellar component. With all trial components in place, I ran  the knee through full range of motion and found there to be excellent  tracking of the patella with no laxity to varus or valgus stressing. These were then selected for the final implant components. Given the excellent bone quality and his age I decided to proceed with press-fit components. The trial components were then removed leaving the tibial base plate in  place.   I then drilled and punched the 2 pegs for the proximal tibia and  the tibial base plate was then removed. The  bone ends were then irrigated with pulse lavage using sterile normal  saline with gentamicin, the bone ends were then dried. The final size G press-fit tibial component was then malleted in place until it was firmly seated. The final size 11 press-fit distal femoral component was then  malleted in place until it was firmly seated. I then everted the patella,  which was irrigated with pulse lavage and then dried. I them inserted the final size 35 patellar component which was then clamped in place until it was firmly seated. The tourniquet was then deflated for a total of 54 minutes and adequate hemostasis was maintained. The joint was then irrigated further with pulse lavage. I  then inserted the final size 10 MC tibial poly component, which was locked  in place. With all final components in place, I ran the knee through a  full range of motion and found there to be excellent tracking of the  patella with no laxity to varus or valgus stressing. The joint was then irrigated further with pulse lavage. I then re-approximated the medial  parapatellar incision using #5 Ethibond suture and #1 Vicryl suture in  figure-of-eight fashion. I then closed subcutaneous tissue using 2-0  Vicryl suture followed by skin closure with stratafix and prineo. Adequate  hemostasis was maintained at all times. I then applied a sterile soft  dressing to the right lower extremity. The patient was then awakened  from anesthesia and transported to PACU in stable condition. He  appeared to have tolerated the procedure well. PROGNOSIS:  At this point, he will be admitted to the hospital for  postoperative pain control, rehabilitation and medical monitoring. Hospitalist will be consulted for medical management and physical  therapy will be consulted for gait training and he could be  weightbearing as tolerated on the right leg.   He will receive antibiotic  prophylaxis and DVT prophylaxis during his hospitalization. Following  his discharge, I will see him back in the office in 3 weeks and will continue to monitor his progress in the outpatient setting for resolution of his symptoms. Alyssa Islas was present for the entirety of the procedure and vital for the performance of the procedure. Alyssa Islas assisted with positioning, prepping, draping of the patient before the procedure and instrument manipulation, extremity repositioning and camera operation during the procedure as well as wound closure, dressing application and brace application after the procedure. Please note that no intern, resident, or other hospital staff was available to assist during the surgery.       Sebastian 97, DO

## 2022-10-04 NOTE — H&P
Subjective:      Patient ID: David Wilkinson is a 46 y.o. male. Patient seen in office today for FU R knee MRI results 8/10/22. Stated 6/10 pain level today. No new complaints/concerns. X-ray taken today. Impression  Severe lateral compartment cartilage loss and multifocal subchondral edema,  significantly progressed since the previous exam.     Suspected complex tear or re-tear of the lateral meniscus with lateral  meniscal body extrusion. Large joint effusion. Mild synovitis. He comes in today for evaluation of his right knee pain. He states that since his right knee scope and ganglion cyst excision about 2 years ago he was doing well but over the past year or so he has been dealing with progressively worsening pain in the right knee again. He states that now the more activity as he is having worsening deep, aching and throbbing pain as well as swelling globally in his right knee primarily along the anterior lateral aspect. Patient denies any new injury to the involved extremity/ joint, denies numbness or tingling in the involved extremity and denies fever or chills. Review of Systems   Constitutional:  Negative for activity change, chills and fever. HENT:  Negative for congestion and drooling. Eyes:  Negative for redness. Respiratory:  Negative for chest tightness. Cardiovascular:  Negative for chest pain. Gastrointestinal:  Negative for abdominal pain. Endocrine: Negative for cold intolerance and heat intolerance. Musculoskeletal:  Positive for arthralgias, gait problem, joint swelling and myalgias. Negative for back pain. Skin:  Negative for color change, pallor, rash and wound. Neurological:  Negative for weakness and numbness. Psychiatric/Behavioral:  Negative for confusion.        Past Medical History        Past Medical History:   Diagnosis Date    Cerebral artery occlusion with cerebral infarction ([facility]       \"had stroke 3/30/2020- had subarachnoid hemorrhage- dont remember my symtoms - all resolved now\"    Diabetes mellitus ([address]       dx 2020    History of short term memory loss       \"this has gotten better\"    Hyperlipidemia      Personal history of other medical treatment       per pt \"my wife and daughter have malignant hyperthermia\"    Subarachnoid hemorrhage ([address] 03/2020    Wears glasses       to read            Past Medical History:   Diagnosis Date    Cerebral artery occlusion with cerebral infarction ([address]     \"had stroke 3/30/2020- had subarachnoid hemorrhage- dont remember my symtoms - all resolved now\"    Diabetes mellitus ([address]     dx 2020    History of short term memory loss     \"this has gotten better\"    Hyperlipidemia     Personal history of other medical treatment     per pt \"my wife and daughter have malignant hyperthermia\"    Subarachnoid hemorrhage ([address] 03/2020    Wears glasses     to read       No current facility-administered medications on file prior to encounter.      Current Outpatient Medications on File Prior to Encounter   Medication Sig Dispense Refill    buPROPion (WELLBUTRIN XL) 300 MG extended release tablet Take 300 mg by mouth every morning      fenofibrate (TRICOR) 48 MG tablet Take 144 mg by mouth daily      GLYXAMBI 10-5 MG TABS TAKE 1 TABLET BY ORAL ROUTE ONCE DAILY IN THE MORNING FOR 30 DAYS      FREESTYLE LITE strip TEST TWICE A DAY      FreeStyle Lancets MISC TEST TWICE A DAY      metFORMIN (GLUCOPHAGE-XR) 500 MG extended release tablet TAKE 1 TABLET BY MOUTH EVERY DAY WITH EVENING MEAL      atorvastatin (LIPITOR) 80 MG tablet TAKE 1 TABLET BY MOUTH EVERY DAY       No Known Allergies  Past Surgical History:   Procedure Laterality Date    KNEE ARTHROSCOPY Right 10/1/2020    RIGHT KNEE ARTHROSCOPY , LATERAL MENISCECTOMY , LATERAL OPEN , CYST EXCISION performed by Juanjo Piña DO at [address]  1970's    OTHER SURGICAL HISTORY  03/2020    per old chart at [facility] had diagnostic angiogram for the [facility]- 3/2020, 2020 and 2020    TONSILLECTOMY  1978    VASECTOMY  2010     Social History     Tobacco Use    Smoking status: Every Day     Packs/day: 0.50     Years: 25.00     Pack years: 12.50     Types: Cigarettes     Last attempt to quit: 3/30/2020     Years since quittin.5    Smokeless tobacco: Never   Vaping Use    Vaping Use: Never used   Substance Use Topics    Alcohol use: No    Drug use: No     Family History   Problem Relation Age of Onset    Cancer Mother         non hodgkins lymphoma    Stroke Father        Objective:   Physical Exam  Constitutional:       Appearance: He is well-developed. HENT:      Head: Normocephalic. Nose: No congestion. Eyes:      Pupils: Pupils are equal, round, and reactive to light. Pulmonary:      Effort: Pulmonary effort is normal.   Musculoskeletal:         General: Swelling and tenderness present. No deformity. Normal range of motion. Cervical back: Normal range of motion. Right hip: Normal.      Left hip: Normal.      Right knee: Swelling, bony tenderness and crepitus present. No deformity, effusion, erythema, ecchymosis or lacerations. Normal range of motion. Tenderness present over the lateral joint line and patellar tendon. No medial joint line, MCL or LCL tenderness. No LCL laxity or MCL laxity. Normal alignment and normal patellar mobility. Left knee: Normal. No swelling, deformity, effusion, erythema, ecchymosis, lacerations or bony tenderness. Normal range of motion. No tenderness. No medial joint line, lateral joint line, MCL, LCL or patellar tendon tenderness. No LCL laxity or MCL laxity. Normal alignment and normal patellar mobility. Skin:     General: Skin is warm and dry. Capillary Refill: Capillary refill takes less than 2 seconds. Coloration: Skin is not pale. Findings: No erythema or rash. Neurological:      Mental Status: He is alert and oriented to person, place, and time. Sensory: No sensory deficit. Motor: No weakness. Right knee-well-healed surgical scars, no redness, no signs of infection. Moderate knee effusion  Mild valgus deformity with solid endpoint of the MCL.  0-140     XR KNEE RIGHT (3 VIEWS)     Result Date: 8/24/2022  Darrell Escobar X-ray 3 views of the right knee obtained and reviewed by me today in the office demonstrates age appropriate bone density throughout with moderate to severe degenerative change of the right knee which has worsened compared to prior x-rays from August 2021 with approximately 75% lateral patellofemoral joint space narrowing, 50% medial joint space narrowing, early osteophyte formation in the lateral patellofemoral compartments with normal tracking the patella, no acute osseous abnormalities. Impression: Moderate to severe degenerative change of the right knee as above with no acute process. BP (!) 134/90   Pulse 78   Temp 98.2 °F (36.8 °C) (Temporal)   Resp 18   Ht 5' 10\" (1.778 m)   Wt 205 lb (93 kg)   SpO2 94%   BMI 29.41 kg/m²     Assessment:   Right knee OA, moderate to severe  Right knee lateral meniscus tear                Plan:   I discussed with him today his x-ray and MRI findings. I explained to him that he does have a new tear in his lateral meniscus of his right knee and the arthritis in the right knee has progressively worsened. I explained to him that we could either proceed with right knee arthroscopy which may or may not help with his symptoms or could consider knee replacement surgery. I did discuss with him today performing right knee arthroscopy with partial lateral meniscectomy and chondroplasty. I explained to him that if this does not help we could then proceed with Orthovisc injections. I also had a lengthy discussion with him today in regards to right total knee replacement surgery. I explained risks, benefits, possible complications of the procedure and answered all questions for the patient.     I explained postoperative rehabilitation protocol and expectations with the patient today. The patient understands and consents to the procedure. Patient will follow up with their primary care physician prior to surgical treatment for preoperative clearance. We will schedule surgery at soonest convenience. Continue weight-bearing as tolerated. Continue range of motion exercises as instructed. Ice and elevate as needed. Tylenol or Motrin for pain. Follow up in office in either 3 weeks postop or 2 weeks postop depending on which type of surgery he would like to proceed with. He like to have a surgery at [facility] and if he proceed with knee replacement surgery he would like to do this as an outpatient and will be a press-fit candidate. He will contact the office once he decides which way he would like to proceed. The patient was counseled at length about the risks of maggie Covid-19 during their perioperative period and any recovery window from their procedure. The patient was made aware that maggie Covid-19  may worsen their prognosis for recovering from their procedure  and lend to a higher morbidity and/or mortality risk. All material risks, benefits, and reasonable alternatives including postponing the procedure were discussed. The patient does wish to proceed with the procedure at this time. Pt seen and examined, No change in H+P. Since I saw him last, he discussed with his wife and he would like to proceed with knee replacement surgery and he would like to spend the night in the hospital after surgery.                      Sebastian Parisi, DO

## 2022-10-05 VITALS
OXYGEN SATURATION: 96 % | TEMPERATURE: 97.5 F | SYSTOLIC BLOOD PRESSURE: 124 MMHG | HEIGHT: 70 IN | HEART RATE: 93 BPM | BODY MASS INDEX: 33.63 KG/M2 | RESPIRATION RATE: 16 BRPM | WEIGHT: 234.9 LBS | DIASTOLIC BLOOD PRESSURE: 76 MMHG

## 2022-10-05 PROBLEM — M17.11 PRIMARY OSTEOARTHRITIS OF RIGHT KNEE: Status: ACTIVE | Noted: 2022-10-05

## 2022-10-05 LAB
ANION GAP SERPL CALCULATED.3IONS-SCNC: 10 MMOL/L (ref 4–16)
BASOPHILS ABSOLUTE: 0 K/CU MM
BASOPHILS RELATIVE PERCENT: 0.2 % (ref 0–1)
BUN BLDV-MCNC: 13 MG/DL (ref 6–23)
CALCIUM SERPL-MCNC: 9.3 MG/DL (ref 8.3–10.6)
CHLORIDE BLD-SCNC: 103 MMOL/L (ref 99–110)
CO2: 28 MMOL/L (ref 21–32)
CREAT SERPL-MCNC: 0.7 MG/DL (ref 0.9–1.3)
DIFFERENTIAL TYPE: ABNORMAL
EOSINOPHILS ABSOLUTE: 0 K/CU MM
EOSINOPHILS RELATIVE PERCENT: 0.1 % (ref 0–3)
GFR AFRICAN AMERICAN: >60 ML/MIN/1.73M2
GFR NON-AFRICAN AMERICAN: >60 ML/MIN/1.73M2
GLUCOSE BLD-MCNC: 161 MG/DL (ref 70–99)
GLUCOSE BLD-MCNC: 167 MG/DL (ref 70–99)
HCT VFR BLD CALC: 47.7 % (ref 42–52)
HEMOGLOBIN: 15.6 GM/DL (ref 13.5–18)
IMMATURE NEUTROPHIL %: 0.3 % (ref 0–0.43)
LYMPHOCYTES ABSOLUTE: 1.1 K/CU MM
LYMPHOCYTES RELATIVE PERCENT: 6.3 % (ref 24–44)
MCH RBC QN AUTO: 27.7 PG (ref 27–31)
MCHC RBC AUTO-ENTMCNC: 32.7 % (ref 32–36)
MCV RBC AUTO: 84.6 FL (ref 78–100)
MONOCYTES ABSOLUTE: 1.6 K/CU MM
MONOCYTES RELATIVE PERCENT: 9.3 % (ref 0–4)
PDW BLD-RTO: 14.6 % (ref 11.7–14.9)
PLATELET # BLD: 279 K/CU MM (ref 140–440)
PMV BLD AUTO: 10.3 FL (ref 7.5–11.1)
POTASSIUM SERPL-SCNC: 4.5 MMOL/L (ref 3.5–5.1)
RBC # BLD: 5.64 M/CU MM (ref 4.6–6.2)
SEGMENTED NEUTROPHILS ABSOLUTE COUNT: 14.5 K/CU MM
SEGMENTED NEUTROPHILS RELATIVE PERCENT: 83.8 % (ref 36–66)
SODIUM BLD-SCNC: 141 MMOL/L (ref 135–145)
TOTAL IMMATURE NEUTOROPHIL: 0.05 K/CU MM
WBC # BLD: 17.2 K/CU MM (ref 4–10.5)

## 2022-10-05 PROCEDURE — 6370000000 HC RX 637 (ALT 250 FOR IP): Performed by: ORTHOPAEDIC SURGERY

## 2022-10-05 PROCEDURE — 6360000002 HC RX W HCPCS: Performed by: ORTHOPAEDIC SURGERY

## 2022-10-05 PROCEDURE — 97530 THERAPEUTIC ACTIVITIES: CPT

## 2022-10-05 PROCEDURE — 97166 OT EVAL MOD COMPLEX 45 MIN: CPT

## 2022-10-05 PROCEDURE — 97116 GAIT TRAINING THERAPY: CPT

## 2022-10-05 PROCEDURE — 94761 N-INVAS EAR/PLS OXIMETRY MLT: CPT

## 2022-10-05 PROCEDURE — 82962 GLUCOSE BLOOD TEST: CPT

## 2022-10-05 PROCEDURE — 80048 BASIC METABOLIC PNL TOTAL CA: CPT

## 2022-10-05 PROCEDURE — 6370000000 HC RX 637 (ALT 250 FOR IP): Performed by: NURSE PRACTITIONER

## 2022-10-05 PROCEDURE — 85025 COMPLETE CBC W/AUTO DIFF WBC: CPT

## 2022-10-05 PROCEDURE — 2580000003 HC RX 258: Performed by: ORTHOPAEDIC SURGERY

## 2022-10-05 RX ORDER — OXYCODONE HYDROCHLORIDE 5 MG/1
5 TABLET ORAL EVERY 6 HOURS PRN
Qty: 25 TABLET | Refills: 0 | Status: SHIPPED | OUTPATIENT
Start: 2022-10-05 | End: 2022-10-12

## 2022-10-05 RX ADMIN — ACETAMINOPHEN 650 MG: 325 TABLET ORAL at 03:01

## 2022-10-05 RX ADMIN — OXYCODONE HYDROCHLORIDE 10 MG: 10 TABLET ORAL at 10:47

## 2022-10-05 RX ADMIN — SODIUM CHLORIDE: 9 INJECTION, SOLUTION INTRAVENOUS at 02:56

## 2022-10-05 RX ADMIN — ASPIRIN 325 MG: 325 TABLET, COATED ORAL at 08:40

## 2022-10-05 RX ADMIN — ACETAMINOPHEN 650 MG: 325 TABLET ORAL at 08:40

## 2022-10-05 RX ADMIN — CEFAZOLIN 2000 MG: 1 INJECTION, POWDER, FOR SOLUTION INTRAMUSCULAR; INTRAVENOUS at 03:01

## 2022-10-05 RX ADMIN — SODIUM CHLORIDE, PRESERVATIVE FREE 10 ML: 5 INJECTION INTRAVENOUS at 08:42

## 2022-10-05 RX ADMIN — FENOFIBRATE 160 MG: 160 TABLET ORAL at 08:41

## 2022-10-05 RX ADMIN — SODIUM CHLORIDE, POTASSIUM CHLORIDE, SODIUM LACTATE AND CALCIUM CHLORIDE: 600; 310; 30; 20 INJECTION, SOLUTION INTRAVENOUS at 07:02

## 2022-10-05 RX ADMIN — BUPROPION HYDROCHLORIDE 300 MG: 150 TABLET, EXTENDED RELEASE ORAL at 08:41

## 2022-10-05 ASSESSMENT — PAIN - FUNCTIONAL ASSESSMENT: PAIN_FUNCTIONAL_ASSESSMENT: ACTIVITIES ARE NOT PREVENTED

## 2022-10-05 ASSESSMENT — PAIN DESCRIPTION - ONSET: ONSET: GRADUAL

## 2022-10-05 ASSESSMENT — PAIN DESCRIPTION - ORIENTATION
ORIENTATION: RIGHT
ORIENTATION: RIGHT

## 2022-10-05 ASSESSMENT — PAIN SCALES - GENERAL
PAINLEVEL_OUTOF10: 4
PAINLEVEL_OUTOF10: 3
PAINLEVEL_OUTOF10: 8
PAINLEVEL_OUTOF10: 5

## 2022-10-05 ASSESSMENT — PAIN DESCRIPTION - LOCATION
LOCATION: KNEE
LOCATION: KNEE

## 2022-10-05 ASSESSMENT — PAIN DESCRIPTION - FREQUENCY: FREQUENCY: CONTINUOUS

## 2022-10-05 ASSESSMENT — PAIN DESCRIPTION - DESCRIPTORS
DESCRIPTORS: ACHING
DESCRIPTORS: ACHING

## 2022-10-05 ASSESSMENT — PAIN DESCRIPTION - PAIN TYPE: TYPE: SURGICAL PAIN

## 2022-10-05 ASSESSMENT — ENCOUNTER SYMPTOMS
GASTROINTESTINAL NEGATIVE: 1
EYES NEGATIVE: 1
ALLERGIC/IMMUNOLOGIC NEGATIVE: 1
RESPIRATORY NEGATIVE: 1

## 2022-10-05 ASSESSMENT — PAIN DESCRIPTION - DIRECTION: RADIATING_TOWARDS: NO

## 2022-10-05 NOTE — PROGRESS NOTES
Discharge instructions given to patient and wife, all questions answered, verbalized understanding, patient taken to car with all belongings to be driven home by wife.

## 2022-10-05 NOTE — PROGRESS NOTES
V2.0  [facility] Hospitalist Progress Note      Name:  Urbano Spence /Age/Sex: 1970  (46 y.o. male)   MRN & CSN:  [id number] & [id number] Encounter Date/Time: 10/5/2022 8:40 AM EDT    Location:  [address] [place] [address] PCP: Manju Harris MD       Hospital Day: 2    Assessment and Plan:   Urbano Spence is a 46 y.o. male with pmh of DM, HTN, HLD who presents with History of total right knee replacement 10/4 for right knee OA      Plan:  Right knee osteoarthritis: S/p right total knee replacement 10/4/2022 per orthopedic surgery. Oral/IV analgesics as needed for pain. PT/OT.  twice daily for DVT prophylaxis. Leukocytosis: wbc 17K 10/5. Suspect reactive from surgery. No fever, dysuria, SOB, purulent drainage or other infectious symptoms. No anti-microbial therapy warranted. Repeat CBC in 1 week per PCP  NIDDM 2: Per history, stable glycemic control. Continue home oral regimen. Essential hypertension: Per history, BP stable. Not currently on any medications and will add as needed. Dyslipidemia: Per history, continue fenofibrate and statin. History of depression: continue Wellbutrin  Obesity: BMI 33.70, lifestyle modifications as able    Diet ADULT DIET; Regular; 5 carb choices (75 gm/meal)   DVT Prophylaxis [] Lovenox, []  Heparin, [] SCDs, [] Ambulation,  [] Eliquis, [] Xarelto  [] Coumadin Aspirin 325 BID   Code Status Prior   Disposition From: home  Expected Disposition: home  Estimated Date of Discharge: 10/5  Patient requires continued admission due to pain control, therapy, post op   Surrogate Decision Maker/ POA spouse     Subjective:     Chief Complaint: No chief complaint on file. Urbano Spence is a 46 y.o. male who presents with R knee OA s/p TKA 10/4. Pain controlled. Moving around some. No bleeding, fever, chest pain, SOB. Review of Systems:    Review of Systems   Constitutional: Negative. HENT: Negative. Eyes: Negative. Respiratory: Negative.      Cardiovascular: Negative. Gastrointestinal: Negative. Endocrine: Negative. Genitourinary: Negative. Musculoskeletal:  Positive for arthralgias. Allergic/Immunologic: Negative. Neurological: Negative. Hematological: Negative. Psychiatric/Behavioral: Negative. Objective: Intake/Output Summary (Last 24 hours) at 10/5/2022 0840  Last data filed at 10/5/2022 0839  Gross per 24 hour   Intake 3772.64 ml   Output 1650 ml   Net 2122.64 ml        Vitals:   Vitals:    10/05/22 0800   BP: 124/76   Pulse: 93   Resp: 16   Temp: 97.5 °F (36.4 °C)   SpO2: 96%       Physical Exam:     General: NAD  Eyes: EOMI  ENT: neck supple  Cardiovascular: Regular rate. Respiratory: Clear to auscultation BL on RA  Gastrointestinal: Soft, non tender  Genitourinary: no suprapubic tenderness  Musculoskeletal: No edema, right knee wrapped  Skin: warm, dry  Neuro: Alert. Psych: Mood appropriate.      Medications:   Medications:    sodium chloride flush  5-40 mL IntraVENous 2 times per day    acetaminophen  650 mg Oral Q6H    aspirin  325 mg Oral BID    atorvastatin  80 mg Oral Nightly    buPROPion  300 mg Oral QAM    fenofibrate  160 mg Oral Daily    Empagliflozin-linaGLIPtin  1 tablet Oral Daily    metFORMIN  500 mg Oral Dinner      Infusions:    sodium chloride Stopped (10/05/22 0336)    dextrose       PRN Meds: sodium chloride flush, 5-40 mL, PRN  sodium chloride, , PRN  ketorolac, 15 mg, Q6H PRN  oxyCODONE, 5 mg, Q4H PRN   Or  oxyCODONE, 10 mg, Q4H PRN  HYDROmorphone, 0.5 mg, Q3H PRN  glucose, 4 tablet, PRN  dextrose bolus, 125 mL, PRN   Or  dextrose bolus, 250 mL, PRN  glucagon (rDNA), 1 mg, PRN  dextrose, , Continuous PRN        Labs      Recent Results (from the past 24 hour(s))   POCT Glucose    Collection Time: 10/04/22  8:45 AM   Result Value Ref Range    POC Glucose 115 (H) 70 - 99 MG/DL   POCT Glucose    Collection Time: 10/04/22  1:47 PM   Result Value Ref Range    POC Glucose 105 (H) 70 - 99 MG/DL   POCT Glucose Collection Time: 10/04/22  5:21 PM   Result Value Ref Range    POC Glucose 158 (H) 70 - 99 MG/DL   POCT Glucose    Collection Time: 10/04/22  7:53 PM   Result Value Ref Range    POC Glucose 186 (H) 70 - 99 MG/DL   CBC with Auto Differential    Collection Time: 10/05/22  4:45 AM   Result Value Ref Range    WBC 17.2 (H) 4.0 - 10.5 K/CU MM    RBC 5.64 4.6 - 6.2 M/CU MM    Hemoglobin 15.6 13.5 - 18.0 GM/DL    Hematocrit 47.7 42 - 52 %    MCV 84.6 78 - 100 FL    MCH 27.7 27 - 31 PG    MCHC 32.7 32.0 - 36.0 %    RDW 14.6 11.7 - 14.9 %    Platelets 668 448 - 577 K/CU MM    MPV 10.3 7.5 - 11.1 FL    Differential Type AUTOMATED DIFFERENTIAL     Segs Relative 83.8 (H) 36 - 66 %    Lymphocytes % 6.3 (L) 24 - 44 %    Monocytes % 9.3 (H) 0 - 4 %    Eosinophils % 0.1 0 - 3 %    Basophils % 0.2 0 - 1 %    Segs Absolute 14.5 K/CU MM    Lymphocytes Absolute 1.1 K/CU MM    Monocytes Absolute 1.6 K/CU MM    Eosinophils Absolute 0.0 K/CU MM    Basophils Absolute 0.0 K/CU MM    Immature Neutrophil % 0.3 0 - 0.43 %    Total Immature Neutrophil 0.05 K/CU MM   Basic Metabolic Panel w/ Reflex to MG    Collection Time: 10/05/22  4:45 AM   Result Value Ref Range    Sodium 141 135 - 145 MMOL/L    Potassium 4.5 3.5 - 5.1 MMOL/L    Chloride 103 99 - 110 mMol/L    CO2 28 21 - 32 MMOL/L    Anion Gap 10 4 - 16    BUN 13 6 - 23 MG/DL    Creatinine 0.7 (L) 0.9 - 1.3 MG/DL    Glucose 167 (H) 70 - 99 MG/DL    Calcium 9.3 8.3 - 10.6 MG/DL    GFR Non-African American >60 >60 mL/min/1.73m2    GFR African American >60 >60 mL/min/1.73m2   POCT Glucose    Collection Time: 10/05/22  8:21 AM   Result Value Ref Range    POC Glucose 161 (H) 70 - 99 MG/DL        Imaging/Diagnostics Last 24 Hours   XR KNEE RIGHT (1-2 VIEWS)    Result Date: 10/4/2022  EXAMINATION: TWO XRAY VIEWS OF THE RIGHT KNEE 10/4/2022 12:47 pm COMPARISON: 08/24/2022. HISTORY: ORDERING SYSTEM PROVIDED HISTORY: S/P R TKA TECHNOLOGIST PROVIDED HISTORY: Of operative side while in recovery room. Reason for exam:->S/P R TKA FINDINGS: Mineralization appears normal.  A total-knee arthroplasty has been performed in the interval.  Alignment appears normal.  No fracture, dislocation or focal osseous abnormality is identified. Normal baseline study after right knee arthroplasty.        Electronically signed by Mose Gowers, MD on 10/5/2022 at 8:40 AM

## 2022-10-05 NOTE — PROGRESS NOTES
Basilia Cintron is a 46 y.o. male patient. 1. Preop testing      Past Medical History:   Diagnosis Date    Cerebral artery occlusion with cerebral infarction ([facility]     \"had stroke 3/30/2020- had subarachnoid hemorrhage- dont remember my symtoms - all resolved now\"    Diabetes mellitus ([facility] [address]     dx 2020    History of short term memory loss     \"this has gotten better\"    Hyperlipidemia     Personal history of other medical treatment     per pt \"my wife and daughter have malignant hyperthermia\"    Subarachnoid hemorrhage ([facility] [address] 03/2020    Wears glasses     to read     No past surgical history pertinent negatives on file. Scheduled Meds:   sodium chloride flush  5-40 mL IntraVENous 2 times per day    acetaminophen  650 mg Oral Q6H    aspirin  325 mg Oral BID    atorvastatin  80 mg Oral Nightly    buPROPion  300 mg Oral QAM    fenofibrate  160 mg Oral Daily    Empagliflozin-linaGLIPtin  1 tablet Oral Daily    metFORMIN  500 mg Oral Dinner     Continuous Infusions:   lactated ringers 125 mL/hr at 10/05/22 0702    sodium chloride 50 mL/hr at 10/05/22 0256    dextrose       PRN Meds:sodium chloride flush, sodium chloride, ketorolac, oxyCODONE **OR** oxyCODONE, HYDROmorphone, glucose, dextrose bolus **OR** dextrose bolus, glucagon (rDNA), dextrose    No Known Allergies  Principal Problem:    History of total right knee replacement  Resolved Problems:    * No resolved hospital problems. *    Blood pressure 120/73, pulse 71, temperature 98 °F (36.7 °C), temperature source Temporal, resp. rate 16, height 5' 10\" (1.778 m), weight 234 lb 14.4 oz (106.5 kg), SpO2 98 %. Subjective  Patient seen and examined, resting in bed comfortably, pain controlled, no new complaints. To have some pain overnight but feeling better this morning. Objective:  Vital signs (most recent): Blood pressure 120/73, pulse 71, temperature 98 °F (36.7 °C), temperature source Temporal, resp.  rate 16, height 5' 10\" (1.778 m), weight 234 lb 14.4 oz (106.5 kg), SpO2 98 %. RLE - Dressing removed, incision clean, dry, intact, no calf TTP, compartments soft, neurovascularly intact distally. Assessment & Plan  POD #1 R TKA, Doing well postoperatively    Continue weight bearing as tolerated. Continue range of motion exercises. Pain control. DVT prophylaxis. Continue to ice and elevate. Continue PT/OT. Discharge home today.     Sebastian Parisi, DO  10/5/2022

## 2022-10-05 NOTE — FLOWSHEET NOTE
Physical Therapy Treatment Note   Date: 10/5/2022 Room: [address] [place] [address]    Name: Ephraim Glass : 1970   MRN: [id number] Admission Date:10/4/2022    Primary Problem:   Past Medical History:   Diagnosis Date    Cerebral artery occlusion with cerebral infarction ([facility]     \"had stroke 3/30/2020- had subarachnoid hemorrhage- dont remember my symtoms - all resolved now\"    Diabetes mellitus ([facility] [address]     2020    History of short term memory loss     \"this has gotten better\"    Hyperlipidemia     Personal history of other medical treatment     per pt \"my wife and daughter have malignant hyperthermia\"    Subarachnoid hemorrhage ([facility] [address] 2020    Wears glasses     to read     Past Surgical History:   Procedure Laterality Date    KNEE ARTHROSCOPY Right 10/1/2020    RIGHT KNEE ARTHROSCOPY , LATERAL MENISCECTOMY , LATERAL OPEN , CYST EXCISION performed by Eusebio Chris DO at [address]      OTHER SURGICAL HISTORY  2020    per old chart at [facility] had diagnostic angiogram for the [facility]- 3/2020, 2020 and 2020    TONSILLECTOMY  1978    VASECTOMY           Rehabilitation Diagnosis: difficulty walking    Assessment at Evaluation: patient is s/p R TKA - will review stair climbing 10/5/22    Restrictions/Precautions: RLE WBAT    Subjective: Patient sitting EOB, wife was helping patient get dressed for the day.     Initial Pain level: 6/10 R knee      Goals:                   Short Term Goals  Time Frame for Short Term Goals: 1 week  Short Term Goal 1: patient will asend/descend 1 step 6\" in // bars with mod I                   Plan of Care:                          Current Treatment Recommendations: Strengthening, ROM, Functional mobility training, Transfer training, Stair training, Gait training      Objective Findings:    Date: 10/5/22   Date:  Date:  Date:    Bed Mobility Mod I            STS Transfer   SBA        Stand Pivot Transfer   SBA        Commode Transfer   x Sitting/Standing Balance For ambulation with BUE support CGA          Ambulation Pt ambulated ~150ft with RW and CGA. Antalgic gait pattern, forefoot initial contact. VC for heel strike pt had difficulty d/t pain and lack of knee extension      Stairs [id number] up/down with hand rail on R and proper step pattern after VC. Patient reports increased pain during step training. Exercises:   Exercise/  Equipment/  Modalities  Date:  Date:  Date:  Date:                                                                               Modality/intervention used:   [] Therapeutic Exercise     [] Modalities   [x] Therapeutic Activity       [] Ultrasound     [] Elec Stim   [x] Gait Training       [] Cervical Traction    [] Lumbar Traction   [] Neuromuscular Re-education     [] Cold/hotpack    [] Iontophoresis   [] Instruction in HEP       [] Vasopneumatic   [] Manual Therapy   [] Aquatic Therapy     Home Exercise Program/Education provided to patient: Discussed HEP exercises, pt reports having a handout at home. Communication with other providers: Co-treat with OT for part of session      Adverse reactions to treatment: increased pain      Treatment/Activity Tolerance:   [] Patient limited by fatigue   [x] Patient limited by pain   [] Patient limited by other medical complications   [] Patient tolerated therapy well  [x] Other: extensive discussion with patient and wife about home setup. PT recommended pt stay on level floor of house until OP eval on 10/7. Patient has a place to sleep and bathroom on first floor.      Post Tx Pain Ratin /10     Safety Precautions:     [x] Left in bed    [] Left in chair   [x] Call light within reach    [] Bed alarm on    [] Personal alarm on    [] Other staff present:    Plan:   [x] Continue per plan of care   [] Hold pending MD visit       Time In 1002   Time Out 1040   Total Minutes  38   Billed Units 1TA, 2 Gait       Signed: Ivet Gautam, PT, DPT [id number] 10/5/2022, 11:00 AM

## 2022-10-05 NOTE — PROGRESS NOTES
Occupational Therapy  Facility/Department: [facility] UNIT  Occupational Therapy Initial Assessment    Name: Earnest Coombs  : 1970  MRN: [id number]  Date of Service: 10/5/2022    Discharge Recommendations:  Home with assist PRN          Patient Diagnosis(es): The primary encounter diagnosis was Preop testing. A diagnosis of History of total right knee replacement was also pertinent to this visit. Past Medical History:  has a past medical history of Cerebral artery occlusion with cerebral infarction ([address] Diabetes mellitus ([address] History of short term memory loss, Hyperlipidemia, Personal history of other medical treatment, Subarachnoid hemorrhage ([address] and Wears glasses. Past Surgical History:  has a past surgical history that includes other surgical history (2020); Tonsillectomy (); Vasectomy (); Myringotomy Tympanostomy Tube Placement (); and Knee arthroscopy (Right, 10/1/2020). Treatment Diagnosis: generalized weakness      Assessment   Performance deficits / Impairments: Decreased functional mobility ; Decreased balance;Decreased endurance;Decreased strength  Assessment: PAtient is a 46 y.o male admitted to the hospital s/p R TKA. Patient to be seen by skilled OT to improve independence with functional transfers, ADls, and activity tolerance for general strengthening.   Treatment Diagnosis: generalized weakness  Prognosis: Good  Decision Making: Medium Complexity  REQUIRES OT FOLLOW-UP: Yes  Activity Tolerance  Activity Tolerance: Patient Tolerated treatment well        Plan   Occupational Therapy Plan  Times Per Week: 3+  Current Treatment Recommendations: Strengthening, Functional mobility training, Safety education & training, Patient/Caregiver education & training, Equipment evaluation, education, & procurement, Self-Care / ADL, Home management training     Restrictions  Restrictions/Precautions  Restrictions/Precautions: Weight Bearing  Lower Extremity Weight Bearing Restrictions  Right Lower Extremity Weight Bearing: Weight Bearing As Tolerated    Subjective   General  Patient assessed for rehabilitation services?: Yes  Family / Caregiver Present: Yes  Subjective  Subjective: Patient standing EOB upon arrival.     Social/Functional History  Social/Functional History  Lives With: Spouse  Type of Home: House  Home Layout: Multi-level (7-8 steps between floors)  Home Access: Level entry  ADL Assistance: Independent  Homemaking Assistance: Independent  Ambulation Assistance: Independent  Transfer Assistance: Independent  Active : Yes  Mode of Transportation: Car  Occupation: Full time employment  Type of Occupation:        Objective   Heart Rate: 93  Heart Rate Source: Monitor  BP: 124/76  BP Location: Right upper arm  BP Method: Automatic  Patient Position: High fowlers  MAP (Calculated): 92  Resp: 16  SpO2: 96 %  O2 Device: None (Room air)  Safety Devices  Type of Devices: Call light within reach;Gait belt;Left in bed (Left with PT)  Gait  Overall Level of Assistance: Contact-guard assistance  Distance (ft): 50 Feet  Assistive Device: Walker, rolling  AROM: Within functional limits  Strength:  Within functional limits  Tone: Normal  Transfers  Sit to stand: Stand by assistance  Stand to sit: Stand by assistance  Education Given To: Patient  Education Provided: Role of Therapy;Plan of Care;ADL Adaptive Strategies;Transfer Training  Education Provided Comments: Provided education and patient demo'd using sock aide for LB dressing  LUE AROM (degrees)  LUE AROM : WNL  RUE AROM (degrees)  RUE AROM : WNL      G-Code     OutComes Score                                                  AM-PAC Score             Tinneti Score       Goals  Short Term Goals  Time Frame for Short Term Goals: until discharge or all goals met  Short Term Goal 1: Patient will complete functional transfers MI using AE PRN  Short Term Goal 2: Patient will complete all toilet tasks MI  Short Term Goal 3: Patient will complete UB/LB dressing MI using AE PRN  Short Term Goal 4: Patient will demo 10+ activity tolerance with ADLs MI using AE PRN       Therapy Time   Individual Concurrent Group Co-treatment   Time In        1005   Time Out        1040   Minutes        [address] [place] [address]

## 2022-10-05 NOTE — DISCHARGE SUMMARY
ADMIT DATE: 10/4/2022    DISCHARGE DATE: 10/5/2022    PROVIDER:     Lois Bermudez DO                      ADMISSION DIAGNOSIS:  Right knee DJD. DISCHARGE DIAGNOSIS:  Right knee DJD. PROCEDURE:  Right total knee arthroplasty on 10/4/2022. HOSPITAL COURSE:  The patient is a 55-year-old male with a  longstanding history of right knee pain. For this, he was brought to  [facility] on 10/4/2022 where he underwent right   total knee arthroplasty. He was admitted postoperatively  for pain control, rehabilitation, and medical monitoring. Hospitalist  was consulted for medical management. Physical Therapy was consulted  for gait training. He did receive antibiotic prophylaxis and DVT  prophylaxis during his hospitalization. Throughout his hospital  course, clinically, he remained stable with no apparent medical  complications. He was progressing well with physical therapy and his  pain was well controlled with oral medications.  was  consulted for discharge planning. Given that clinically he was  stable, he was discharged to home on 10/5/2022 . DISCHARGE MEDICATIONS:    1. Oxycodone 5 mg one p.o. q.6 h. p.r.n. Pain. 2.   mg p.o. b.i.d. for 14 days  3. Other medications per the [facility] DISCHARGE INSTRUCTIONS:    He is to have physical therapy for gait training and range of motion, and can be weightbearing as tolerated on the right leg. His incision is to be kept clean, dry, and intact at  all times. He is to ice and elevate the right lower extremity for pain and swelling. He is to contact my office if he develops increased pain, swelling, numbness, tingling, redness, fevers, or chills. He is to follow up in my office in 3 weeks at his prescheduled appointment.            Sebastian Parisi DO

## 2022-10-07 ENCOUNTER — HOSPITAL ENCOUNTER (OUTPATIENT)
Dept: PHYSICAL THERAPY | Age: 52
Setting detail: THERAPIES SERIES
Discharge: HOME | End: 2022-10-07
Payer: COMMERCIAL

## 2022-10-07 PROCEDURE — 97016 VASOPNEUMATIC DEVICE THERAPY: CPT

## 2022-10-07 PROCEDURE — 97110 THERAPEUTIC EXERCISES: CPT

## 2022-10-07 PROCEDURE — 97164 PT RE-EVAL EST PLAN CARE: CPT

## 2022-10-07 NOTE — FLOWSHEET NOTE
Outpatient Physical Therapy  [facility]           [x] Phone: [phone]   Fax: [phone]  [facility]           [] Phone: [phone]   Fax: [phone]        Physical Therapy Daily Treatment Note  Date:  10/7/2022    Patient Name:  Jody Polanco    :  1970  MRN: [id number]  Restrictions/Precautions: Restrictions/Precautions: Weight Bearing As Tolerated         Diagnosis:    R TKA  Primary osteoarthritis of right knee [M17.11]  Chronic pain of right knee [M25.561, G89.29]    Date of Injury/Surgery: 10/4/2022  Treatment Diagnosis:     Insurance/Certification information:  [employer]   Referring Physician:  Martina Bergeron DO     PCP: Mark Mojica MD  Next Doctor Visit:    Plan of care signed (Y/N): N, sent 10/7/22    Outcome Measure: LEFS:  full function   Visit# / total visits:   Pain level: 7/10   Goals:     Patient goals:    Short term goals  Time Frame for Short term goals:   Defer to Long Term Goals                  Long Term Goals  Time Frame for Long Term Goals:        Long Term Goals: 6 weeks   Long Term Goal 1: Pt will demo I with HEP/symptom management. Long Term Goal 2: Pt will demo normal gait mechanics with min/no deficits to ease community mobility. Long Term Goal 3: Pt will demo 0-120 deg knee A/PROM to ease transfers. Long Term Goal 4: Pt will completed TUG <12 sec to demo improved mobility. Long Term Goal 5: Pt will demo >50/80 improvement per LEFS to demo improved functional mobility. Long Term Goal 6: Pt will demo 5x sit to stand <15 sec to demo improved LE strength and ease with transfers. Summary of Evaluation:   Pt is 46year old male s/p R TKA 10/4/22. Pt now has difficulties completing general mobility, ADLs and transfers. Pt demo deficits this date that include knee ROM, quad activation, gait mechanics, effusion and pain. Pt will benefit with PT services with progression of strength/ROM, manual, modalities to return to PLOF.  Pt prior to onset of current condition had min/no pain with able to complete full ADLs and work activities. Patient received education on their current pathology and how their condition effects them with their functional activities. Patient understood discussion and questions were answered. Patient understands their activity limitations and understands rational for treatment progression. Subjective:  7/10 pain. Completing HEP as instructed. Icing, elevating and taking meds as instructed. Return follow up at end of the month. Walking with walker. Denies N/T. Soreness into thigh region. Any changes in Ambulatory Summary Sheet? None        Objective:  See eval   COVID screening questions were asked and patient attested that there had been no contact or symptoms    Min tenderness into thigh to distal knee at incision. Entered with FWW   L SLS: >30 sec       R SLS: 0 sec   -10 deg knee ext to 85 deg knee flexion     5x sit to stand: 28.48 sec   TUG: not assessed at this time. Poor quad activation     LEFS:  7/80 full function     Exercises: (No more than 4 columns)   Exercise/Equipment 10/7/22 #2 Date Date           WARM UP         Nu step  5'  Lv1            TABLE      *Quad set  10x2   3\"      *Ankle pumps 20x     *Heel prop  30\"x3     *Sitting AAROM knee flexion stretch  10x2  3\"         Bridge       LAQ                                    STANDING      UE marches with FWW 20x2     Step taps                                          PROPRIOCEPTION      Wobbleboard                               MODALITIES      Vaso  10'               Other Therapeutic Activities/Education:  --      Home Exercise Program:      Access Code: [email or web address]  URL: [email or web address]  Date: 09/20/2022  Prepared by:  Estela Sosa    Exercises  Clamshell - 1 x daily - 7 x weekly - 3 sets - 10 reps  Supine Active Straight Leg Raise - 1 x daily - 7 x weekly - 3 sets - 10 reps  Prone Hip Extension - 1 x daily - 7 x weekly - 3 sets - 10 reps      Access Code: Katie Colon: [email or web address]  Date: 09/20/2022  Prepared by: Dano Díaz    Exercises  Supine Heel Slide with Strap - 1 x daily - 7 x weekly - 3 sets - 10 reps  Long Sitting Quad Set with Towel Roll Under Heel - 1 x daily - 7 x weekly - 3 sets - 10 reps  Supine Ankle Pumps - 1 x daily - 7 x weekly - 3 sets - 10 reps  Staggered Stance Forward Backward Weight Shift with Unilateral Counter Support - 1 x daily - 7 x weekly - 3 sets - 10 reps  Side to Side Weight Shift with Counter Support - 1 x daily - 7 x weekly - 3 sets - 10 reps      Manual Treatments:  PROM knee flexion       Modalities:  .Gameready to R knee  supine, low pressure, 34 deg x10' for pain management. No adverse effects. Communication with other providers:  POC sent 10/7/22       Assessment:  (Response towards treatment session) (Pain Rating)      Pt is 46year old male s/p R TKA 10/4/22. Pt now has difficulties completing general mobility, ADLs and transfers. Pt demo deficits this date that include knee ROM, quad activation, gait mechanics, effusion and pain. Pt will benefit with PT services with progression of strength/ROM, manual, modalities to return to PLOF. Pt prior to onset of current condition had min/no pain with able to complete full ADLs and work activities. Patient received education on their current pathology and how their condition effects them with their functional activities. Patient understood discussion and questions were answered. Patient understands their activity limitations and understands rational for treatment progression. Plan for Next Session:  functional progression as tolerated.         Time In / Time Out:    15/52'                15' TE, 1 re-eval, 10' vaso     Time In  Time Out    1324 [id number]       Timed Code/Total Treatment Minutes:     13' TE, 1 re-eval, 10' vaso     Treatment Charges: Mins Units   [] Evaluation       []  Low       []  Moderate       []  High       [] Modalities     []  Ther Exercise       []  Manual Therapy     []  Ther Activities     []  Aquatics     []  Vasocompression     []  Other         Next Progress Note due:  Re-Eval 10/7/22            Plan of Care Interventions:  [x] Therapeutic Exercise  [x] Modalities:  [x] Therapeutic Activity     [] Ultrasound  [x] Estim  [x] Gait Training      [] Cervical Traction [] Lumbar Traction  [x] Neuromuscular Re-education    [x] Cold/hotpack [] Iontophoresis   [x] Instruction in HEP      [x] Vasopneumatic   [x] Dry Needling    [x] Manual Therapy               [] Aquatic Therapy              Electronically signed by:  Lord Miller, PT, DPT, OCS  10/7/2022, 8:04 AM     10/7/2022 8:06 AM

## 2022-10-07 NOTE — PLAN OF CARE
Outpatient Physical Therapy           [facility]           [x] Phone: [phone]   Fax: [phone]  [facility]           [] Phone: [phone]   Fax: [phone]     To: Stacey Stephenson DO     From: Jody Montes, PT, DPT, OCS    Patient: Yaakov Santos       : 1970  Diagnosis: R TKA   10/4/22   Primary osteoarthritis of right knee [M17.11]  Chronic pain of right knee [M25.561, G89.29]    Treatment Diagnosis:    Date: 10/7/2022    Physical Therapy Certification/Re-Certification Form  Dear Dr. Claudell Palau,   The following patient has been evaluated for physical therapy services and for therapy to continue, insurance requires physician review of the treatment plan initially and every 90 days. Please review the attached evaluation and/or summary of the patient's plan of care, and verify that you agree therapy should continue by signing the attached document and sending it back to our office. Assessment:       Pt is 46year old male s/p R TKA 10/4/22. Pt now has difficulties completing general mobility, ADLs and transfers. Pt demo deficits this date that include knee ROM, quad activation, gait mechanics, effusion and pain. Pt will benefit with PT services with progression of strength/ROM, manual, modalities to return to PLOF. Pt prior to onset of current condition had min/no pain with able to complete full ADLs and work activities. Patient received education on their current pathology and how their condition effects them with their functional activities. Patient understood discussion and questions were answered. Patient understands their activity limitations and understands rational for treatment progression.        Plan of Care/Treatment to date:  [x] Therapeutic Exercise  [] Modalities:  [x] Therapeutic Activity     [] Ultrasound  [] Electrical Stimulation  [x] Gait Training      [] Cervical Traction [] Lumbar Traction  [x] Neuromuscular Re-education    [] Cold/hotpack [] Iontophoresis   [x] Instruction in HEP      [x] Vasopneumatic    [x] Dry Needling  [x] Manual Therapy               [] Aquatic Therapy       Other:          Frequency/Duration:  # Days per week: [] 1 day # Weeks: [] 1 week [] 5 weeks     [x] 2 days   [] 2 weeks [x] 6 weeks     [] 3 days   [] 3 weeks [] 7 weeks     [] 4 days   [] 4 weeks [] 8 weeks         [] 9 weeks [] 10 weeks         [] 11 weeks [] 12 weeks    Rehab Potential/Progress: [] Excellent [x] Good [] Fair  [] Poor     Goals:          Patient goals:  improve pain. Short term goals  Time Frame for Short term goals:   Defer to [address] Term Goals  Time Frame for Long Term Goals:     Long Term Goals: 6 weeks   Long Term Goal 1: Pt will demo I with HEP/symptom management. Long Term Goal 2: Pt will demo normal gait mechanics with min/no deficits to ease community mobility. Long Term Goal 3: Pt will demo 0-120 deg knee A/PROM to ease transfers. Long Term Goal 4: Pt will completed TUG <12 sec to demo improved mobility. Long Term Goal 5: Pt will demo >50/80 improvement per LEFS to demo improved functional mobility. Long Term Goal 6: Pt will demo 5x sit to stand <15 sec to demo improved LE strength and ease with transfers. Electronically signed by:  Pradeep Ashton PT, DPT, OCS  10/7/2022, 8:03 AM    10/7/2022 8:03 AM       If you have any questions or concerns, please don't hesitate to call.   Thank you for your referral.      Physician Signature:________________________________Date:_________ TIME: _____  By signing above, therapists plan is approved by physician

## 2022-10-12 ENCOUNTER — HOSPITAL ENCOUNTER (OUTPATIENT)
Dept: PHYSICAL THERAPY | Age: 52
Setting detail: THERAPIES SERIES
Discharge: HOME | End: 2022-10-12
Payer: COMMERCIAL

## 2022-10-12 PROCEDURE — 97110 THERAPEUTIC EXERCISES: CPT

## 2022-10-12 PROCEDURE — 97116 GAIT TRAINING THERAPY: CPT

## 2022-10-12 PROCEDURE — 97140 MANUAL THERAPY 1/> REGIONS: CPT

## 2022-10-12 NOTE — FLOWSHEET NOTE
Outpatient Physical Therapy  [facility]           [x] Phone: [phone]   Fax: [phone]  [facility]           [] Phone: [phone]   Fax: [phone]        Physical Therapy Daily Treatment Note  Date:  10/12/2022    Patient Name:  Yaakov Santos    :  1970  MRN: [id number]  Restrictions/Precautions: Restrictions/Precautions: Weight Bearing As Tolerated         Diagnosis:    R TKA  Primary osteoarthritis of right knee [M17.11]  Chronic pain of right knee [M25.561, G89.29]    Date of Injury/Surgery: 10/4/2022  Treatment Diagnosis:     Insurance/Certification information:  [facility]   Referring Physician:  Stacey Stephenson DO     PCP: Kaley Soliz MD  Next Doctor Visit:    Plan of care signed (Y/N): N, sent 10/7/22    Outcome Measure: LEFS:  full function   Visit# / total visits: 3 /12  Pain level: 3/10   Goals:     Patient goals:    Short term goals  Time Frame for Short term goals:   Defer to Long Term Goals                  Long Term Goals  Time Frame for Long Term Goals:        Long Term Goals: 6 weeks   Long Term Goal 1: Pt will demo I with HEP/symptom management. Long Term Goal 2: Pt will demo normal gait mechanics with min/no deficits to ease community mobility. Long Term Goal 3: Pt will demo 0-120 deg knee A/PROM to ease transfers. Long Term Goal 4: Pt will completed TUG <12 sec to demo improved mobility. Long Term Goal 5: Pt will demo >50/80 improvement per LEFS to demo improved functional mobility. Long Term Goal 6: Pt will demo 5x sit to stand <15 sec to demo improved LE strength and ease with transfers. Summary of Evaluation:   Pt is 46year old male s/p R TKA 10/4/22. Pt now has difficulties completing general mobility, ADLs and transfers. Pt demo deficits this date that include knee ROM, quad activation, gait mechanics, effusion and pain. Pt will benefit with PT services with progression of strength/ROM, manual, modalities to return to PLOF.  Pt prior to onset of current condition had min/no pain with able to complete full ADLs and work activities. Patient received education on their current pathology and how their condition effects them with their functional activities. Patient understood discussion and questions were answered. Patient understands their activity limitations and understands rational for treatment progression. Subjective:   Pt reports that he isn't doing stairs at home and doesn't feel confident to do them. Pt notes that pain level is tolerable, but doesn't like to use the CP d/t making quads cramp. Pt's wife would like to know how to help pt use stairs at home. Any changes in Ambulatory Summary Sheet? None        Objective:     COVID screening questions were asked and patient attested that there had been no contact or symptoms    Min tenderness into thigh to distal knee at incision. Entered with FWW   L SLS: >30 sec       R SLS: 0 sec   -10 deg knee ext to 85 deg knee flexion     5x sit to stand: 28.48 sec   TUG: not assessed at this time.    Poor quad activation   LEFS:  7/80 full function     10/12/22 PROM: -3 deg knee ext to 100 deg flexion   AROM:-5 deg knee ext to 96 deg flexion    Exercises: (No more than 4 columns)   Exercise/Equipment 10/7/22 #2 10/12/22 #3 Date           WARM UP         Nu step  5'  Lv1  ---     Star trac   5min    TABLE      *Quad set  10x2   3\"  10x2   3\"     *Ankle pumps 20x --    *Heel prop  30\"x3 30\"x3    *Sitting AAROM knee flexion stretch  10x2  3\"  Heel slide supine with stretch 10x       Bridge   10x    LAQ  10x                                  STANDING      LE marches with FWW 20x2 20x2    Step taps       Stair training with and without cane and use of rail  6\" x 6 flights                                 PROPRIOCEPTION      Wobbleboard                               MODALITIES      Vaso  10' --              Other Therapeutic Activities/Education:  --      Home Exercise Program:      Access Code: [email or web address]  URL: [email or web address]  Date: 09/20/2022  Prepared by: Anson Onofre    Exercises  Clamshell - 1 x daily - 7 x weekly - 3 sets - 10 reps  Supine Active Straight Leg Raise - 1 x daily - 7 x weekly - 3 sets - 10 reps  Prone Hip Extension - 1 x daily - 7 x weekly - 3 sets - 10 reps      Access Code: Quinfransico Fenton  URL: [email or web address]  Date: 09/20/2022  Prepared by: Merdanielea Kingston    Exercises  Supine Heel Slide with Strap - 1 x daily - 7 x weekly - 3 sets - 10 reps  Long Sitting Quad Set with Towel Roll Under Heel - 1 x daily - 7 x weekly - 3 sets - 10 reps  Supine Ankle Pumps - 1 x daily - 7 x weekly - 3 sets - 10 reps  Staggered Stance Forward Backward Weight Shift with Unilateral Counter Support - 1 x daily - 7 x weekly - 3 sets - 10 reps  Side to Side Weight Shift with Counter Support - 1 x daily - 7 x weekly - 3 sets - 10 reps      Manual Treatments:  PROM knee flexion with slight overpressure      Modalities:  .pt declined gameready       Communication with other providers:  POC sent 10/7/22       Assessment:  (Response towards treatment session) (Pain Rating)  Pt tolerated tx well with good response to stair training. Assisted pt to use cane with railing as pt has cane at home and wall on opposite side of railing is too far. Noted improves stability with stairs following this. Suggested pt use coffee can or peanut butter jar for hard surface for heel prop at home and to not use pillow under knee as pt and wife concerned about making sure pt progresses to full knee ext. Pt challenged with stretching and PROM today but made good gains and noted that pt nearly able to do full revolutions backwards on bike. Pt is 46year old male s/p R TKA 10/4/22. Pt now has difficulties completing general mobility, ADLs and transfers. Pt demo deficits this date that include knee ROM, quad activation, gait mechanics, effusion and pain.  Pt will benefit with PT services with progression of strength/ROM, manual, modalities to return to PLOF. Pt prior to onset of current condition had min/no pain with able to complete full ADLs and work activities. Patient received education on their current pathology and how their condition effects them with their functional activities. Patient understood discussion and questions were answered. Patient understands their activity limitations and understands rational for treatment progression. Plan for Next Session:  functional progression as tolerated.         Time In / Time Out:         Time In  Time Out    1720  1810       Timed Code/Total Treatment Minutes:     15' TE,15' gait, 20' man    Treatment Charges: Mins Units   [] Evaluation       []  Low       []  Moderate       []  High       []  Modalities     [x]  Ther Exercise  15 1    [x]  Manual Therapy 20 1   []  Ther Activities     []  Aquatics     []  Vasocompression     [x]  Other gait 15 1       Next Progress Note due:  Re-Eval 10/7/22            Plan of Care Interventions:  [x] Therapeutic Exercise  [x] Modalities:  [x] Therapeutic Activity     [] Ultrasound  [x] Estim  [x] Gait Training      [] Cervical Traction [] Lumbar Traction  [x] Neuromuscular Re-education    [x] Cold/hotpack [] Iontophoresis   [x] Instruction in HEP      [x] Vasopneumatic   [x] Dry Needling    [x] Manual Therapy               [] Aquatic Therapy              Electronically signed by:  Stefania Hernandez PTA  10/12/2022, 2:58 PM     10/12/2022 2:58 PM

## 2022-10-14 ENCOUNTER — HOSPITAL ENCOUNTER (OUTPATIENT)
Dept: PHYSICAL THERAPY | Age: 52
Setting detail: THERAPIES SERIES
Discharge: HOME | End: 2022-10-14
Payer: COMMERCIAL

## 2022-10-14 PROCEDURE — 97140 MANUAL THERAPY 1/> REGIONS: CPT

## 2022-10-14 PROCEDURE — 97110 THERAPEUTIC EXERCISES: CPT
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
## 2022-10-17 ENCOUNTER — HOSPITAL ENCOUNTER (OUTPATIENT)
Dept: PHYSICAL THERAPY | Age: 52
Setting detail: THERAPIES SERIES
Discharge: HOME | End: 2022-10-17
Payer: COMMERCIAL

## 2022-10-17 PROCEDURE — 97140 MANUAL THERAPY 1/> REGIONS: CPT

## 2022-10-17 PROCEDURE — 97110 THERAPEUTIC EXERCISES: CPT

## 2022-10-17 PROCEDURE — 97016 VASOPNEUMATIC DEVICE THERAPY: CPT

## 2022-10-17 NOTE — FLOWSHEET NOTE
Outpatient Physical Therapy  [place]           [x] Phone: [phone]   Fax: [phone]  [place]           [] Phone: [phone]   Fax: [phone]        Physical Therapy Daily Treatment Note  Date:  10/17/2022    Patient Name:  Hannah Carrillo    :  1970  MRN: [id number]  Restrictions/Precautions: Restrictions/Precautions: Weight Bearing As Tolerated         Diagnosis:    R TKA  Primary osteoarthritis of right knee [M17.11]  Chronic pain of right knee [M25.561, G89.29]    Date of Injury/Surgery: 10/4/2022  Treatment Diagnosis:     Insurance/Certification information:  [facility]   Referring Physician:  Ranjan Johnston DO     PCP: Wilfredo Shelton MD  Next Doctor Visit:    Plan of care signed (Y/N): N, sent 10/7/22    Outcome Measure: LEFS:  full function   Visit# / total visits:   Pain level:  /10   Goals:     Patient goals:    Short term goals  Time Frame for Short term goals:   Defer to Long Term Goals                  Long Term Goals  Time Frame for Long Term Goals:        Long Term Goals: 6 weeks   Long Term Goal 1: Pt will demo I with HEP/symptom management. Long Term Goal 2: Pt will demo normal gait mechanics with min/no deficits to ease community mobility. Long Term Goal 3: Pt will demo 0-120 deg knee A/PROM to ease transfers. Long Term Goal 4: Pt will completed TUG <12 sec to demo improved mobility. Long Term Goal 5: Pt will demo >50/80 improvement per LEFS to demo improved functional mobility. Long Term Goal 6: Pt will demo 5x sit to stand <15 sec to demo improved LE strength and ease with transfers. Summary of Evaluation:   Pt is 46year old male s/p R TKA 10/4/22. Pt now has difficulties completing general mobility, ADLs and transfers. Pt demo deficits this date that include knee ROM, quad activation, gait mechanics, effusion and pain. Pt will benefit with PT services with progression of strength/ROM, manual, modalities to return to PLOF.  Pt prior to onset of current condition had min/no pain with able to complete full ADLs and work activities. Patient received education on their current pathology and how their condition effects them with their functional activities. Patient understood discussion and questions were answered. Patient understands their activity limitations and understands rational for treatment progression. Subjective:   Pt arrives to tx session reporting 0/10 pain mostly stiffness in R knee. Any changes in Ambulatory Summary Sheet? None      Objective:     COVID screening questions were asked and patient attested that there had been no contact or symptoms    Lacking 7 deg ext   Flexion 111 deg    Exercises: (No more than 4 columns)   Exercise/Equipment 10/7/22 #2 10/12/22 #3 10/14/22  #4 10/17/22 #5            WARM UP          Nu step  5'  Lv1  ---  5' L4   Recumbent bike   5'     Star trac   5min     TABLE       Manual therapy   See below See below   *Quad set  10x2   3\"  10x2   3\"      *Ankle pumps 20x --     *Heel prop  30\"x3 30\"x3     *Sitting AAROM knee flexion stretch  10x2  3\"  Heel slide supine with stretch 10x  2x10 heel slide      Bridge   10x  2x10   LAQ  10x  2x10   Heel slides w/ strap   1x10 2x10   SLR   1x10 2x10                        STANDING       LE marches with FWW 20x2 20x2     Step taps        Stair training with and without cane and use of rail  6\" x 6 flights                                      PROPRIOCEPTION       Wobbleboard                                    MODALITIES       Vaso  10' --  10'              Other Therapeutic Activities/Education:  --      Home Exercise Program:      Access Code: [email or web address]  URL: [email or web address]  Date: 09/20/2022  Prepared by:  Flor Malave    Exercises  Clamshell - 1 x daily - 7 x weekly - 3 sets - 10 reps  Supine Active Straight Leg Raise - 1 x daily - 7 x weekly - 3 sets - 10 reps  Prone Hip Extension - 1 x daily - 7 x weekly - 3 sets - 10 reps      Access Code: [facility]  URL: [email or web address]  Date: 09/20/2022  Prepared by: Angie Wang    Exercises  Supine Heel Slide with Strap - 1 x daily - 7 x weekly - 3 sets - 10 reps  Long Sitting Quad Set with Towel Roll Under Heel - 1 x daily - 7 x weekly - 3 sets - 10 reps  Supine Ankle Pumps - 1 x daily - 7 x weekly - 3 sets - 10 reps  Staggered Stance Forward Backward Weight Shift with Unilateral Counter Support - 1 x daily - 7 x weekly - 3 sets - 10 reps  Side to Side Weight Shift with Counter Support - 1 x daily - 7 x weekly - 3 sets - 10 reps      Manual Treatments:  Stretching into ext/flexion       Modalities:  Vaso 10'       Communication with other providers:  POC sent 10/7/22       Assessment:  Pt tolerates tx session well without adverse reactions or complications. Able to achieve 111 deg flexion today; still limited in ext, lacking 7deg. Pt will continue to benefit from PT services with progression of strength/ROM, manual, modalities to return to PLOF. Pt is 46year old male s/p R TKA 10/4/22. Pt now has difficulties completing general mobility, ADLs and transfers. Pt demo deficits this date that include knee ROM, quad activation, gait mechanics, effusion and pain. Pt will benefit with PT services with progression of strength/ROM, manual, modalities to return to PLOF. Pt prior to onset of current condition had min/no pain with able to complete full ADLs and work activities. Patient received education on their current pathology and how their condition effects them with their functional activities. Patient understood discussion and questions were answered. Patient understands their activity limitations and understands rational for treatment progression. Plan for Next Session:  functional progression as tolerated.         Time In / Time Out:     1709 / 1751       Timed Code/Total Treatment Minutes:     28' / 42'    1 TE    1 Man    1 Vaso      Treatment Charges: Mins Units   [] Evaluation       [] Low       []  Moderate       []  High       []  Modalities     [x]  Ther Exercise 16  1    [x]  Manual Therapy 16 1   []  Ther Activities     []  Aquatics     [x]  Vasocompression 10 1   []  Other gait         Next Progress Note due:  Re-Eval 10/7/22            Plan of Care Interventions:  [x] Therapeutic Exercise  [x] Modalities:  [x] Therapeutic Activity     [] Ultrasound  [x] Estim  [x] Gait Training      [] Cervical Traction [] Lumbar Traction  [x] Neuromuscular Re-education    [x] Cold/hotpack [] Iontophoresis   [x] Instruction in HEP      [x] Vasopneumatic   [x] Dry Needling    [x] Manual Therapy               [] Aquatic Therapy              Electronically signed by:  Dalila Glass, PTA  10/17/2022 5:04 PM

## 2022-10-21 ENCOUNTER — HOSPITAL ENCOUNTER (OUTPATIENT)
Dept: PHYSICAL THERAPY | Age: 52
Setting detail: THERAPIES SERIES
Discharge: HOME | End: 2022-10-21
Payer: COMMERCIAL

## 2022-10-21 PROCEDURE — 97110 THERAPEUTIC EXERCISES: CPT

## 2022-10-21 PROCEDURE — 97530 THERAPEUTIC ACTIVITIES: CPT

## 2022-10-21 PROCEDURE — 97016 VASOPNEUMATIC DEVICE THERAPY: CPT

## 2022-10-21 NOTE — FLOWSHEET NOTE
Outpatient Physical Therapy  [place]           [x] Phone: [phone]   Fax: [phone]  [place]           [] Phone: [phone]   Fax: [phone]        Physical Therapy Daily Treatment Note  Date:  10/21/2022    Patient Name:  Ephraim Glass    :  1970  MRN: [id number]  Restrictions/Precautions: Restrictions/Precautions: Weight Bearing As Tolerated   Diagnosis:    R TKA  Primary osteoarthritis of right knee [M17.11]  Chronic pain of right knee [M25.561, G89.29]    Date of Injury/Surgery: 10/4/2022  Treatment Diagnosis:     Insurance/Certification information:  [facility]   Referring Physician:  Doreene Rubinstein, DO     PCP: Shaylee Hinton MD  Next Doctor Visit:    Plan of care signed (Y/N): N, sent 10/7/22    Outcome Measure: LEFS:  full function   Visit# / total visits:   Pain level:  5/10   Goals:     Patient goals:    Short term goals  Time Frame for Short term goals:   Defer to Long Term Goals      Long Term Goals  Time Frame for Long Term Goals:        Long Term Goals: 6 weeks   Long Term Goal 1: Pt will demo I with HEP/symptom management. Long Term Goal 2: Pt will demo normal gait mechanics with min/no deficits to ease community mobility. Long Term Goal 3: Pt will demo 0-120 deg knee A/PROM to ease transfers. Long Term Goal 4: Pt will completed TUG <12 sec to demo improved mobility. Long Term Goal 5: Pt will demo >50/80 improvement per LEFS to demo improved functional mobility. Long Term Goal 6: Pt will demo 5x sit to stand <15 sec to demo improved LE strength and ease with transfers. Summary of Evaluation:   Pt is 46year old male s/p R TKA 10/4/22. Pt now has difficulties completing general mobility, ADLs and transfers. Pt demo deficits this date that include knee ROM, quad activation, gait mechanics, effusion and pain. Pt will benefit with PT services with progression of strength/ROM, manual, modalities to return to PLOF.  Pt prior to onset of current condition had min/no pain with able to complete full ADLs and work activities. Patient received education on their current pathology and how their condition effects them with their functional activities. Patient understood discussion and questions were answered. Patient understands their activity limitations and understands rational for treatment progression. Subjective:   Pt arrives to tx session reporting 5/10 pain in R knee. Any changes in Ambulatory Summary Sheet? None      Objective:     COVID screening questions were asked and patient attested that there had been no contact or symptoms    Lacking 5 deg ext   Flexion 112 deg    Exercises: (No more than 4 columns)   Exercise/Equipment 10/12/22 #3 10/14/22  #4 10/17/22 #5 10/21/22 #6            WARM UP          Nu step  ---  5' L4 5' L4   Recumbent bike  5'      Star trac  5min      TABLE       Manual therapy  See below See below See below   *Quad set  10x2   3\"       *Ankle pumps --      *Heel prop  30\"x3   4' 8#   *Sitting AAROM knee flexion stretch  Heel slide supine with stretch 10x  2x10 heel slide 2x10 heel slide      Bridge  10x  2x10 2x10   LAQ 10x  2x10 2x10   Heel slides w/ strap  1x10 2x10 2x10   SLR  1x10 2x10 2x10                        STANDING       LE marches with FWW 20x2      Step taps        Stair training with and without cane and use of rail 6\" x 6 flights   6\" x4 laps    Amb w/ SPC    100' x2                              PROPRIOCEPTION       Wobbleboard                                    MODALITIES       Vaso  --  10' 10'              Other Therapeutic Activities/Education:  --      Home Exercise Program:      Access Code: [email or web address]  URL: [email or web address]  Date: 09/20/2022  Prepared by:  Phillip Krishnamurthy    Exercises  Clamshell - 1 x daily - 7 x weekly - 3 sets - 10 reps  Supine Active Straight Leg Raise - 1 x daily - 7 x weekly - 3 sets - 10 reps  Prone Hip Extension - 1 x daily - 7 x weekly - 3 sets - 10 reps      Access Code: [facility]  URL: [email or web address] com/  Date: 09/20/2022  Prepared by: Anson Onofre    Exercises  Supine Heel Slide with Strap - 1 x daily - 7 x weekly - 3 sets - 10 reps  Long Sitting Quad Set with Towel Roll Under Heel - 1 x daily - 7 x weekly - 3 sets - 10 reps  Supine Ankle Pumps - 1 x daily - 7 x weekly - 3 sets - 10 reps  Staggered Stance Forward Backward Weight Shift with Unilateral Counter Support - 1 x daily - 7 x weekly - 3 sets - 10 reps  Side to Side Weight Shift with Counter Support - 1 x daily - 7 x weekly - 3 sets - 10 reps      Manual Treatments:         Modalities:  Vaso 10'       Communication with other providers:  POC sent 10/7/22       Assessment:  Pt tolerates tx session well without adverse reactions or complications. Able to achieve 112 deg flexion today, lacking 5 deg. Tolerates amb with SPC well; instructed to continue to use walker in community but could start with [facility] at home. Pt will continue to benefit from PT services with progression of strength/ROM, manual, modalities to return to PLOF. End pain: 0/10    Pt is 46year old male s/p R TKA 10/4/22. Pt now has difficulties completing general mobility, ADLs and transfers. Pt demo deficits this date that include knee ROM, quad activation, gait mechanics, effusion and pain. Pt will benefit with PT services with progression of strength/ROM, manual, modalities to return to PLOF. Pt prior to onset of current condition had min/no pain with able to complete full ADLs and work activities. Patient received education on their current pathology and how their condition effects them with their functional activities. Patient understood discussion and questions were answered. Patient understands their activity limitations and understands rational for treatment progression. Plan for Next Session:  functional progression as tolerated.         Time In / Time Out:    1619 / 1705      Timed Code/Total Treatment Minutes:     39' / 55'    1 TE    1 TA    1 Vaso      Treatment Charges: Mins Units   [] Evaluation       []  Low       []  Moderate       []  High       []  Modalities     [x]  Ther Exercise 18 1    []  Manual Therapy     [x]  Ther Activities 18 1   []  Aquatics     [x]  Vasocompression 10 1   []  Other gait         Next Progress Note due:  Re-Eval 10/7/22            Plan of Care Interventions:  [x] Therapeutic Exercise  [x] Modalities:  [x] Therapeutic Activity     [] Ultrasound  [x] Estim  [x] Gait Training      [] Cervical Traction [] Lumbar Traction  [x] Neuromuscular Re-education    [x] Cold/hotpack [] Iontophoresis   [x] Instruction in HEP      [x] Vasopneumatic   [x] Dry Needling    [x] Manual Therapy               [] Aquatic Therapy              Electronically signed by:  Efrain Monroe PTA  10/21/2022 4:24 PM

## 2022-10-24 ENCOUNTER — HOSPITAL ENCOUNTER (OUTPATIENT)
Dept: PHYSICAL THERAPY | Age: 52
Setting detail: THERAPIES SERIES
Discharge: HOME | End: 2022-10-24
Payer: COMMERCIAL

## 2022-10-24 ENCOUNTER — OFFICE VISIT (OUTPATIENT)
Dept: ORTHOPEDIC SURGERY | Age: 52
End: 2022-10-24

## 2022-10-24 VITALS
HEART RATE: 102 BPM | HEIGHT: 70 IN | WEIGHT: 232 LBS | TEMPERATURE: 97 F | SYSTOLIC BLOOD PRESSURE: 124 MMHG | BODY MASS INDEX: 33.21 KG/M2 | OXYGEN SATURATION: 98 % | RESPIRATION RATE: 16 BRPM | DIASTOLIC BLOOD PRESSURE: 80 MMHG

## 2022-10-24 DIAGNOSIS — Z96.651 S/P TOTAL KNEE ARTHROPLASTY, RIGHT: Primary | ICD-10-CM

## 2022-10-24 PROCEDURE — 97110 THERAPEUTIC EXERCISES: CPT

## 2022-10-24 PROCEDURE — 97530 THERAPEUTIC ACTIVITIES: CPT

## 2022-10-24 PROCEDURE — 97016 VASOPNEUMATIC DEVICE THERAPY: CPT

## 2022-10-24 PROCEDURE — 99024 POSTOP FOLLOW-UP VISIT: CPT | Performed by: PHYSICIAN ASSISTANT

## 2022-10-24 NOTE — FLOWSHEET NOTE
Outpatient Physical Therapy  [place]           [x] Phone: [phone]   Fax: [phone]  Maikol Levi           [] Phone: [phone]   Fax: [phone]        Physical Therapy Daily Treatment Note  Date:  10/24/2022    Patient Name:  Mg Leone    :  1970  MRN: [id number]  Restrictions/Precautions: Restrictions/Precautions: Weight Bearing As Tolerated   Diagnosis:    R TKA  Primary osteoarthritis of right knee [M17.11]  Chronic pain of right knee [M25.561, G89.29]    Date of Injury/Surgery: 10/4/2022  Treatment Diagnosis:     Insurance/Certification information:  [facility]   Referring Physician:  Bandar Seo DO     PCP: Chanel Acosta MD  Next Doctor Visit:    Plan of care signed (Y/N): N, sent 10/7/22    Outcome Measure: LEFS:  full function   Visit# / total visits:   Pain level:  6/10   Goals:     Patient goals:    Short term goals  Time Frame for Short term goals:   Defer to Long Term Goals      Long Term Goals  Time Frame for Long Term Goals:        Long Term Goals: 6 weeks   Long Term Goal 1: Pt will demo I with HEP/symptom management. Long Term Goal 2: Pt will demo normal gait mechanics with min/no deficits to ease community mobility. Long Term Goal 3: Pt will demo 0-120 deg knee A/PROM to ease transfers. Long Term Goal 4: Pt will completed TUG <12 sec to demo improved mobility. Long Term Goal 5: Pt will demo >50/80 improvement per LEFS to demo improved functional mobility. Long Term Goal 6: Pt will demo 5x sit to stand <15 sec to demo improved LE strength and ease with transfers. Summary of Evaluation:   Pt is 46year old male s/p R TKA 10/4/22. Pt now has difficulties completing general mobility, ADLs and transfers. Pt demo deficits this date that include knee ROM, quad activation, gait mechanics, effusion and pain. Pt will benefit with PT services with progression of strength/ROM, manual, modalities to return to PLOF.  Pt prior to onset of current condition had min/no pain with able to complete full ADLs and work activities. Patient received education on their current pathology and how their condition effects them with their functional activities. Patient understood discussion and questions were answered. Patient understands their activity limitations and understands rational for treatment progression. Subjective:   Pt arrives to tx session reporting 6/10 pain in R knee. Any changes in Ambulatory Summary Sheet? None      Objective:     COVID screening questions were asked and patient attested that there had been no contact or symptoms    Lacking 4 deg ext   Flexion 112 deg    Exercises: (No more than 4 columns)   Exercise/Equipment 10/17/22 #5 10/21/22 #6 10/24/22 #7           WARM UP         Nu step  5' L4 5' L4 5'   Recumbent bike       Star trac      TABLE      Manual therapy See below See below    *Quad set       *Ankle pumps      *Heel prop   4' 8# 4' 8#   *Sitting AAROM knee flexion stretch  2x10 heel slide 2x10 heel slide 2x10 heel slide      Bridge  2x10 2x10 2x10   LAQ 2x10 2x10 2x10   Heel slides w/ strap 2x10 2x10 2x10   SLR 2x10 2x10 2x10                     STANDING      LE marches with FWW      Step taps       Stair training with and without cane and use of rail  6\" x4 laps     Amb w/ SPC  100' x2     HR   2x10   Marching   2x10                    PROPRIOCEPTION      Wobbleboard                               MODALITIES      Vaso  10' 10' 10             Other Therapeutic Activities/Education:  --      Home Exercise Program:      Access Code: [email or web address]  URL: [email or web address]. com/  Date: 09/20/2022  Prepared by: Oscar Vera    Exercises  Clamshell - 1 x daily - 7 x weekly - 3 sets - 10 reps  Supine Active Straight Leg Raise - 1 x daily - 7 x weekly - 3 sets - 10 reps  Prone Hip Extension - 1 x daily - 7 x weekly - 3 sets - 10 reps      Access Code: Jayesh Grubbsvais  URL: [employer]/  Date: 09/20/2022  Prepared by:  Nilton Garcia Salas    Exercises  Supine Heel Slide with Strap - 1 x daily - 7 x weekly - 3 sets - 10 reps  Long Sitting Quad Set with Towel Roll Under Heel - 1 x daily - 7 x weekly - 3 sets - 10 reps  Supine Ankle Pumps - 1 x daily - 7 x weekly - 3 sets - 10 reps  Staggered Stance Forward Backward Weight Shift with Unilateral Counter Support - 1 x daily - 7 x weekly - 3 sets - 10 reps  Side to Side Weight Shift with Counter Support - 1 x daily - 7 x weekly - 3 sets - 10 reps      Manual Treatments:         Modalities:  Vaso 10'       Communication with other providers:  POC sent 10/7/22       Assessment:  Pt tolerates tx session well without adverse reactions or complications. Able to achieve 112 deg flexion today, lacking 5 deg. Pt will continue to benefit from PT services with progression of strength/ROM, manual, modalities to return to PLOF. End pain: 0/10    Pt is 46year old male s/p R TKA 10/4/22. Pt now has difficulties completing general mobility, ADLs and transfers. Pt demo deficits this date that include knee ROM, quad activation, gait mechanics, effusion and pain. Pt will benefit with PT services with progression of strength/ROM, manual, modalities to return to PLOF. Pt prior to onset of current condition had min/no pain with able to complete full ADLs and work activities. Patient received education on their current pathology and how their condition effects them with their functional activities. Patient understood discussion and questions were answered. Patient understands their activity limitations and understands rational for treatment progression. Plan for Next Session:  functional progression as tolerated.         Time In / Time Out:    1300 / 1350      Timed Code/Total Treatment Minutes:     36' / 50'    2 TE    1 TA    1 Vaso      Treatment Charges: Mins Units   [] Evaluation       []  Low       []  Moderate       []  High       []  Modalities     [x]  Ther Exercise 25 2    []  Manual Therapy     [x] Ther Activities 15 1   []  Aquatics     [x]  Vasocompression 10 1   []  Other gait         Next Progress Note due:  Re-Eval 10/7/22            Plan of Care Interventions:  [x] Therapeutic Exercise  [x] Modalities:  [x] Therapeutic Activity     [] Ultrasound  [x] Estim  [x] Gait Training      [] Cervical Traction [] Lumbar Traction  [x] Neuromuscular Re-education    [x] Cold/hotpack [] Iontophoresis   [x] Instruction in HEP      [x] Vasopneumatic   [x] Dry Needling    [x] Manual Therapy               [] Aquatic Therapy              Electronically signed by:  Man Wiley PTA  10/24/2022 1:04 PM

## 2022-10-24 NOTE — PROGRESS NOTES
Date of surgery:   October 4th   Surgeon: Dr. Teddy Olszewski    History:  Mr. Jenny Ramos is here in follow up regarding his right total knee arthroplasty. He is doing rather well. He is having 6-7/10 pain. He denies chest pain, SOB, calf pain,fever,wound drainage, tenderness in the calf, and ankle swelling. No other issues. Physical:  Vitals:    10/24/22 1400 10/24/22 1406   BP: 124/80    Site: Left Upper Arm    Position: Sitting    Cuff Size: Medium Adult    Pulse: (!) 105 (!) 102   Resp: 16    Temp: 97 °F (36.1 °C)    TempSrc: Temporal    SpO2: 98%    Weight: 232 lb (105.2 kg)    Height: 5' 10\" (1.778 m)        Right knee:  Prineo dressing is in place. No erythema around the incision or around the dressing. Range of motion active extension to approximately 30 degrees short of full extension with passive extension to approximately 5 degrees  Flexion: 100 degrees  No significant joint effusion  No varus or valgus instability. Very mild edema in the right lower extremity    Impression: Status post above, doing well       Plan:   Patient Instructions   Continue doing physical therapy  Continue using walker or cane  Continue weight bear as tolerated  Continue range of motion and exercises as instruction  Ice and elevate as needed  Tylenol or Motrin for pain  Follow up in 3 weeks with Dr. Teddy Olszewski    We are committed to providing you the best care possible. If you receive a survey after visiting one of our offices, please take time to share your experience concerning your physician office visit. These surveys are confidential and no health information about you is shared. We are eager to improve for you and we are counting on your feedback to help make that happen.

## 2022-10-24 NOTE — PATIENT INSTRUCTIONS
Continue doing physical therapy  Continue using walker or cane  Continue weight bear as tolerated  Continue range of motion and exercises as instruction  Ice and elevate as needed  Tylenol or Motrin for pain  Follow up in 3 weeks with Dr. Shayla Rojas    We are committed to providing you the best care possible. If you receive a survey after visiting one of our offices, please take time to share your experience concerning your physician office visit. These surveys are confidential and no health information about you is shared. We are eager to improve for you and we are counting on your feedback to help make that happen.

## 2022-10-28 ENCOUNTER — HOSPITAL ENCOUNTER (OUTPATIENT)
Dept: PHYSICAL THERAPY | Age: 52
Setting detail: THERAPIES SERIES
Discharge: HOME | End: 2022-10-28
Payer: COMMERCIAL

## 2022-10-28 PROCEDURE — 97110 THERAPEUTIC EXERCISES: CPT

## 2022-10-28 PROCEDURE — 97530 THERAPEUTIC ACTIVITIES: CPT

## 2022-10-28 PROCEDURE — 97016 VASOPNEUMATIC DEVICE THERAPY: CPT

## 2022-10-28 NOTE — FLOWSHEET NOTE
Outpatient Physical Therapy  [place]           [x] Phone: [phone]   Fax: [phone]  Teresita Pearce           [] Phone: [phone]   Fax: [phone]        Physical Therapy Daily Treatment Note  Date:  10/28/2022    Patient Name:  Roc Sanchez    :  1970  MRN: [id number]  Restrictions/Precautions: Restrictions/Precautions: Weight Bearing As Tolerated   Diagnosis:    R TKA  Primary osteoarthritis of right knee [M17.11]  Chronic pain of right knee [M25.561, G89.29]    Date of Injury/Surgery: 10/4/2022  Treatment Diagnosis:     Insurance/Certification information:  [facility]   Referring Physician:  Dorian Brown DO     PCP: Razia De Los Santos MD  Next Doctor Visit:    Plan of care signed (Y/N): N, sent 10/7/22    Outcome Measure: LEFS: 780 full function   Visit# / total visits:   Pain level:  Mostly soreness   Goals:     Patient goals:    Short term goals  Time Frame for Short term goals:   Defer to Long Term Goals      Long Term Goals  Time Frame for Long Term Goals:        Long Term Goals: 6 weeks   Long Term Goal 1: Pt will demo I with HEP/symptom management. Long Term Goal 2: Pt will demo normal gait mechanics with min/no deficits to ease community mobility. Long Term Goal 3: Pt will demo 0-120 deg knee A/PROM to ease transfers. Long Term Goal 4: Pt will completed TUG <12 sec to demo improved mobility. Long Term Goal 5: Pt will demo >50/80 improvement per LEFS to demo improved functional mobility. Long Term Goal 6: Pt will demo 5x sit to stand <15 sec to demo improved LE strength and ease with transfers. Summary of Evaluation:   Pt is 46year old male s/p R TKA 10/4/22. Pt now has difficulties completing general mobility, ADLs and transfers. Pt demo deficits this date that include knee ROM, quad activation, gait mechanics, effusion and pain. Pt will benefit with PT services with progression of strength/ROM, manual, modalities to return to PLOF.  Pt prior to onset of current condition had min/no pain with able to complete full ADLs and work activities. Patient received education on their current pathology and how their condition effects them with their functional activities. Patient understood discussion and questions were answered. Patient understands their activity limitations and understands rational for treatment progression. Subjective:   Pt arrives to tx session reporting mostly soreness in R knee. Any changes in Ambulatory Summary Sheet? None      Objective:     COVID screening questions were asked and patient attested that there had been no contact or symptoms    Lacking 6 deg ext   Flexion 115 deg    Exercises: (No more than 4 columns)   Exercise/Equipment 10/17/22 #5 10/21/22 #6 10/24/22 #7 10/28/22 #8            WARM UP          Nu step  5' L4 5' L4 5' 5' L4   Recumbent bike        Star trac       TABLE       Manual therapy See below See below     *Quad set        *Ankle pumps       *Heel prop   4' 8# 4' 8# 4' 8#   *Sitting AAROM knee flexion stretch  2x10 heel slide 2x10 heel slide 2x10 heel slide 2x10 heel slide      Bridge  2x10 2x10 2x10 2x10   LAQ 2x10 2x10 2x10 2x10   Heel slides w/ strap 2x10 2x10 2x10 2x10   SLR 2x10 2x10 2x10 2x10                        STANDING       LE marches with FWW       Step taps        Stair training with and without cane and use of rail  6\" x4 laps      Amb w/ SPC  100' x2      HR   2x10 2x10   Marching   2x10 2x10   Step up    2x10 4\"   TRX squats    x10          PROPRIOCEPTION       Wobbleboard     30\" ea dir                               MODALITIES       Vaso  10' 10' 10 10'              Other Therapeutic Activities/Education:  --      Home Exercise Program:      Access Code: [email or web address]  URL: [email or web address]  Date: 09/20/2022  Prepared by:  Alhaji Prescott    Exercises  Clamshell - 1 x daily - 7 x weekly - 3 sets - 10 reps  Supine Active Straight Leg Raise - 1 x daily - 7 x weekly - 3 sets - 10 reps  Prone Hip Extension - 1 x daily - 7 x weekly - 3 sets - 10 reps      Access Code: Jayesh Rodriguez  URL: [email or web address]/  Date: 09/20/2022  Prepared by: Oscar Vera    Exercises  Supine Heel Slide with Strap - 1 x daily - 7 x weekly - 3 sets - 10 reps  Long Sitting Quad Set with Towel Roll Under Heel - 1 x daily - 7 x weekly - 3 sets - 10 reps  Supine Ankle Pumps - 1 x daily - 7 x weekly - 3 sets - 10 reps  Staggered Stance Forward Backward Weight Shift with Unilateral Counter Support - 1 x daily - 7 x weekly - 3 sets - 10 reps  Side to Side Weight Shift with Counter Support - 1 x daily - 7 x weekly - 3 sets - 10 reps      Manual Treatments:         Modalities:  Vaso 10'       Communication with other providers:  POC sent 10/7/22       Assessment:  Pt tolerates tx session well without adverse reactions or complications. Able to achieve 115 deg flexion today, lacking 6 deg. Pt will continue to benefit from PT services with progression of strength/ROM, manual, modalities to return to PLOF. End pain: 6/10 after vaso      Pt is 46year old male s/p R TKA 10/4/22. Pt now has difficulties completing general mobility, ADLs and transfers. Pt demo deficits this date that include knee ROM, quad activation, gait mechanics, effusion and pain. Pt will benefit with PT services with progression of strength/ROM, manual, modalities to return to PLOF. Pt prior to onset of current condition had min/no pain with able to complete full ADLs and work activities. Patient received education on their current pathology and how their condition effects them with their functional activities. Patient understood discussion and questions were answered. Patient understands their activity limitations and understands rational for treatment progression. Plan for Next Session:  functional progression as tolerated.         Time In / Time Out:     1516 / 1606      Timed Code/Total Treatment Minutes:     36' / 50'    2 TE    1 TA    1 Vaso Treatment Charges: Mins Units   [] Evaluation       []  Low       []  Moderate       []  High       []  Modalities     [x]  Ther Exercise 25 2    []  Manual Therapy     [x]  Ther Activities 15 1   []  Aquatics     [x]  Vasocompression 10 1   []  Other gait         Next Progress Note due:  Re-Eval 10/7/22            Plan of Care Interventions:  [x] Therapeutic Exercise  [x] Modalities:  [x] Therapeutic Activity     [] Ultrasound  [x] Estim  [x] Gait Training      [] Cervical Traction [] Lumbar Traction  [x] Neuromuscular Re-education    [x] Cold/hotpack [] Iontophoresis   [x] Instruction in HEP      [x] Vasopneumatic   [x] Dry Needling    [x] Manual Therapy               [] Aquatic Therapy              Electronically signed by:  Marty Diggs PTA  10/28/2022 3:17 PM

## 2022-10-31 ENCOUNTER — HOSPITAL ENCOUNTER (OUTPATIENT)
Dept: PHYSICAL THERAPY | Age: 52
Setting detail: THERAPIES SERIES
Discharge: HOME | End: 2022-10-31
Payer: COMMERCIAL

## 2022-10-31 PROCEDURE — 97110 THERAPEUTIC EXERCISES: CPT

## 2022-10-31 PROCEDURE — 97140 MANUAL THERAPY 1/> REGIONS: CPT

## 2022-10-31 NOTE — FLOWSHEET NOTE
Outpatient Physical Therapy  [place]           [x] Phone: [phone]   Fax: [phone]  [facility]           [] Phone: [phone]   Fax: [phone]        Physical Therapy Daily Treatment Note  Date:  10/31/2022    Patient Name:  Shannon Mendoza    :  1970  MRN: [id number]  Restrictions/Precautions: Restrictions/Precautions: Weight Bearing As Tolerated   Diagnosis:    R TKA  Primary osteoarthritis of right knee [M17.11]  Chronic pain of right knee [M25.561, G89.29]    Date of Injury/Surgery: 10/4/2022  Treatment Diagnosis:     Insurance/Certification information:  [facility]   Referring Physician:  Kalee Rm DO     PCP: Anne Mclain MD  Next Doctor Visit:    Plan of care signed (Y/N): N, sent 10/7/22    Outcome Measure: LEFS:  full function   Visit# / total visits:   Pain level:  Mostly soreness   Goals:     Patient goals:    Short term goals  Time Frame for Short term goals:   Defer to Long Term Goals      Long Term Goals  Time Frame for Long Term Goals:        Long Term Goals: 6 weeks   Long Term Goal 1: Pt will demo I with HEP/symptom management. Long Term Goal 2: Pt will demo normal gait mechanics with min/no deficits to ease community mobility. Long Term Goal 3: Pt will demo 0-120 deg knee A/PROM to ease transfers. Long Term Goal 4: Pt will completed TUG <12 sec to demo improved mobility. Long Term Goal 5: Pt will demo >50/80 improvement per LEFS to demo improved functional mobility. Long Term Goal 6: Pt will demo 5x sit to stand <15 sec to demo improved LE strength and ease with transfers. Summary of Evaluation:   Pt is 46year old male s/p R TKA 10/4/22. Pt now has difficulties completing general mobility, ADLs and transfers. Pt demo deficits this date that include knee ROM, quad activation, gait mechanics, effusion and pain. Pt will benefit with PT services with progression of strength/ROM, manual, modalities to return to PLOF.  Pt prior to onset of current condition had min/no pain with able to complete full ADLs and work activities. Patient received education on their current pathology and how their condition effects them with their functional activities. Patient understood discussion and questions were answered. Patient understands their activity limitations and understands rational for treatment progression. Subjective:   Patient states continued soreness especially following exercises. Pain: 5/10      Any changes in Ambulatory Summary Sheet? None      Objective:     COVID screening questions were asked and patient attested that there had been no contact or symptoms    Lacking 6 deg ext   Flexion 115 deg    Exercises: (No more than 4 columns)   Exercise/Equipment 10/17/22 #5 10/21/22 #6 10/24/22 #7 10/28/22 #8 10/21/22 #9             WARM UP           Nu step  5' L4 5' L4 5' 5' L4 L5 5'   Recumbent bike         Star trac        TABLE        Manual therapy See below See below      *Quad set         *Ankle pumps        *Heel prop   4' 8# 4' 8# 4' 8# 3' 3#   *Sitting AAROM knee flexion stretch  2x10 heel slide 2x10 heel slide 2x10 heel slide 2x10 heel slide       Bridge  2x10 2x10 2x10 2x10    LAQ 2x10 2x10 2x10 2x10    Heel slides w/ strap 2x10 2x10 2x10 2x10    SLR 2x10 2x10 2x10 2x10    HS stretch     2 x 30\"   Prone quad stretch     3 x 30\"   Shuttle press      2 x 10 2c           STANDING        LE marches with FWW        Step taps         Stair training with and without cane and use of rail  6\" x4 laps       Amb w/ SPC  100' x2       Slant board gastroc stretch     2 x 30\"   HR   2x10 2x10    Marching   2x10 2x10    Step up    2x10 4\"    TRX squats    x10            PROPRIOCEPTION        Wobbleboard     30\" ea dir                                    MODALITIES        Vaso  10' 10' 10 10'                Other Therapeutic Activities/Education:  --      Home Exercise Program:      Access Code: [email or web address]  URL: [email or web address]/  Date: 09/20/2022  Prepared by: Lelo Ellison    Exercises  Clamshell - 1 x daily - 7 x weekly - 3 sets - 10 reps  Supine Active Straight Leg Raise - 1 x daily - 7 x weekly - 3 sets - 10 reps  Prone Hip Extension - 1 x daily - 7 x weekly - 3 sets - 10 reps      Access Code: Lizett Norman  URL: [email or web address]  Date: 09/20/2022  Prepared by: [place]    Exercises  Supine Heel Slide with Strap - 1 x daily - 7 x weekly - 3 sets - 10 reps  Long Sitting Quad Set with Towel Roll Under Heel - 1 x daily - 7 x weekly - 3 sets - 10 reps  Supine Ankle Pumps - 1 x daily - 7 x weekly - 3 sets - 10 reps  Staggered Stance Forward Backward Weight Shift with Unilateral Counter Support - 1 x daily - 7 x weekly - 3 sets - 10 reps  Side to Side Weight Shift with Counter Support - 1 x daily - 7 x weekly - 3 sets - 10 reps      Manual Treatments:         Modalities:  Vaso 10'       Communication with other providers:  POC sent 10/7/22       Assessment:  Patient continues to demonstrate limited flexion and extension ROM. Completed IASTM to the distal quad, distal HS and proximal gastroc followed by TF anterior and posterior mobilizations (grade III). Following manual completed stretches as listed above with fair tolerance. Completed light strengthening through available ROM due to soreness. Will continue to progress as tolerated. End pain: 6/10      Pt is 46year old male s/p R TKA 10/4/22. Pt now has difficulties completing general mobility, ADLs and transfers. Pt demo deficits this date that include knee ROM, quad activation, gait mechanics, effusion and pain. Pt will benefit with PT services with progression of strength/ROM, manual, modalities to return to PLOF. Pt prior to onset of current condition had min/no pain with able to complete full ADLs and work activities. Patient received education on their current pathology and how their condition effects them with their functional activities.  Patient understood discussion and questions were answered. Patient understands their activity limitations and understands rational for treatment progression. Plan for Next Session:  functional progression as tolerated. Time In / Time Out:  1630/1715 Man x 2 TE x 1       Timed Code/Total Treatment Minutes:         Treatment Charges: Mins Units   [] Evaluation       []  Low       []  Moderate       []  High       []  Modalities     [x]  Ther Exercise 20 1   []  Manual Therapy 25 2   [x]  Ther Activities     []  Aquatics     [x]  Vasocompression     []  Other gait 45 3       Next Progress Note due:  Re-Eval 10/7/22            Plan of Care Interventions:  [x] Therapeutic Exercise  [x] Modalities:  [x] Therapeutic Activity     [] Ultrasound  [x] Estim  [x] Gait Training      [] Cervical Traction [] Lumbar Traction  [x] Neuromuscular Re-education    [x] Cold/hotpack [] Iontophoresis   [x] Instruction in HEP      [x] Vasopneumatic   [x] Dry Needling    [x] Manual Therapy               [] Aquatic Therapy              Electronically signed by:   Garrick Bumpers, PT, DPT  10/31/2022 4:29 PM

## 2022-11-02 ENCOUNTER — HOSPITAL ENCOUNTER (OUTPATIENT)
Dept: PHYSICAL THERAPY | Age: 52
Setting detail: THERAPIES SERIES
Discharge: HOME | End: 2022-11-02
Payer: COMMERCIAL

## 2022-11-02 PROCEDURE — 97110 THERAPEUTIC EXERCISES: CPT

## 2022-11-02 NOTE — FLOWSHEET NOTE
Outpatient Physical Therapy  [place]           [x] Phone: [phone]   Fax: [phone]  Chintan Rater           [] Phone: [phone]   Fax: [phone]        Physical Therapy Daily Treatment Note  Date:  2022    Patient Name:  Basilia Cintron    :  1970  MRN: [id number]  Restrictions/Precautions: Restrictions/Precautions: Weight Bearing As Tolerated   Diagnosis:    R TKA  Primary osteoarthritis of right knee [M17.11]  Chronic pain of right knee [M25.561, G89.29]    Date of Injury/Surgery: 10/4/2022  Treatment Diagnosis:     Insurance/Certification information:  [facility]   Referring Physician:  Mackenzie Espinosa DO     PCP: Nehal Yoon MD  Next Doctor Visit:    Plan of care signed (Y/N): Y  Outcome Measure: LEFS:  full function   Visit# / total visits: 10/12  Pain level:  8/10     Goals:     Patient goals:    Short term goals  Time Frame for Short term goals:   Defer to Long Term Goals      Long Term Goals  Time Frame for Long Term Goals:        Long Term Goals: 6 weeks   Long Term Goal 1: Pt will demo I with HEP/symptom management. Long Term Goal 2: Pt will demo normal gait mechanics with min/no deficits to ease community mobility. Long Term Goal 3: Pt will demo 0-120 deg knee A/PROM to ease transfers. 6-115 10/31/22  Long Term Goal 4: Pt will completed TUG <12 sec to demo improved mobility. Long Term Goal 5: Pt will demo >50/80 improvement per LEFS to demo improved functional mobility. Long Term Goal 6: Pt will demo 5x sit to stand <15 sec to demo improved LE strength and ease with transfers. Summary of Evaluation:   Pt is 46year old male s/p R TKA 10/4/22. Pt now has difficulties completing general mobility, ADLs and transfers. Pt demo deficits this date that include knee ROM, quad activation, gait mechanics, effusion and pain. Pt will benefit with PT services with progression of strength/ROM, manual, modalities to return to PLOF.  Pt prior to onset of current condition had min/no pain with able to complete full ADLs and work activities. Patient received education on their current pathology and how their condition effects them with their functional activities. Patient understood discussion and questions were answered. Patient understands their activity limitations and understands rational for treatment progression. Subjective:   Patient states is a 8/10 today. Anna Velazquez reports that his tx on Monday caused an increase of pain. Pain is more constant, sharp pain. Any changes in Ambulatory Summary Sheet? None      Objective:   COVID screening questions were asked and patient attested that there had been no contact or symptoms    VC to control eccentric lower on HR  Lacking knee ext on HR stretch  Slight pain with weightbearing though RLE  TTP tibial tuberosity but not in patellar tendon.        Exercises: (No more than 4 columns)   Exercise/Equipment 10/24/22 #7 10/28/22 #8 10/31/22 #9 11/02/22 #10            WARM UP          Nu step  5' 5' L4 L5 5' L5 5'   Recumbent bike        Star trac       TABLE       Manual therapy       *Quad set        *Ankle pumps       *Heel prop  4' 8# 4' 8# 3' 3# 3' 3#   *Sitting AAROM knee flexion stretch  2x10 heel slide 2x10 heel slide  PROM stretch into flex   Bridge  2x10 2x10     LAQ 2x10 2x10     Heel slides w/ strap 2x10 2x10     SLR 2x10 2x10     HS stretch   2 x 30\" 2 x 30\"   Prone quad stretch   3 x 30\" 3 x 30\"   Shuttle press    2 x 10 2c 2 x 10 2c          STANDING       LE marches with FWW       Step taps        Stair training with and without cane and use of rail       Amb w/ SPC       Slant board gastroc stretch   2 x 30\" 2 x 30\"   HR 2x10 2x10  2x10   Marching 2x10 2x10  2x10   Step up  2x10 4\"  2x10 4\"   TRX squats  x10            PROPRIOCEPTION       Wobbleboard   30\" ea dir  30\" ea dir 1 finger                               MODALITIES       Vaso  10 10'  --              Other Therapeutic Activities/Education:  --      Home Exercise Program:      Access Code: [email or web address]  URL: [email or web address]  Date: 09/20/2022  Prepared by: Larry Toure    Exercises  Clamshell - 1 x daily - 7 x weekly - 3 sets - 10 reps  Supine Active Straight Leg Raise - 1 x daily - 7 x weekly - 3 sets - 10 reps  Prone Hip Extension - 1 x daily - 7 x weekly - 3 sets - 10 reps      Access Code: Cathy Li  URL: [email or web address]  Date: 09/20/2022  Prepared by: Larry Toure    Exercises  Supine Heel Slide with Strap - 1 x daily - 7 x weekly - 3 sets - 10 reps  Long Sitting Quad Set with Towel Roll Under Heel - 1 x daily - 7 x weekly - 3 sets - 10 reps  Supine Ankle Pumps - 1 x daily - 7 x weekly - 3 sets - 10 reps  Staggered Stance Forward Backward Weight Shift with Unilateral Counter Support - 1 x daily - 7 x weekly - 3 sets - 10 reps  Side to Side Weight Shift with Counter Support - 1 x daily - 7 x weekly - 3 sets - 10 reps      Manual Treatments:   PROM as above       Modalities:      Communication with other providers:  POC sent 10/7/22       Assessment:  Isra Mckinnon tolerated today's session well despite reports of high pain levels upon arrival. His ROM is doing well for stage of healing but he is still lacking full terminal knee extension. Strength appears to be improving as exercises today seemed easier than in previous sessions. Following manual stretches listed above, pt reports a decrease in pain. Isra Mckinnon still has difficulty with weightbearing onto RLE. End pain: 6/10        Plan for Next Session:  functional progression as tolerated.         Time In / Time Out:  1650/1727      Timed Code/Total Treatment Minutes:     37'/37'  TE 2    Treatment Charges: Mins Units   [] Evaluation       []  Low       []  Moderate       []  High       []  Modalities     [x]  Ther Exercise     []  Manual Therapy     [x]  Ther Activities     []  Aquatics     [x]  Vasocompression     []  Other gait         Next Progress Note due:  Re-Eval 10/7/22            Plan of Care Interventions:  [x] Therapeutic Exercise  [x] Modalities:  [x] Therapeutic Activity     [] Ultrasound  [x] Estim  [x] Gait Training      [] Cervical Traction [] Lumbar Traction  [x] Neuromuscular Re-education    [x] Cold/hotpack [] Iontophoresis   [x] Instruction in HEP      [x] Vasopneumatic   [x] Dry Needling    [x] Manual Therapy               [] Aquatic Therapy              Electronically signed by:  BLAIR Schumacher  11/2/2022 9:23 AM     Lita Shea PTA

## 2022-11-07 ENCOUNTER — HOSPITAL ENCOUNTER (OUTPATIENT)
Dept: PHYSICAL THERAPY | Age: 52
Setting detail: THERAPIES SERIES
Discharge: HOME | End: 2022-11-07
Payer: COMMERCIAL

## 2022-11-07 PROCEDURE — 97016 VASOPNEUMATIC DEVICE THERAPY: CPT

## 2022-11-07 PROCEDURE — 97140 MANUAL THERAPY 1/> REGIONS: CPT

## 2022-11-07 PROCEDURE — 97110 THERAPEUTIC EXERCISES: CPT

## 2022-11-07 NOTE — FLOWSHEET NOTE
Outpatient Physical Therapy  [place]           [x] Phone: [phone]   Fax: [phone]  Martha Gentile           [] Phone: [phone]   Fax: [phone]        Physical Therapy Daily Treatment Note  Date:  2022    Patient Name:  Cristofer Funez    :  1970  MRN: [id number]  Restrictions/Precautions: Restrictions/Precautions: Weight Bearing As Tolerated   Diagnosis:    R TKA  Primary osteoarthritis of right knee [M17.11]  Chronic pain of right knee [M25.561, G89.29]    Date of Injury/Surgery: 10/4/2022  Treatment Diagnosis:     Insurance/Certification information:  [facility]   Referring Physician:  Del Ariza DO     PCP: Damien Cerda MD  Next Doctor Visit:    Plan of care signed (Y/N): Y  Outcome Measure: LEFS:  full function   Visit# / total visits:   Pain level:  4-5/10     Goals:     Patient goals:    Short term goals  Time Frame for Short term goals:   Defer to Long Term Goals      Long Term Goals  Time Frame for Long Term Goals:        Long Term Goals: 6 weeks   Long Term Goal 1: Pt will demo I with HEP/symptom management. Long Term Goal 2: Pt will demo normal gait mechanics with min/no deficits to ease community mobility. Long Term Goal 3: Pt will demo 0-120 deg knee A/PROM to ease transfers. 6-115 10/31/22  Long Term Goal 4: Pt will completed TUG <12 sec to demo improved mobility. Long Term Goal 5: Pt will demo >50/80 improvement per LEFS to demo improved functional mobility. Long Term Goal 6: Pt will demo 5x sit to stand <15 sec to demo improved LE strength and ease with transfers. Summary of Evaluation:   Pt is 46year old male s/p R TKA 10/4/22. Pt now has difficulties completing general mobility, ADLs and transfers. Pt demo deficits this date that include knee ROM, quad activation, gait mechanics, effusion and pain. Pt will benefit with PT services with progression of strength/ROM, manual, modalities to return to PLOF.  Pt prior to onset of current condition had min/no pain with able to complete full ADLs and work activities. Patient received education on their current pathology and how their condition effects them with their functional activities. Patient understood discussion and questions were answered. Patient understands their activity limitations and understands rational for treatment progression. Subjective:   Patient states is a 4-5/10 pain today on the top of his knee. Luis M Best reports a decrease in pain since last session. Pt states that he felt his knee \"moving\" in the middle of the night. Any changes in Ambulatory Summary Sheet? None      Objective:   COVID screening questions were asked and patient attested that there had been no contact or symptoms      Lacking knee ext on HS stretch  Slight pain with weightbearing though RLE  Lacking 5 degrees from neutral   Tightness with manual prone quad stretch  1 finger needed to help balance on wobble board both directions  Good tib/fem mobility.        Exercises: (No more than 4 columns)   Exercise/Equipment 10/28/22 #8 10/31/22 #9 11/02/22 #10 11/07/22 #11            WARM UP          Nu step  5' L4 L5 5' L5 5' L5 5'   Recumbent bike        Star trac       TABLE       Manual therapy       *Quad set        *Ankle pumps       *Heel prop  4' 8# 3' 3# 3' 3# 3' 3#   *Sitting AAROM knee flexion stretch  2x10 heel slide  PROM stretch into flex PROM stretch into flex   Bridge  2x10      LAQ 2x10      Heel slides w/ strap 2x10      SLR 2x10      HS stretch  2 x 30\" 2 x 30\" 2x 30 \"   Prone quad stretch  3 x 30\" 3 x 30\" 3X30\"   Shuttle press   2 x 10 2c 2 x 10 2c 2X10 2 c   SAQ     W/ OP at top 10x1          STANDING       LE marches with FWW       Step taps        Stair training with and without cane and use of rail       Amb w/ SPC       Slant board gastroc stretch  2 x 30\" 2 x 30\" 2x30\"   HR 2x10  2x10 2x10   Marching 2x10  2x10 2x10   Step up 2x10 4\"  2x10 4\" 2x10 4\"   TRX squats x10             PROPRIOCEPTION Wobbleboard  30\" ea dir  30\" ea dir 1 finger 30\" ea dir 1 finger                               MODALITIES       Vaso  10'  -- 10'              Other Therapeutic Activities/Education:  Education provided to patient on sleeping arrangements, try not to sleep with pillow under knees and if do, do it vertically. Extension prop multiple times daily. Home Exercise Program:      Access Code: [email or web address]  URL: [employer]  Date: 09/20/2022  Prepared by: Dejah Tadeo    Exercises  Clamshell - 1 x daily - 7 x weekly - 3 sets - 10 reps  Supine Active Straight Leg Raise - 1 x daily - 7 x weekly - 3 sets - 10 reps  Prone Hip Extension - 1 x daily - 7 x weekly - 3 sets - 10 reps      Access Code: Aj Bell  URL: [employer]  Date: 09/20/2022  Prepared by: Dejah Tadeo    Exercises  Supine Heel Slide with Strap - 1 x daily - 7 x weekly - 3 sets - 10 reps  Long Sitting Quad Set with Towel Roll Under Heel - 1 x daily - 7 x weekly - 3 sets - 10 reps  Supine Ankle Pumps - 1 x daily - 7 x weekly - 3 sets - 10 reps  Staggered Stance Forward Backward Weight Shift with Unilateral Counter Support - 1 x daily - 7 x weekly - 3 sets - 10 reps  Side to Side Weight Shift with Counter Support - 1 x daily - 7 x weekly - 3 sets - 10 reps      Manual Treatments:   PROM as above       Modalities:      Communication with other providers:  POC sent 10/7/22       Assessment:  Ana Huang tolerated today's session well with an increase of pain throughout the treatment. Ana Huang is lacking full terminal knee extension and some flexion. Strength appears to be improving as exercises today seemed easier than in previous sessions. Following manual stretches listed above, pt reports a decrease in pain. End pain: 5/10 before ice        Plan for Next Session:  functional progression as tolerated.         Time In / Time Out:  1630/1725      Timed Code/Total Treatment Minutes:  45'/55'    1 vaso 1 man 2 TE    Treatment Charges: Mins Units   [] Evaluation       []  Low       []  Moderate       []  High       []  Modalities     [x]  Ther Exercise     []  Manual Therapy     [x]  Ther Activities     []  Aquatics     [x]  Vasocompression     []  Other gait         Next Progress Note due:  Re-Eval 10/7/22            Plan of Care Interventions:  [x] Therapeutic Exercise  [x] Modalities:  [x] Therapeutic Activity     [] Ultrasound  [x] Estim  [x] Gait Training      [] Cervical Traction [] Lumbar Traction  [x] Neuromuscular Re-education    [x] Cold/hotpack [] Iontophoresis   [x] Instruction in HEP      [x] Vasopneumatic   [x] Dry Needling    [x] Manual Therapy               [] Aquatic Therapy              Electronically signed by:  BLAIR Harris  11/7/2022 9:19 AM     Ricardo Pfeiffer PTA

## 2022-11-09 ENCOUNTER — HOSPITAL ENCOUNTER (OUTPATIENT)
Dept: PHYSICAL THERAPY | Age: 52
Setting detail: THERAPIES SERIES
Discharge: HOME | End: 2022-11-09
Payer: COMMERCIAL

## 2022-11-09 PROCEDURE — 97112 NEUROMUSCULAR REEDUCATION: CPT

## 2022-11-09 PROCEDURE — 97530 THERAPEUTIC ACTIVITIES: CPT

## 2022-11-09 PROCEDURE — 97110 THERAPEUTIC EXERCISES: CPT

## 2022-11-09 NOTE — PROGRESS NOTES
Outpatient Physical Therapy           [place]           [x] Phone: [phone]   Fax: [phone]  [place] Tommaritza           [] Phone: [phone]   Fax: [phone]      To: Rosaline Magallon DO     From: Maria Eugenia Schaefer PT, PT     Patient: Andrew Cho                    : 1970  Diagnosis:  Primary osteoarthritis of right knee [M17.11]  Chronic pain of right knee [M25.561, G89.29]        Treatment Diagnosis:       Date: 2022  [x]  Progress Note                []  Discharge Note    Evaluation Date:  22  Total Visits to date:  12  Cancels/No-shows to date:  0    Subjective:  Patient states that his knee continues to have some pain. ROM deficits make it difficult to complete tasks at home. Plan of Care/Treatment to date:  [x] Therapeutic Exercise    [] Modalities:  [x] Therapeutic Activity     [] Ultrasound  [] Electrical Stimulation  [x] Gait Training      [] Cervical Traction   [] Lumbar Traction  [x] Neuromuscular Re-education  [x] Cold/hotpack [] Iontophoresis  [x] Instruction in HEP      Other:  [x] Manual Therapy       [x]  Vasopneumatic  [] Aquatic Therapy       [x]   Dry Needle Therapy                      Objective/Significant Findings At Last Visit/Comments:      Right AROM      AROM RLE (degrees)  R Knee Flexion 0-145: 100  R Knee Extension 0: 10      Right PROM      General PROM LE: Other (comment) (Limited internal rotation bilaterally)  PROM RLE (degrees)  R Knee Flexion 0-145: 100  R Knee Extension 0: 8        Assessment:  Patient would benefit from skilled physical therapy services in order to: The patient is a 47 y/o male that originally presented to therapy following a right TKE completed on 10/4/22 by Dr. Marylene Cheadle. The patient presents with improvements in ROM and LE strength since evaluation. The patient continues to have difficulty with knee flexion ROM, knee extension ROM, right quad facilitation, gait dysfunction and activity tolerance.  The patient is behind therapy expectations for a TKA completed on 10/4/22, however is progressing toward therapy goals. The patient will continue to benefit from therapy services to address the above deficits to return to previous level of function. Goal Status:  [] Achieved [] Partially Achieved  [] Not Achieved     Patient goals:    Short term goals  Time Frame for Short term goals:   Defer to Long Term Goals   Long Term Goals  Time Frame for Long Term Goals:     Long Term Goals: 6 weeks   Long Term Goal 1: Pt will demo I with HEP/symptom management. Met  Long Term Goal 2: Pt will demo normal gait mechanics with min/no deficits to ease community mobility. Not Met: decreased right TKE with right antalgia  Long Term Goal 3: Pt will demo 0-120 deg knee A/PROM to ease transfers. Not Met  Long Term Goal 4: Pt will completed TUG <12 sec to demo improved mobility. Not Met: 12.9 seconds   Long Term Goal 5: Pt will demo >50/80 improvement per LEFS to demo improved functional mobility. Not Met: 27 (67%)  Long Term Goal 6: Pt will demo 5x sit to stand <15 sec to demo improved LE strength and ease with transfers. Not Met: 15.8 seconds         Frequency/Duration:  # Days per week: [] 1 day # Weeks: [] 1 week [] 4 weeks [] 8 weeks     [x] 2 days   [] 2 weeks [] 5 weeks [x] 10 weeks     [] 3 days   [] 3 weeks [] 6 weeks [] 12 weeks       Rehab Potential: [] Excellent [] Good [] Fair  [] Poor         Patient Status: [] Continue per initial plan of Care     [] Patient now discharged     [x] Additional visits requested, Please re-certify for additional visits:      Requested frequency/duration:  2/week for 4 weeks    If we are requesting more visits, we fully anticipate the patient's condition is expected to improve within the treatment timeframe we are requesting. Electronically signed by: Larry Toure PT, DPT, 11/9/2022, 6:05 PM    If you have any questions or concerns, please don't hesitate to call.   Thank you for your referral.    Physician Signature:______________________ Date:______ Time: ________  By signing above, therapists plan is approved by physician

## 2022-11-10 NOTE — FLOWSHEET NOTE
Outpatient Physical Therapy  [place]           [x] Phone: [phone]   Fax: [phone]  [place]           [] Phone: [phone]   Fax: [phone]        Physical Therapy Daily Treatment Note  Date:  2022    Patient Name:  Hannah Carrillo    :  1970  MRN: [id number]  Restrictions/Precautions: Restrictions/Precautions: Weight Bearing As Tolerated   Diagnosis:    R TKA  Primary osteoarthritis of right knee [M17.11]  Chronic pain of right knee [M25.561, G89.29]    Date of Injury/Surgery: 10/4/2022  Treatment Diagnosis:     Insurance/Certification information:  [facility]   Referring Physician:  Ranjan Johnston DO     PCP: Wilfredo Shelton MD  Next Doctor Visit:    Plan of care signed (Y/N): Y  Outcome Measure: LEFS:  full function   Visit# / total visits:   Pain level:  4-510     Goals:     Patient goals:    Long Term Goals  Time Frame for Long Term Goals:     Long Term Goals: 6 weeks   Long Term Goal 1: Pt will demo I with HEP/symptom management. Met  Long Term Goal 2: Pt will demo normal gait mechanics with min/no deficits to ease community mobility. Not Met: decreased right TKE with right antalgia  Long Term Goal 3: Pt will demo 0-120 deg knee A/PROM to ease transfers. Not Met  Long Term Goal 4: Pt will completed TUG <12 sec to demo improved mobility. Not Met: 12.9 seconds   Long Term Goal 5: Pt will demo >50/80 improvement per LEFS to demo improved functional mobility. Not Met: 27 (67%)  Long Term Goal 6: Pt will demo 5x sit to stand <15 sec to demo improved LE strength and ease with transfers. Not Met: 15.8 seconds       Summary of Evaluation:   Pt is 46year old male s/p R TKA 10/4/22. Pt now has difficulties completing general mobility, ADLs and transfers. Pt demo deficits this date that include knee ROM, quad activation, gait mechanics, effusion and pain.  Pt will benefit with PT services with progression of strength/ROM, manual, modalities to return to PLOF. Pt prior to onset of current condition had min/no pain with able to complete full ADLs and work activities. Patient received education on their current pathology and how their condition effects them with their functional activities. Patient understood discussion and questions were answered. Patient understands their activity limitations and understands rational for treatment progression. Subjective:  Patient states that his knee continues to have some pain. ROM deficits make it difficult to complete tasks at home. Any changes in Ambulatory Summary Sheet? None      Objective:   COVID screening questions were asked and patient attested that there had been no contact or symptoms      Lacking knee ext on HS stretch  Slight pain with weightbearing though RLE  Lacking 5 degrees from neutral   Tightness with manual prone quad stretch  1 finger needed to help balance on wobble board both directions  Good tib/fem mobility.        Exercises: (No more than 4 columns)   Exercise/Equipment 11/02/22 #10 11/07/22 #11 11/9/22           WARM UP         Nu step  L5 5' L5 5' L5 5'   Recumbent bike       Star trac      TABLE      Manual therapy      *Quad set    5' NMES   *Ankle pumps      *Heel prop  3' 3# 3' 3#    *Sitting AAROM knee flexion stretch  PROM stretch into flex PROM stretch into flex    Bridge       LAQ      Heel slides w/ strap      SLR      HS stretch 2 x 30\" 2x 30 \"    Prone quad stretch 3 x 30\" 3X30\" 5 x 5\" w/ manual overpressure    Shuttle press  2 x 10 2c 2X10 2 c    SAQ   W/ OP at top 10x1  5' NMES   Knee extension stretch   5 x 5 \" w/ manual overpressure    STANDING      LE marches with FWW      Step taps       Stair training with and without cane and use of rail      Amb w/ SPC      Slant board gastroc stretch 2 x 30\" 2x30\"    HR 2x10 2x10    Marching 2x10 2x10    Step up 2x10 4\" 2x10 4\"    TRX squats            PROPRIOCEPTION      Wobbleboard  30\" ea dir 1 finger 30\" ea dir 1 finger                            MODALITIES      Vaso  -- 10'              Other Therapeutic Activities/Education:  Education provided to patient on sleeping arrangements, try not to sleep with pillow under knees and if do, do it vertically. Extension prop multiple times daily. Home Exercise Program:      Access Code: [email or web address]  URL: [email or web address]  Date: 11/09/2022  Prepared by: Daniel Medina    Exercises  Long Sitting Quad Set with Towel Roll Under Heel - 1 x daily - 7 x weekly - 3 sets - 10 reps - 5\" hold  Supine Knee Extension Strengthening - 1 x daily - 7 x weekly - 3 sets - 10 reps - 5\" hold  Supine Active Straight Leg Raise - 1 x daily - 7 x weekly - 3 sets - 10 reps  Prone Knee Flexion - 1 x daily - 7 x weekly - 3 sets - 10 reps  Seated Knee Flexion Stretch - 1 x daily - 7 x weekly - 3 sets - 30\" hold  Seated Hamstring Stretch - 1 x daily - 7 x weekly - 3 sets - 30\" hold  Prone Quadriceps Stretch with Strap - 1 x daily - 7 x weekly - 3 sets - 30\" hold        Manual Treatments:   PROM as above       Modalities:      Communication with other providers:  POC sent 10/7/22       Assessment:  Patient would benefit from skilled physical therapy services in order to: The patient is a 47 y/o male that originally presented to therapy following a right TKE completed on 10/4/22 by Dr. Nain Ramirez. The patient presents with improvements in ROM and LE strength since evaluation. The patient continues to have difficulty with knee flexion ROM, knee extension ROM, right quad facilitation, gait dysfunction and activity tolerance. The patient is behind therapy expectations for a TKA completed on 10/4/22, however is progressing toward therapy goals. The patient will continue to benefit from therapy services to address the above deficits to return to previous level of function. End pain: 5/10 before ice        Plan for Next Session:  functional progression as tolerated.         Time In / Time Out:  1800 / 1855      Timed Code/Total Treatment Minutes:  45'/55'    1 vaso 1 man 2 TE    Treatment Charges: Mins Units   [] Evaluation       []  Low       []  Moderate       []  High       []  Modalities     [x]  Ther Exercise 15 1   []  Manual Therapy     [x]  Ther Activities 30 2   []  Aquatics     []  Vasocompression     [x]  Other: neuro 10 1       Next Progress Note due:  20th         Plan of Care Interventions:  [x] Therapeutic Exercise  [x] Modalities:  [x] Therapeutic Activity     [] Ultrasound  [x] Estim  [x] Gait Training      [] Cervical Traction [] Lumbar Traction  [x] Neuromuscular Re-education    [x] Cold/hotpack [] Iontophoresis   [x] Instruction in HEP      [x] Vasopneumatic   [x] Dry Needling    [x] Manual Therapy               [] Aquatic Therapy              Electronically signed by:   Larry Toure PT, DPT  11/9/2022 7:02 PM

## 2022-11-14 ENCOUNTER — HOSPITAL ENCOUNTER (OUTPATIENT)
Dept: PHYSICAL THERAPY | Age: 52
Setting detail: THERAPIES SERIES
Discharge: HOME | End: 2022-11-14
Payer: COMMERCIAL

## 2022-11-14 PROCEDURE — 97112 NEUROMUSCULAR REEDUCATION: CPT

## 2022-11-14 PROCEDURE — 97110 THERAPEUTIC EXERCISES: CPT

## 2022-11-14 NOTE — FLOWSHEET NOTE
Outpatient Physical Therapy  [facility]           [x] Phone: [phone]   Fax: [phone]  [place] [facility]           [] Phone: [phone]   Fax: [phone]        Physical Therapy Daily Treatment Note  Date:  2022    Patient Name:  Zaheer Feliciano    :  1970  MRN: [id number]  Restrictions/Precautions: Restrictions/Precautions: Weight Bearing As Tolerated   Diagnosis:    R TKA  Primary osteoarthritis of right knee [M17.11]  Chronic pain of right knee [M25.561, G89.29]    Date of Injury/Surgery: 10/4/2022  Treatment Diagnosis:     Insurance/Certification information:  [facility]   Referring Physician:  Erica Jenkins DO     PCP: Mani Abdi MD  Next Doctor Visit:    Plan of care signed (Y/N): Y  Outcome Measure: LEFS:  full function   Visit# / total visits:   Pain level:  4/10     Goals:     Patient goals:    Long Term Goals  Time Frame for Long Term Goals:     Long Term Goals: 6 weeks   Long Term Goal 1: Pt will demo I with HEP/symptom management. Met  Long Term Goal 2: Pt will demo normal gait mechanics with min/no deficits to ease community mobility. Not Met: decreased right TKE with right antalgia  Long Term Goal 3: Pt will demo 0-120 deg knee A/PROM to ease transfers. Not Met  Long Term Goal 4: Pt will completed TUG <12 sec to demo improved mobility. Not Met: 12.9 seconds   Long Term Goal 5: Pt will demo >50/80 improvement per LEFS to demo improved functional mobility. Not Met: 27 (67%)  Long Term Goal 6: Pt will demo 5x sit to stand <15 sec to demo improved LE strength and ease with transfers. Not Met: 15.8 seconds       Summary of Evaluation:   Pt is 46year old male s/p R TKA 10/4/22. Pt now has difficulties completing general mobility, ADLs and transfers. Pt demo deficits this date that include knee ROM, quad activation, gait mechanics, effusion and pain.  Pt will benefit with PT services with progression of strength/ROM, manual, modalities to return to PLOF. Pt prior to onset of current condition had min/no pain with able to complete full ADLs and work activities. Patient received education on their current pathology and how their condition effects them with their functional activities. Patient understood discussion and questions were answered. Patient understands their activity limitations and understands rational for treatment progression. Subjective:  Patient states that his knee continues to have some pain and is a 4/10 today. Any changes in Ambulatory Summary Sheet?   None      Objective:   COVID screening questions were asked and patient attested that there had been no contact or symptoms      Lacking 5 degrees from neutral   Tightness with manual prone quad stretch  1 finger needed to help balance on wobble board both directions; toe out noted on both feet  Decreased stance on RLE during marches      Exercises: (No more than 4 columns)   Exercise/Equipment 11/02/22 #10 11/07/22 #11 11/9/22 11/14/22 #13            WARM UP          Nu step  L5 5' L5 5' L5 5' L5 5'   Recumbent bike        Star trac       TABLE       Manual therapy       *Quad set    5' NMES 5' NMES   *Ankle pumps       *Heel prop  3' 3# 3' 3#  3'4#   *Sitting AAROM knee flexion stretch  PROM stretch into flex PROM stretch into flex     Bridge        LAQ       Heel slides w/ strap       SLR       HS stretch 2 x 30\" 2x 30 \"     Prone quad stretch 3 x 30\" 3X30\" 5 x 5\" w/ manual overpressure  5 x 5 \" w/ manual overpressure   Shuttle press  2 x 10 2c 2X10 2 c     SAQ   W/ OP at top 10x1  5' NMES 5' NMES   Knee extension stretch   5 x 5 \" w/ manual overpressure  5 x 5 \" w/ manual overpressure   STANDING       LE marches with FWW       Step taps        Stair training with and without cane and use of rail       Amb w/ SPC       Slant board gastroc stretch 2 x 30\" 2x30\"  --   HR 2x10 2x10  --   Marching 2x10 2x10  3x10   Step up 2x10 4\" 2x10 4\" 2x10 6\"   TRX squats       Retro walking FM    4 laps 7#          PROPRIOCEPTION       Wobbleboard  30\" ea dir 1 finger 30\" ea dir 1 finger  30\" ea dir 1 finger                               MODALITIES       Vaso  -- 10'  --              Other Therapeutic Activities/Education:  Education provided to patient on sleeping arrangements, try not to sleep with pillow under knees and if do, do it vertically. Extension prop multiple times daily. Home Exercise Program:      Access Code: [email or web address]  URL: [email or web address]  Date: 11/09/2022  Prepared by: Andrews Monae    Exercises  Long Sitting Quad Set with Towel Roll Under Heel - 1 x daily - 7 x weekly - 3 sets - 10 reps - 5\" hold  Supine Knee Extension Strengthening - 1 x daily - 7 x weekly - 3 sets - 10 reps - 5\" hold  Supine Active Straight Leg Raise - 1 x daily - 7 x weekly - 3 sets - 10 reps  Prone Knee Flexion - 1 x daily - 7 x weekly - 3 sets - 10 reps  Seated Knee Flexion Stretch - 1 x daily - 7 x weekly - 3 sets - 30\" hold  Seated Hamstring Stretch - 1 x daily - 7 x weekly - 3 sets - 30\" hold  Prone Quadriceps Stretch with Strap - 1 x daily - 7 x weekly - 3 sets - 30\" hold        Manual Treatments:   PROM as above       Modalities:      Communication with other providers:  POC sent 10/7/22       Assessment: Kiara Coleman presents with improvements in ROM and LE strength since evaluation but is still lacking both extension and flexion . The patient continues to have difficulty with right quad engagement, gait dysfunction and activity tolerance. Kiara Coleman will continue to benefit from E-stim. The patient will continue to benefit from therapy services to address the above deficits to return to previous level of function. End pain: 3/10        Plan for Next Session:  NMES during SAQ and standing TKE.  Continue ROM stretches / exercises and functional strengthening        Time In / Time Out:  1302/1350      Timed Code/Total Treatment Minutes:  48'/48'     2 TE 1 NMR     Treatment Charges: Mins Units   [] Evaluation       []  Low       []  Moderate       []  High       []  Modalities     [x]  Ther Exercise     []  Manual Therapy     [x]  Ther Activities     []  Aquatics     []  Vasocompression     [x]  Other: neuro         Next Progress Note due:  20th         Plan of Care Interventions:  [x] Therapeutic Exercise  [x] Modalities:  [x] Therapeutic Activity     [] Ultrasound  [x] Estim  [x] Gait Training      [] Cervical Traction [] Lumbar Traction  [x] Neuromuscular Re-education    [x] Cold/hotpack [] Iontophoresis   [x] Instruction in HEP      [x] Vasopneumatic   [x] Dry Needling    [x] Manual Therapy               [] Aquatic Therapy              Electronically signed by:  BLAIR Arce  11/14/2022 9:01 AM   Padmini Green PTA

## 2022-11-16 ENCOUNTER — HOSPITAL ENCOUNTER (OUTPATIENT)
Dept: PHYSICAL THERAPY | Age: 52
Setting detail: THERAPIES SERIES
Discharge: HOME | End: 2022-11-16
Payer: COMMERCIAL

## 2022-11-16 ENCOUNTER — OFFICE VISIT (OUTPATIENT)
Dept: ORTHOPEDIC SURGERY | Age: 52
End: 2022-11-16

## 2022-11-16 VITALS — BODY MASS INDEX: 33.29 KG/M2 | OXYGEN SATURATION: 98 % | HEIGHT: 70 IN | HEART RATE: 91 BPM

## 2022-11-16 DIAGNOSIS — Z96.651 S/P TOTAL KNEE ARTHROPLASTY, RIGHT: Primary | ICD-10-CM

## 2022-11-16 PROCEDURE — 97110 THERAPEUTIC EXERCISES: CPT

## 2022-11-16 PROCEDURE — 97112 NEUROMUSCULAR REEDUCATION: CPT

## 2022-11-16 PROCEDURE — 99024 POSTOP FOLLOW-UP VISIT: CPT | Performed by: ORTHOPAEDIC SURGERY

## 2022-11-16 ASSESSMENT — ENCOUNTER SYMPTOMS
COLOR CHANGE: 0
EYE REDNESS: 0
CHEST TIGHTNESS: 0
BACK PAIN: 0
ABDOMINAL PAIN: 0

## 2022-11-16 NOTE — FLOWSHEET NOTE
Outpatient Physical Therapy  [facility]           [x] Phone: [phone]   Fax: [phone]  [facility]           [] Phone: [phone]   Fax: [phone]        Physical Therapy Daily Treatment Note  Date:  2022    Patient Name:  Jose Zimmer    :  1970  MRN: [id number]  Restrictions/Precautions: Restrictions/Precautions: Weight Bearing As Tolerated   Diagnosis:    R TKA  Primary osteoarthritis of right knee [M17.11]  Chronic pain of right knee [M25.561, G89.29]    Date of Injury/Surgery: 10/4/2022  Treatment Diagnosis:     Insurance/Certification information:  [facility]   Referring Physician:  Donny Olsen DO     PCP: Massimo Salas MD  Next Doctor Visit:    Plan of care signed (Y/N): Y  Outcome Measure: LEFS:  full function   Visit# / total visits:   Pain level:  4/10     Goals:     Patient goals:    Long Term Goals  Time Frame for Long Term Goals:     Long Term Goals: 6 weeks   Long Term Goal 1: Pt will demo I with HEP/symptom management. Met  Long Term Goal 2: Pt will demo normal gait mechanics with min/no deficits to ease community mobility. Not Met: decreased right TKE with right antalgia  Long Term Goal 3: Pt will demo 0-120 deg knee A/PROM to ease transfers. Not Met  Long Term Goal 4: Pt will completed TUG <12 sec to demo improved mobility. Not Met: 12.9 seconds   Long Term Goal 5: Pt will demo >50/80 improvement per LEFS to demo improved functional mobility. Not Met: 27 (67%)  Long Term Goal 6: Pt will demo 5x sit to stand <15 sec to demo improved LE strength and ease with transfers. Not Met: 15.8 seconds       Summary of Evaluation:   Pt is 46year old male s/p R TKA 10/4/22. Pt now has difficulties completing general mobility, ADLs and transfers. Pt demo deficits this date that include knee ROM, quad activation, gait mechanics, effusion and pain.  Pt will benefit with PT services with progression of ea dir 1 finger                            MODALITIES      Vaso   --              Other Therapeutic Activities/Education:  Education provided to patient on sleeping arrangements, try not to sleep with pillow under knees and if do, do it vertically. Extension prop multiple times daily. Home Exercise Program:      Access Code: [email or web address]  URL: [email or web address]  Date: 11/09/2022  Prepared by: Paulette Cea    Exercises  Long Sitting Quad Set with Towel Roll Under Heel - 1 x daily - 7 x weekly - 3 sets - 10 reps - 5\" hold  Supine Knee Extension Strengthening - 1 x daily - 7 x weekly - 3 sets - 10 reps - 5\" hold  Supine Active Straight Leg Raise - 1 x daily - 7 x weekly - 3 sets - 10 reps  Prone Knee Flexion - 1 x daily - 7 x weekly - 3 sets - 10 reps  Seated Knee Flexion Stretch - 1 x daily - 7 x weekly - 3 sets - 30\" hold  Seated Hamstring Stretch - 1 x daily - 7 x weekly - 3 sets - 30\" hold  Prone Quadriceps Stretch with Strap - 1 x daily - 7 x weekly - 3 sets - 30\" hold        Manual Treatments:   PROM as above       Modalities:      Communication with other providers:  POC sent 10/7/22       Assessment: Continued with NMES to the right quad to improve quad facilitation. Applied NMES during WB TKE to improve quad facilitation during stance and gait. Continued knee extension and knee flexion stretches as listed above to improve ROM. Educated the patient on proper gait mechanics and the improtance in normalizing gait pattern. Patient demonstrates improve gait performance following mod verbal and tactile cues for heel to toe, TKE / quad contraction during IC and stance. End pain: 3/10        Plan for Next Session:  NMES during SAQ and standing TKE.  Continue ROM stretches / exercises and functional strengthening, Gait training        Time In / Time Out:  1645/1730      Timed Code/Total Treatment Minutes:  45'/45'     1 TE 2 NMR     Treatment Charges: Mins Units   [] Evaluation       []  Low       [] Moderate       []  High       []  Modalities     [x]  Ther Exercise 20 1   []  Manual Therapy     [x]  Ther Activities     []  Aquatics     []  Vasocompression     [x]  Other: neuro 25 (15 NMES) 2       Next Progress Note due:  20th         Plan of Care Interventions:  [x] Therapeutic Exercise  [x] Modalities:  [x] Therapeutic Activity     [] Ultrasound  [x] Estim  [x] Gait Training      [] Cervical Traction [] Lumbar Traction  [x] Neuromuscular Re-education    [x] Cold/hotpack [] Iontophoresis   [x] Instruction in HEP      [x] Vasopneumatic   [x] Dry Needling    [x] Manual Therapy               [] Aquatic Therapy              Electronically signed by:   Mary Butler PT, DPT 11/16/2022 5:41 PM

## 2022-11-16 NOTE — PROGRESS NOTES
Subjective:      Patient ID: Yaakov Santos is a 46 y.o. male. Patient seen in office today 6 wk post op RT TKA DOS 10/4/22. Pain more at nighttime. Flexion is progressively. PT going well, uses cane for ambulation. 3/10 pain level. Ready to drive now. He comes in today for 6-week postop recheck. Overall he states that he is feeling much better and is not having much pain in the right knee. He does continue to have swelling globally in the right knee as well as difficulty with full extension. Patient denies any new injury to the involved extremity/ joint, denies numbness or tingling in the involved extremity and denies fever or chills. Review of Systems   Constitutional:  Negative for activity change, chills and fever. HENT:  Negative for congestion and drooling. Eyes:  Negative for redness. Respiratory:  Negative for chest tightness. Cardiovascular:  Negative for chest pain. Gastrointestinal:  Negative for abdominal pain. Endocrine: Negative for cold intolerance and heat intolerance. Musculoskeletal:  Positive for joint swelling and myalgias. Negative for arthralgias, back pain and gait problem. Skin:  Negative for color change, pallor, rash and wound. Neurological:  Negative for weakness and numbness. Psychiatric/Behavioral:  Negative for confusion.       Past Medical History:   Diagnosis Date    Cerebral artery occlusion with cerebral infarction ([address]     \"had stroke 3/30/2020- had subarachnoid hemorrhage- dont remember my symtoms - all resolved now\"    Diabetes mellitus ([address]     dx 2020    History of short term memory loss     \"this has gotten better\"    Hyperlipidemia     Personal history of other medical treatment     per pt \"my wife and daughter have malignant hyperthermia\"    Primary osteoarthritis of right knee 10/5/2022    Subarachnoid hemorrhage ([address] 03/2020    Wears glasses     to read       Objective:   Physical Exam  Constitutional:       Appearance: He is well-developed. HENT:      Head: Normocephalic. Nose: No congestion. Eyes:      Pupils: Pupils are equal, round, and reactive to light. Pulmonary:      Effort: Pulmonary effort is normal.   Musculoskeletal:         General: Swelling present. No tenderness or deformity. Cervical back: Normal range of motion. Right hip: Normal.      Left hip: Normal.      Right knee: Swelling present. No deformity, effusion, erythema, ecchymosis, lacerations, bony tenderness or crepitus. Decreased range of motion. No tenderness. No medial joint line, lateral joint line, MCL or LCL tenderness. No LCL laxity or MCL laxity. Normal alignment and normal patellar mobility. Left knee: Normal. No swelling, deformity, effusion, erythema, ecchymosis, lacerations or bony tenderness. Normal range of motion. No tenderness. No medial joint line, lateral joint line, MCL, LCL or patellar tendon tenderness. No LCL laxity or MCL laxity. Normal alignment and normal patellar mobility. Skin:     General: Skin is warm and dry. Capillary Refill: Capillary refill takes less than 2 seconds. Coloration: Skin is not pale. Findings: No erythema or rash. Neurological:      Mental Status: He is alert and oriented to person, place, and time. Sensory: No sensory deficit. Motor: No weakness. Right knee-Incision clean, dry, intact, with no erythema, no drainage, and no signs of infection. 5-130    XR KNEE RIGHT (3 VIEWS)    Result Date: 11/16/2022  XRAY X-ray 3 views of the right knee obtained and reviewed by me today in the office demonstrates age appropriate bone density throughout with well-positioned right total knee arthroplasty, there has been no change in position components compared to prior x-rays, normal tracking of the patella, no acute osseous abnormalities. Impression: Stable right total knee arthroplasty with no acute process.         Assessment:      Right TKA, 6 weeks      Plan:      I discussed with him today his x-ray findings. I explained to him that his implants are stable and remain in good alignment. I discussed with him today that he is continuing to progress extremely well. I discussed with the patient today that their are symptoms are normal and should improve with time. I explained to him that he needs to work more aggressively on extension exercises and it may take a full year to regain his motion. I discussed with the patient today antibiotic prophylaxis for procedures. Continue weight-bearing as tolerated. Continue range of motion exercises as instructed. Ice and elevate as needed. Tylenol or Motrin for pain. Follow up in 6 weeks for recheck with x-rays of the right knee and if no better we will consider Dynasplint to the right knee.               Sebastian Parisi, DO

## 2022-11-16 NOTE — PROGRESS NOTES
Patient seen in office today 6 wk post op RT TKA DOS 10/4/22. Pain more at nighttime. Flexion is progressively. PT going well, uses cane for ambulation. 3/10 pain level. Ready to drive now.

## 2022-11-21 ENCOUNTER — HOSPITAL ENCOUNTER (OUTPATIENT)
Dept: PHYSICAL THERAPY | Age: 52
Setting detail: THERAPIES SERIES
Discharge: HOME | End: 2022-11-21
Payer: COMMERCIAL

## 2022-11-21 PROCEDURE — 97112 NEUROMUSCULAR REEDUCATION: CPT

## 2022-11-21 PROCEDURE — 97140 MANUAL THERAPY 1/> REGIONS: CPT

## 2022-11-21 PROCEDURE — 97110 THERAPEUTIC EXERCISES: CPT

## 2022-11-21 PROCEDURE — 97016 VASOPNEUMATIC DEVICE THERAPY: CPT

## 2022-11-21 NOTE — FLOWSHEET NOTE
Outpatient Physical Therapy  [facility]           [x] Phone: [phone]   Fax: [phone]  [place]           [] Phone: [phone]   Fax: [phone]        Physical Therapy Daily Treatment Note  Date:  2022    Patient Name:  Laura Mcnulty    :  1970  MRN: [id number]  Restrictions/Precautions: Restrictions/Precautions: Weight Bearing As Tolerated   Diagnosis:    R TKA  Primary osteoarthritis of right knee [M17.11]  Chronic pain of right knee [M25.561, G89.29]    Date of Injury/Surgery: 10/4/2022  Treatment Diagnosis:     Insurance/Certification information:  [facility]   Referring Physician:  Ambreen Mireles DO     PCP: Cleo Wolfe MD  Next Doctor Visit:    Plan of care signed (Y/N): Y  Outcome Measure: LEFS:  full function   Visit# / total visits: 15/20  Pain level:  1/10     Goals:     Patient goals:    Long Term Goals  Time Frame for Long Term Goals:     Long Term Goals: 6 weeks   Long Term Goal 1: Pt will demo I with HEP/symptom management. Met  Long Term Goal 2: Pt will demo normal gait mechanics with min/no deficits to ease community mobility. Not Met: decreased right TKE with right antalgia  Long Term Goal 3: Pt will demo 0-120 deg knee A/PROM to ease transfers. Not Met  Long Term Goal 4: Pt will completed TUG <12 sec to demo improved mobility. Not Met: 12.9 seconds   Long Term Goal 5: Pt will demo >50/80 improvement per LEFS to demo improved functional mobility. Not Met: 27 (67%)  Long Term Goal 6: Pt will demo 5x sit to stand <15 sec to demo improved LE strength and ease with transfers. Not Met: 15.8 seconds       Summary of Evaluation:   Pt is 46year old male s/p R TKA 10/4/22. Pt now has difficulties completing general mobility, ADLs and transfers. Pt demo deficits this date that include knee ROM, quad activation, gait mechanics, effusion and pain.  Pt will benefit with PT services with progression of strength/ROM, manual, modalities to return to PLOF. Pt prior to onset of current condition had min/no pain with able to complete full ADLs and work activities. Patient received education on their current pathology and how their condition effects them with their functional activities. Patient understood discussion and questions were answered. Patient understands their activity limitations and understands rational for treatment progression. Subjective:  Pt arrives to tx session reporting 1/10 pain in R knee. Any changes in Ambulatory Summary Sheet?   None      Objective:   COVID screening questions were asked and patient attested that there had been no contact or symptoms    Lacking 5 deg ext with OP    Exercises: (No more than 4 columns)   Exercise/Equipment 11/9/22 11/14/22 #13 11/16/22 #14 11/21/22 #15            WARM UP          Nu step  L5 5' L5 5' L5 5'    Recumbent bike    5'    Star trac       TABLE       Manual therapy       *Quad set  5' NMES 5' NMES 5' NMES 5' NMES   *Ankle pumps       *Heel prop   3'4#     *Sitting AAROM knee flexion stretch        Bridge        TKE   5' NMES     LAQ       Heel slides w/ strap       SLR       HS stretch       Prone quad stretch 5 x 5\" w/ manual overpressure  5 x 5 \" w/ manual overpressure 5 x 5 \" w/ manual overpressure 5 x 5 \" w/ manual overpressure   Shuttle press        SAQ   5' NMES 5' NMES 5' NMES 5' NMES   Knee extension stretch 5 x 5 \" w/ manual overpressure  5 x 5 \" w/ manual overpressure 5 x 5 \" w/ manual overpressure 5 x 5 \" w/ manual overpressure   STANDING       LE marches with FWW       Step taps        Stair training with and without cane and use of rail       Amb w/ SPC       Slant board gastroc stretch  --     HR  --     Marching  3x10     Step up  2x10 6\"     TRX squats       Retro walking FM  4 laps 7#            PROPRIOCEPTION       Wobbleboard   30\" ea dir 1 finger                                 MODALITIES       Vaso   --                Other Therapeutic Activities/Education:  Education provided to patient on sleeping arrangements, try not to sleep with pillow under knees and if do, do it vertically. Extension prop multiple times daily. Home Exercise Program:      Access Code: [email or web address]  URL: [email or web address]  Date: 11/09/2022  Prepared by: Dejah Tadeo    Exercises  Long Sitting Quad Set with Towel Roll Under Heel - 1 x daily - 7 x weekly - 3 sets - 10 reps - 5\" hold  Supine Knee Extension Strengthening - 1 x daily - 7 x weekly - 3 sets - 10 reps - 5\" hold  Supine Active Straight Leg Raise - 1 x daily - 7 x weekly - 3 sets - 10 reps  Prone Knee Flexion - 1 x daily - 7 x weekly - 3 sets - 10 reps  Seated Knee Flexion Stretch - 1 x daily - 7 x weekly - 3 sets - 30\" hold  Seated Hamstring Stretch - 1 x daily - 7 x weekly - 3 sets - 30\" hold  Prone Quadriceps Stretch with Strap - 1 x daily - 7 x weekly - 3 sets - 30\" hold        Manual Treatments:   PROM as above, contract relax      Modalities:  Vaso 10'       Communication with other providers:  POC sent 10/7/22       Assessment: Continued with NMES to the right quad to improve quad facilitation. Tolerated rec bike well; continues to lack full ext. Pt will continue to benefit with PT services with progression of strength/ROM, manual, modalities to return to PLOF. End pain: 2/10        Plan for Next Session:  NMES during SAQ and standing TKE.  Continue ROM stretches / exercises and functional strengthening, Gait training        Time In / Time Out:   1725 / 1815      Timed Code/Total Treatment Minutes:     36' / 50'    1 TE    1 NR    1 Man    1 Vaso      Treatment Charges: Mins Units   [] Evaluation       []  Low       []  Moderate       []  High       []  Modalities     [x]  Ther Exercise 15 1   [x]  Manual Therapy 15 1   []  Ther Activities     []  Aquatics     [x]  Vasocompression 10 1   [x]  Other: neuro 10 NMES        1       Next Progress Note due:  20th         Plan of Care Interventions:  [x] Therapeutic Exercise  [x] Modalities:  [x] Therapeutic Activity     [] Ultrasound  [x] Estim  [x] Gait Training      [] Cervical Traction [] Lumbar Traction  [x] Neuromuscular Re-education    [x] Cold/hotpack [] Iontophoresis   [x] Instruction in HEP      [x] Vasopneumatic   [x] Dry Needling    [x] Manual Therapy               [] Aquatic Therapy              Electronically signed by:  Kelin Tate PTA    11/21/2022 5:42 PM

## 2022-11-28 ENCOUNTER — HOSPITAL ENCOUNTER (OUTPATIENT)
Dept: PHYSICAL THERAPY | Age: 52
Discharge: HOME | End: 2022-11-28

## 2022-11-28 NOTE — FLOWSHEET NOTE
Outpatient Physical Therapy  [place]           [x] Phone: [phone]   Fax: [phone]  [place]           [] Phone: [phone]   Fax: [phone]        Physical Therapy Daily Treatment Note  Date:  2022    Patient Name:  Laura Mcnulty    :  1970  MRN: [id number]  Restrictions/Precautions: Restrictions/Precautions: Weight Bearing As Tolerated   Diagnosis:    R TKA  Primary osteoarthritis of right knee [M17.11]  Chronic pain of right knee [M25.561, G89.29]    Date of Injury/Surgery: 10/4/2022  Treatment Diagnosis:     Insurance/Certification information:  [facility]   Referring Physician:  Ambreen Mireles DO     PCP: Cleo Wolfe MD  Next Doctor Visit:    Plan of care signed (Y/N): Y  Outcome Measure: LEFS:  full function   Visit# / total visits:   Pain level:  1/10     Goals:     Patient goals:    Long Term Goals  Time Frame for Long Term Goals:     Long Term Goals: 6 weeks   Long Term Goal 1: Pt will demo I with HEP/symptom management. Met  Long Term Goal 2: Pt will demo normal gait mechanics with min/no deficits to ease community mobility. Not Met: decreased right TKE with right antalgia  Long Term Goal 3: Pt will demo 0-120 deg knee A/PROM to ease transfers. Not Met  Long Term Goal 4: Pt will completed TUG <12 sec to demo improved mobility. Not Met: 12.9 seconds   Long Term Goal 5: Pt will demo >50/80 improvement per LEFS to demo improved functional mobility. Not Met: 27 (67%)  Long Term Goal 6: Pt will demo 5x sit to stand <15 sec to demo improved LE strength and ease with transfers. Not Met: 15.8 seconds       Summary of Evaluation:   Pt is 46year old male s/p R TKA 10/4/22. Pt now has difficulties completing general mobility, ADLs and transfers. Pt demo deficits this date that include knee ROM, quad activation, gait mechanics, effusion and pain.  Pt will benefit with PT services with progression of strength/ROM, manual, modalities to return to PLOF. Pt prior to onset of current condition had min/no pain with able to complete full ADLs and work activities. Patient received education on their current pathology and how their condition effects them with their functional activities. Patient understood discussion and questions were answered. Patient understands their activity limitations and understands rational for treatment progression. Subjective:  Pt arrives to tx session reporting 1/10 pain in R knee. Any changes in Ambulatory Summary Sheet?   None      Objective:   COVID screening questions were asked and patient attested that there had been no contact or symptoms    Lacking 5 deg ext with OP    Exercises: (No more than 4 columns)   Exercise/Equipment 11/14/22 #13 11/16/22 #14 11/21/22 #15 11/28/22 #16            WARM UP          Nu step  L5 5' L5 5'     Recumbent bike   5'     Star trac       TABLE       Manual therapy       *Quad set  5' NMES 5' NMES 5' NMES    *Ankle pumps       *Heel prop  3'4#      *Sitting AAROM knee flexion stretch        Bridge        TKE  5' NMES      LAQ       Heel slides w/ strap       SLR       HS stretch       Prone quad stretch 5 x 5 \" w/ manual overpressure 5 x 5 \" w/ manual overpressure 5 x 5 \" w/ manual overpressure    Shuttle press        SAQ  5' NMES 5' NMES 5' NMES    Knee extension stretch 5 x 5 \" w/ manual overpressure 5 x 5 \" w/ manual overpressure 5 x 5 \" w/ manual overpressure    STANDING       LE marches with FWW       Step taps        Stair training with and without cane and use of rail       Amb w/ SPC       Slant board gastroc stretch --      HR --      Marching 3x10      Step up 2x10 6\"      TRX squats       Retro walking FM 4 laps 7#             PROPRIOCEPTION       Wobbleboard  30\" ea dir 1 finger                                  MODALITIES       Vaso  --                 Other Therapeutic Activities/Education:  Education provided to patient on sleeping arrangements, try not to sleep with pillow under knees and if do, do it vertically. Extension prop multiple times daily. Home Exercise Program:      Access Code: [email or web address]  URL: [email or web address]/  Date: 11/09/2022  Prepared by: Aziza Browning    Exercises  Long Sitting Quad Set with Towel Roll Under Heel - 1 x daily - 7 x weekly - 3 sets - 10 reps - 5\" hold  Supine Knee Extension Strengthening - 1 x daily - 7 x weekly - 3 sets - 10 reps - 5\" hold  Supine Active Straight Leg Raise - 1 x daily - 7 x weekly - 3 sets - 10 reps  Prone Knee Flexion - 1 x daily - 7 x weekly - 3 sets - 10 reps  Seated Knee Flexion Stretch - 1 x daily - 7 x weekly - 3 sets - 30\" hold  Seated Hamstring Stretch - 1 x daily - 7 x weekly - 3 sets - 30\" hold  Prone Quadriceps Stretch with Strap - 1 x daily - 7 x weekly - 3 sets - 30\" hold        Manual Treatments:   PROM as above, contract relax      Modalities:  Vaso 10'       Communication with other providers:  POC sent 10/7/22       Assessment: Continued with NMES to the right quad to improve quad facilitation. Tolerated rec bike well; continues to lack full ext. Pt will continue to benefit with PT services with progression of strength/ROM, manual, modalities to return to PLOF. End pain: 2/10        Plan for Next Session:  NMES during SAQ and standing TKE.  Continue ROM stretches / exercises and functional strengthening, Gait training        Time In / Time Out:        Timed Code/Total Treatment Minutes:        1 TE    1 NR    1 Man    1 Vaso      Treatment Charges: Mins Units   [] Evaluation       []  Low       []  Moderate       []  High       []  Modalities     [x]  Ther Exercise 15 1   [x]  Manual Therapy 15 1   []  Ther Activities     []  Aquatics     [x]  Vasocompression 10 1   [x]  Other: neuro 10 NMES        1       Next Progress Note due:  20th         Plan of Care Interventions:  [x] Therapeutic Exercise  [x] Modalities:  [x] Therapeutic Activity     [] Ultrasound  [x] Estim  [x] Gait Training      [] Cervical Traction [] Lumbar Traction  [x] Neuromuscular Re-education    [x] Cold/hotpack [] Iontophoresis   [x] Instruction in HEP      [x] Vasopneumatic   [x] Dry Needling    [x] Manual Therapy               [] Aquatic Therapy              Electronically signed by:  BLAIR Montelongo    11/28/2022 9:08 AM     Kristen Crigler, PTA

## 2022-12-02 ENCOUNTER — HOSPITAL ENCOUNTER (OUTPATIENT)
Dept: PHYSICAL THERAPY | Age: 52
Setting detail: THERAPIES SERIES
Discharge: HOME | End: 2022-12-02
Payer: COMMERCIAL

## 2022-12-02 PROCEDURE — 97140 MANUAL THERAPY 1/> REGIONS: CPT

## 2022-12-02 PROCEDURE — 97110 THERAPEUTIC EXERCISES: CPT

## 2022-12-02 PROCEDURE — 97112 NEUROMUSCULAR REEDUCATION: CPT

## 2022-12-02 NOTE — FLOWSHEET NOTE
Outpatient Physical Therapy  [place]           [x] Phone: [phone]   Fax: [phone]  [place]           [] Phone: [phone]   Fax: [phone]        Physical Therapy Daily Treatment Note  Date:  2022    Patient Name:  Lidia Morrissey    :  1970  MRN: [id number]  Restrictions/Precautions: Restrictions/Precautions: Weight Bearing As Tolerated   Diagnosis:    R TKA  Primary osteoarthritis of right knee [M17.11]  Chronic pain of right knee [M25.561, G89.29]    Date of Injury/Surgery: 10/4/2022  Treatment Diagnosis:     Insurance/Certification information:  [facility]   Referring Physician:  Akbar Gillette DO     PCP: Muna Dejesus MD  Next Doctor Visit:    Plan of care signed (Y/N): Y  Outcome Measure: LEFS:  full function   Visit# / total visits:   Pain level:  4/10     Goals:     Patient goals:    Long Term Goals  Time Frame for Long Term Goals:     Long Term Goals: 6 weeks   Long Term Goal 1: Pt will demo I with HEP/symptom management. Met  Long Term Goal 2: Pt will demo normal gait mechanics with min/no deficits to ease community mobility. Not Met: decreased right TKE with right antalgia  Long Term Goal 3: Pt will demo 0-120 deg knee A/PROM to ease transfers. Not Met  Long Term Goal 4: Pt will completed TUG <12 sec to demo improved mobility. Not Met: 12.9 seconds   Long Term Goal 5: Pt will demo >50/80 improvement per LEFS to demo improved functional mobility. Not Met: 27 (67%)  Long Term Goal 6: Pt will demo 5x sit to stand <15 sec to demo improved LE strength and ease with transfers. Not Met: 15.8 seconds       Summary of Evaluation:   Pt is 46year old male s/p R TKA 10/4/22. Pt now has difficulties completing general mobility, ADLs and transfers. Pt demo deficits this date that include knee ROM, quad activation, gait mechanics, effusion and pain.  Pt will benefit with PT services with progression of strength/ROM, manual, modalities to return to PLOF. Pt prior to onset of current condition had min/no pain with able to complete full ADLs and work activities. Patient received education on their current pathology and how their condition effects them with their functional activities. Patient understood discussion and questions were answered. Patient understands their activity limitations and understands rational for treatment progression. Subjective:  Pt arrives to tx session reporting 4/10 pain in R knee. Any changes in Ambulatory Summary Sheet?   None      Objective:   COVID screening questions were asked and patient attested that there had been no contact or symptoms    Lacking 6 deg ext with OP    Exercises: (No more than 4 columns)   Exercise/Equipment 11/14/22 #13 11/16/22 #14 11/21/22 #15 12/2/22 #16            WARM UP          Nu step  L5 5' L5 5'     Recumbent bike   5' 5'    Star trac       TABLE       Manual therapy       *Quad set  5' NMES 5' NMES 5' NMES 5' NMES   *Ankle pumps       *Heel prop  3'4#      *Sitting AAROM knee flexion stretch        Bridge        TKE  5' NMES      LAQ       Heel slides w/ strap       SLR       HS stretch       Prone quad stretch 5 x 5 \" w/ manual overpressure 5 x 5 \" w/ manual overpressure 5 x 5 \" w/ manual overpressure 5 x 5 \" w/ manual overpressure   Shuttle press        SAQ  5' NMES 5' NMES 5' NMES 5' NMES   Knee extension stretch 5 x 5 \" w/ manual overpressure 5 x 5 \" w/ manual overpressure 5 x 5 \" w/ manual overpressure 5 x 5 \" w/ manual overpressure   STANDING       LE marches with FWW       Step taps        Stair training with and without cane and use of rail       Amb w/ SPC       Slant board gastroc stretch --      HR --      Marching 3x10      Step up 2x10 6\"      TRX squats       Retro walking FM 4 laps 7#             PROPRIOCEPTION       Wobbleboard  30\" ea dir 1 finger                                  MODALITIES       Vaso  --                 Other Therapeutic Activities/Education:  Education provided to patient on sleeping arrangements, try not to sleep with pillow under knees and if do, do it vertically. Extension prop multiple times daily. Home Exercise Program:      Access Code: [email or web address]  URL: [email or web address]  Date: 11/09/2022  Prepared by: Sonny Guardado    Exercises  Long Sitting Quad Set with Towel Roll Under Heel - 1 x daily - 7 x weekly - 3 sets - 10 reps - 5\" hold  Supine Knee Extension Strengthening - 1 x daily - 7 x weekly - 3 sets - 10 reps - 5\" hold  Supine Active Straight Leg Raise - 1 x daily - 7 x weekly - 3 sets - 10 reps  Prone Knee Flexion - 1 x daily - 7 x weekly - 3 sets - 10 reps  Seated Knee Flexion Stretch - 1 x daily - 7 x weekly - 3 sets - 30\" hold  Seated Hamstring Stretch - 1 x daily - 7 x weekly - 3 sets - 30\" hold  Prone Quadriceps Stretch with Strap - 1 x daily - 7 x weekly - 3 sets - 30\" hold        Manual Treatments:   PROM as above, contract relax      Modalities:      Communication with other providers:  POC sent 10/7/22       Assessment: Continued with NMES to the right quad to improve quad facilitation. Continues to lack full ext; lacking 6deg. Pt will continue to benefit with PT services with progression of strength/ROM, manual, modalities to return to PLOF. End pain: 0/10        Plan for Next Session:  NMES during SAQ and standing TKE.  Continue ROM stretches / exercises and functional strengthening, Gait training        Time In / Time Out:    1615 / 1655      Timed Code/Total Treatment Minutes:    36' / 40'    1 TE    1 NR    1 Man       Treatment Charges: Mins Units   [] Evaluation       []  Low       []  Moderate       []  High       []  Modalities     [x]  Ther Exercise 15 1   [x]  Manual Therapy 15 1   []  Ther Activities     []  Aquatics     []  Vasocompression     [x]  Other: neuro 10 NMES        1       Next Progress Note due:  20th         Plan of Care Interventions:  [x] Therapeutic Exercise  [x] Modalities:  [x] Therapeutic Activity     [] Ultrasound  [x] Estim  [x] Gait Training      [] Cervical Traction [] Lumbar Traction  [x] Neuromuscular Re-education    [x] Cold/hotpack [] Iontophoresis   [x] Instruction in HEP      [x] Vasopneumatic   [x] Dry Needling    [x] Manual Therapy               [] Aquatic Therapy              Electronically signed by:  Zakiya Cortez PTA    12/2/2022 4:26 PM

## 2022-12-05 ENCOUNTER — HOSPITAL ENCOUNTER (OUTPATIENT)
Dept: PHYSICAL THERAPY | Age: 52
Setting detail: THERAPIES SERIES
Discharge: HOME | End: 2022-12-05
Payer: COMMERCIAL

## 2022-12-05 PROCEDURE — 97112 NEUROMUSCULAR REEDUCATION: CPT

## 2022-12-05 PROCEDURE — 97110 THERAPEUTIC EXERCISES: CPT

## 2022-12-05 NOTE — FLOWSHEET NOTE
Outpatient Physical Therapy  [place]           [x] Phone: [phone]   Fax: [phone]  [place]           [] Phone: [phone]   Fax: [phone]        Physical Therapy Daily Treatment Note  Date:  2022    Patient Name:  Thiago Amador    :  1970  MRN: [id number]  Restrictions/Precautions: Restrictions/Precautions: Weight Bearing As Tolerated   Diagnosis:    R TKA  Primary osteoarthritis of right knee [M17.11]  Chronic pain of right knee [M25.561, G89.29]    Date of Injury/Surgery: 10/4/2022  Treatment Diagnosis:     Insurance/Certification information:  [facility]   Referring Physician:  Jennifer Perkins DO     PCP: Dorothy Archuleta MD  Next Doctor Visit:    Plan of care signed (Y/N): Y  Outcome Measure: LEFS:  full function   Visit# / total visits:   Pain level:  4/10     Goals:     Patient goals:    Long Term Goals  Time Frame for Long Term Goals:     Long Term Goals: 6 weeks   Long Term Goal 1: Pt will demo I with HEP/symptom management. Met  Long Term Goal 2: Pt will demo normal gait mechanics with min/no deficits to ease community mobility. Not Met: decreased right TKE with right antalgia  Long Term Goal 3: Pt will demo 0-120 deg knee A/PROM to ease transfers. Not Met; 12/5 Extension lacking 5°  Long Term Goal 4: Pt will completed TUG <12 sec to demo improved mobility. Not Met: 12.9 seconds   Long Term Goal 5: Pt will demo >50/80 improvement per LEFS to demo improved functional mobility. Not Met: 27 (67%)  Long Term Goal 6: Pt will demo 5x sit to stand <15 sec to demo improved LE strength and ease with transfers. Not Met: 15.8 seconds       Summary of Evaluation:   Pt is 46year old male s/p R TKA 10/4/22. Pt now has difficulties completing general mobility, ADLs and transfers. Pt demo deficits this date that include knee ROM, quad activation, gait mechanics, effusion and pain.  Pt will benefit with PT services with progression of strength/ROM, manual, modalities to return to PLOF. Pt prior to onset of current condition had min/no pain with able to complete full ADLs and work activities. Patient received education on their current pathology and how their condition effects them with their functional activities. Patient understood discussion and questions were answered. Patient understands their activity limitations and understands rational for treatment progression. Subjective:  Ludy Capellan arrives to tx session reporting 4/10 pain in R knee. Any changes in Ambulatory Summary Sheet?   None      Objective:   COVID screening questions were asked and patient attested that there had been no contact or symptoms    Lacking 5 deg ext with OP  Extensor lag on all extension activities  Limp in gait w/o AD     Exercises: (No more than 4 columns)   Exercise/Equipment 11/14/22 #13 11/16/22 #14 11/21/22 #15 12/2/22 #16 12/5/22 #17             WARM UP           Nu step  L5 5' L5 5'      Recumbent bike   5' 5' 5', L6    Star trac        TABLE        Manual therapy        *Quad set  5' NMES 5' NMES 5' NMES 5' NMES 5' NMES   *Ankle pumps        *Heel prop  3'4#       *Sitting AAROM knee flexion stretch         Bridge         TKE  5' NMES    1x10 standing    LAQ        Heel slides w/ strap        SLR        HS stretch        Prone quad stretch 5 x 5 \" w/ manual overpressure 5 x 5 \" w/ manual overpressure 5 x 5 \" w/ manual overpressure 5 x 5 \" w/ manual overpressure 5 x 5 \" w/ manual overpressure   Shuttle press         SAQ  5' NMES 5' NMES 5' NMES 5' NMES 5' NMES   Knee extension stretch 5 x 5 \" w/ manual overpressure 5 x 5 \" w/ manual overpressure 5 x 5 \" w/ manual overpressure 5 x 5 \" w/ manual overpressure 5 x 5 \" w/ manual overpressure   STANDING        LE marches with FWW        Step taps         Stair training with and without cane and use of rail        Amb w/ SPC        Slant board gastroc stretch --       HR -- Marching 3x10       Step up 2x10 6\"    2x10 6\"   TRX squats        Retro walking FM 4 laps 7#    4 laps 7#           PROPRIOCEPTION        Wobbleboard  30\" ea dir 1 finger                                       MODALITIES        Vaso  --                   Other Therapeutic Activities/Education:  Education provided to patient on sleeping arrangements, try not to sleep with pillow under knees and if do, do it vertically. Extension prop multiple times daily. Home Exercise Program:      Access Code: [email or web address]  URL: [email or web address]  Date: 11/09/2022  Prepared by: Flor Malave    Exercises  Long Sitting Quad Set with Towel Roll Under Heel - 1 x daily - 7 x weekly - 3 sets - 10 reps - 5\" hold  Supine Knee Extension Strengthening - 1 x daily - 7 x weekly - 3 sets - 10 reps - 5\" hold  Supine Active Straight Leg Raise - 1 x daily - 7 x weekly - 3 sets - 10 reps  Prone Knee Flexion - 1 x daily - 7 x weekly - 3 sets - 10 reps  Seated Knee Flexion Stretch - 1 x daily - 7 x weekly - 3 sets - 30\" hold  Seated Hamstring Stretch - 1 x daily - 7 x weekly - 3 sets - 30\" hold  Prone Quadriceps Stretch with Strap - 1 x daily - 7 x weekly - 3 sets - 30\" hold        Manual Treatments:   PROM as above, contract relax      Modalities:      Communication with other providers:  POC sent 10/7/22       Assessment: Pt demo'd good tolerance to today's session but continues to lack full ext; lacking 5 deg. Continued with NMES to the right quad to improve quad facilitation to gain full knee extension and help w/ functional activities. End pain: 3/10        Plan for Next Session:  NMES during SAQ and standing TKE.  Continue ROM stretches / exercises and functional strengthening, Gait training        Time In / Time Out:    1726/1810      Timed Code/Total Treatment Minutes:    44'/44'  2 TE 1 NR         Next Progress Note due:  20th         Plan of Care Interventions:  [x] Therapeutic Exercise  [x] Modalities:  [x] Therapeutic Activity     [] Ultrasound  [x] Estim  [x] Gait Training      [] Cervical Traction [] Lumbar Traction  [x] Neuromuscular Re-education    [x] Cold/hotpack [] Iontophoresis   [x] Instruction in HEP      [x] Vasopneumatic   [x] Dry Needling    [x] Manual Therapy               [] Aquatic Therapy              Electronically signed by:  BLAIR Benitez    12/5/2022 9:13 AM       Chela Schofield PTA

## 2022-12-07 ENCOUNTER — HOSPITAL ENCOUNTER (OUTPATIENT)
Dept: PHYSICAL THERAPY | Age: 52
Setting detail: THERAPIES SERIES
Discharge: HOME | End: 2022-12-07
Payer: COMMERCIAL

## 2022-12-07 PROCEDURE — 97140 MANUAL THERAPY 1/> REGIONS: CPT

## 2022-12-07 PROCEDURE — 97110 THERAPEUTIC EXERCISES: CPT

## 2022-12-07 NOTE — FLOWSHEET NOTE
Outpatient Physical Therapy  [place]           [x] Phone: [phone]   Fax: [phone]  [place]           [] Phone: [phone]   Fax: [phone]        Physical Therapy Daily Treatment Note  Date:  2022    Patient Name:  Jody Polanco    :  1970  MRN: [id number]  Restrictions/Precautions: Restrictions/Precautions: Weight Bearing As Tolerated   Diagnosis:    R TKA  Primary osteoarthritis of right knee [M17.11]  Chronic pain of right knee [M25.561, G89.29]    Date of Injury/Surgery: 10/4/2022  Treatment Diagnosis:     Insurance/Certification information:  [facility]   Referring Physician:  Martina Bergeron DO     PCP: Mark Mojica MD  Next Doctor Visit:    Plan of care signed (Y/N): Y  Outcome Measure: LEFS:  full function   Visit# / total visits:   Pain level:   3/10     Goals:     Patient goals:    Long Term Goals  Time Frame for Long Term Goals:     Long Term Goals: 6 weeks   Long Term Goal 1: Pt will demo I with HEP/symptom management. Met  Long Term Goal 2: Pt will demo normal gait mechanics with min/no deficits to ease community mobility. Not Met: decreased right TKE with right antalgia  Long Term Goal 3: Pt will demo 0-120 deg knee A/PROM to ease transfers. Not Met; 12/5 Extension lacking 5°  Long Term Goal 4: Pt will completed TUG <12 sec to demo improved mobility. Not Met: 12.9 seconds   Long Term Goal 5: Pt will demo >50/80 improvement per LEFS to demo improved functional mobility. Not Met: 27 (67%)  Long Term Goal 6: Pt will demo 5x sit to stand <15 sec to demo improved LE strength and ease with transfers. Not Met: 15.8 seconds       Summary of Evaluation:   Pt is 46year old male s/p R TKA 10/4/22. Pt now has difficulties completing general mobility, ADLs and transfers. Pt demo deficits this date that include knee ROM, quad activation, gait mechanics, effusion and pain.  Pt will benefit with PT services with progression of strength/ROM, manual, modalities to return to PLOF. Pt prior to onset of current condition had min/no pain with able to complete full ADLs and work activities. Patient received education on their current pathology and how their condition effects them with their functional activities. Patient understood discussion and questions were answered. Patient understands their activity limitations and understands rational for treatment progression. Subjective:  Hattie Alcantara arrives to tx session reporting 3/10 pain in R knee. No issues after last visit. Working on home program. Return back to Dr. Zaira Hernandez on the 19th. Any changes in Ambulatory Summary Sheet? None      Objective:   COVID screening questions were asked and patient attested that there had been no contact or symptoms    Good quad set without increase in pain with knee supported.     Lacking 5 deg ext with OP to 115 deg knee flex AROM  Extensor lag on all extension activities  Limp in gait w/o AD     Exercises: (No more than 4 columns)   Exercise/Equipment 12/2/22 #16 12/5/22 #17 12/7/22  #18           WARM UP         Nu step       Recumbent bike 5' 5', L6 3'     Star trac      TABLE      Manual therapy      *Quad set  5' NMES 5' NMES    *Ankle pumps      *Heel prop    20#   1' x3   *Sitting AAROM knee flexion stretch       Bridge       TKE  1x10 standing     LAQ      Heel slides w/ strap      SLR      HS stretch      Prone quad stretch 5 x 5 \" w/ manual overpressure 5 x 5 \" w/ manual overpressure    Shuttle press       SAQ  5' NMES 5' NMES    Knee extension stretch 5 x 5 \" w/ manual overpressure 5 x 5 \" w/ manual overpressure     Stool drags    10x2                                             STANDING      LE marches with FWW      Step taps       Stair training with and without cane and use of rail      Amb w/ SPC      Slant board gastroc stretch      HR      Marching      Step up  2x10 6\"    TRX squats      Retro walking FM  4 laps 7#    Shuttle leg press    2c  10x2  R LE   FM TKE   20#  10x2  3\"                                       PROPRIOCEPTION      Wobbleboard    20\"x3 lateral    Step taps on airex    20x2 12 in cone                     MODALITIES      Vaso                 Other Therapeutic Activities/Education:  Education provided to patient on sleeping arrangements, try not to sleep with pillow under knees and if do, do it vertically. Extension prop multiple times daily. Home Exercise Program:      Access Code: [email or web address]  URL: [email or web address]  Date: 11/09/2022  Prepared by: Alhaji Prescott    Exercises  Long Sitting Quad Set with Towel Roll Under Heel - 1 x daily - 7 x weekly - 3 sets - 10 reps - 5\" hold  Supine Knee Extension Strengthening - 1 x daily - 7 x weekly - 3 sets - 10 reps - 5\" hold  Supine Active Straight Leg Raise - 1 x daily - 7 x weekly - 3 sets - 10 reps  Prone Knee Flexion - 1 x daily - 7 x weekly - 3 sets - 10 reps  Seated Knee Flexion Stretch - 1 x daily - 7 x weekly - 3 sets - 30\" hold  Seated Hamstring Stretch - 1 x daily - 7 x weekly - 3 sets - 30\" hold  Prone Quadriceps Stretch with Strap - 1 x daily - 7 x weekly - 3 sets - 30\" hold        Manual Treatments:   PROM knee ext, Grade III manual knee ext mobs, patella superior mobs x18'        Modalities:      Communication with other providers:  POC sent 10/7/22       Assessment: Pt demo'd good tolerance to today's session but continues to lack full ext; lacking 5 deg. Lacking knee extension primarily due to stiffness. Completed closed chain activities with weakness noted, will progress as tolerated. Will continue focus on knee ext contributing to gait dysfunction at this time.       End pain: 4/10        Plan for Next Session:  Continue ROM stretches / exercises and functional strengthening, Gait training        Time In / Time Out:    9194-0360      Timed Code/Total Treatment Minutes:    43/43'      2 TE  22'   1 man 25'          Next Progress Note due:  20th         Plan of Care Interventions:  [x] Therapeutic Exercise  [x] Modalities:  [x] Therapeutic Activity     [] Ultrasound  [x] Estim  [x] Gait Training      [] Cervical Traction [] Lumbar Traction  [x] Neuromuscular Re-education    [x] Cold/hotpack [] Iontophoresis   [x] Instruction in HEP      [x] Vasopneumatic   [x] Dry Needling    [x] Manual Therapy               [] Aquatic Therapy              Electronically signed by:  Tasia Claude, PT, DPT, OCS    12/7/2022 8:49 AM

## 2022-12-14 ENCOUNTER — HOSPITAL ENCOUNTER (OUTPATIENT)
Dept: PHYSICAL THERAPY | Age: 52
Setting detail: THERAPIES SERIES
Discharge: HOME | End: 2022-12-14
Payer: COMMERCIAL

## 2022-12-14 PROCEDURE — 97110 THERAPEUTIC EXERCISES: CPT

## 2022-12-14 PROCEDURE — 97530 THERAPEUTIC ACTIVITIES: CPT

## 2022-12-14 PROCEDURE — 97140 MANUAL THERAPY 1/> REGIONS: CPT

## 2022-12-14 NOTE — FLOWSHEET NOTE
Outpatient Physical Therapy  [place]           [x] Phone: [phone]   Fax: [phone]  [place]           [] Phone: [phone]   Fax: [phone]        Physical Therapy Daily Treatment Note  Date:  2022    Patient Name:  David Wilkinson    :  1970  MRN: [id number]  Restrictions/Precautions: Restrictions/Precautions: Weight Bearing As Tolerated   Diagnosis:    R TKA  Primary osteoarthritis of right knee [M17.11]  Chronic pain of right knee [M25.561, G89.29]    Date of Injury/Surgery: 10/4/2022  Treatment Diagnosis:     Insurance/Certification information:  [facility]   Referring Physician:  Erlinda Dee DO     PCP: Dk Bloom MD  Next Doctor Visit:    Plan of care signed (Y/N): Y  Outcome Measure: LEFS:  full function   Visit# / total visits:   Pain level:    0/10     Goals:     Patient goals:    Long Term Goals  Time Frame for Long Term Goals:     Long Term Goals: 6 weeks   Long Term Goal 1: Pt will demo I with HEP/symptom management. Met  Long Term Goal 2: Pt will demo normal gait mechanics with min/no deficits to ease community mobility. Not Met: decreased right TKE  Long Term Goal 3: Pt will demo 0-120 deg knee A/PROM to ease transfers. Not Met; 12/5 Extension lacking 5°  Long Term Goal 4: Pt will completed TUG <12 sec to demo improved mobility. Met: 11.95seconds   Long Term Goal 5: Pt will demo >50/80 improvement per LEFS to demo improved functional mobility. Not Met: 27 (67%)  Long Term Goal 6: Pt will demo 5x sit to stand <15 sec to demo improved LE strength and ease with transfers. Met: 14.82 seconds       Summary of Evaluation:   Pt is 46year old male s/p R TKA 10/4/22. Pt now has difficulties completing general mobility, ADLs and transfers. Pt demo deficits this date that include knee ROM, quad activation, gait mechanics, effusion and pain.  Pt will benefit with PT services with progression of strength/ROM, manual, modalities to return to PLOF. Pt prior to onset of current condition had min/no pain with able to complete full ADLs and work activities. Patient received education on their current pathology and how their condition effects them with their functional activities. Patient understood discussion and questions were answered. Patient understands their activity limitations and understands rational for treatment progression. Subjective:  Usha Shine arrives to tx session reporting 0/10 pain in R knee. Soreness after last visit. Cane use at the house at 50% with mostly in the morning. Working on home program. Soreness after prolonged ambulation at the grocery store at 6/10 pain. 60% full function per patient. Return back to Dr. Mervat Chatterjee on the . Any changes in Ambulatory Summary Sheet? None      Objective:   COVID screening questions were asked and patient attested that there had been no contact or symptoms  No pain with palpation. Min antalgic gait due to lack of TKE with use of SPC. Good quad set without increase in pain with knee supported.     Lacking 4 deg ext with OP to 122 deg knee flex AROM  Extensor lag on all extension activities  4+/5 R knee flex/ext   R SLS: 4 sec without increase   TU.52 sec with cane      11.95 sec without cane  5x sit to stand: 14.82 sec   LEFS: 47/80 full function     Exercises: (No more than 4 columns)   Exercise/Equipment 22 #16 22 #17 22  #18 22  #19            WARM UP          Nu step        Recumbent bike 5' 5', L6 3'  5'    Star trac       TABLE       Manual therapy       *Quad set  5' NMES 5' NMES     *Ankle pumps       *Heel prop    20#   1' x3 15#  1'x3   *Sitting AAROM knee flexion stretch        Bridge        TKE  1x10 standing      LAQ       Heel slides w/ strap       SLR       HS stretch       Prone quad stretch 5 x 5 \" w/ manual overpressure 5 x 5 \" w/ manual overpressure     Shuttle press        SAQ  5' NMES 5' NMES Knee extension stretch 5 x 5 \" w/ manual overpressure 5 x 5 \" w/ manual overpressure   Discuss    Stool drags    10x2                                                     STANDING       LE marches with FWW       Step taps        Stair training with and without cane and use of rail       Amb w/ SPC       Slant board gastroc stretch       HR       Marching       Step up  2x10 6\"     TRX squats       Retro walking FM  4 laps 7#     Shuttle leg press    2c  10x2  R LE 2c  10x2  R LE   FM TKE   20#  10x2  3\"                                              PROPRIOCEPTION       Wobbleboard    20\"x3 lateral     Step taps on airex    20x2 12 in cone                         MODALITIES       Vaso                   Other Therapeutic Activities/Education:  Education provided to patient on sleeping arrangements, try not to sleep with pillow under knees and if do, do it vertically. Extension prop multiple times daily. Home Exercise Program:      Access Code: [email or web address]  URL: [email or web address]  Date: 11/09/2022  Prepared by: Ophelia De Leon    Exercises  Long Sitting Quad Set with Towel Roll Under Heel - 1 x daily - 7 x weekly - 3 sets - 10 reps - 5\" hold  Supine Knee Extension Strengthening - 1 x daily - 7 x weekly - 3 sets - 10 reps - 5\" hold  Supine Active Straight Leg Raise - 1 x daily - 7 x weekly - 3 sets - 10 reps  Prone Knee Flexion - 1 x daily - 7 x weekly - 3 sets - 10 reps  Seated Knee Flexion Stretch - 1 x daily - 7 x weekly - 3 sets - 30\" hold  Seated Hamstring Stretch - 1 x daily - 7 x weekly - 3 sets - 30\" hold  Prone Quadriceps Stretch with Strap - 1 x daily - 7 x weekly - 3 sets - 30\" hold        Manual Treatments:   PROM knee ext/flex, Grade III manual knee ext mobs, x12'        Modalities:      Communication with other providers:  POC sent 10/7/22    PN sent 12/14/22       Assessment: Anam Valdez has completed 19 visits since start of care on 10/7/22.  Lacks full ext; lacking 5 deg with >120 deg knee flexion. Completed closed chain activities with min weakness noted, will progress as tolerated. Demo independence with home program and min to mod pain pending activity. Will place on hold with home program and discharge in 30 days if no follow up completed. Pt agreed to this plan.         End pain: 3/10        Plan for Next Session:  Continue ROM stretches / exercises and functional strengthening, Gait training        Time In / Time Out:    0088-5477      Timed Code/Total Treatment Minutes:    45/45'    1 TE  18'   1 TA 15'     1 man 12'          Next Progress Note due:  20th         Plan of Care Interventions:  [x] Therapeutic Exercise  [x] Modalities:  [x] Therapeutic Activity     [] Ultrasound  [x] Estim  [x] Gait Training      [] Cervical Traction [] Lumbar Traction  [x] Neuromuscular Re-education    [x] Cold/hotpack [] Iontophoresis   [x] Instruction in HEP      [x] Vasopneumatic   [x] Dry Needling    [x] Manual Therapy               [] Aquatic Therapy              Electronically signed by:  Isi Cavanaugh PT, DPT, OCS    12/14/2022 8:19 AM

## 2022-12-14 NOTE — PROGRESS NOTES
Outpatient Physical Therapy           [place]           [] Phone: [phone]   Fax: [phone]  [place]           [] Phone: [phone]   Fax: [phone]      To: Eduard Hodge DO     From: Melissa Morse, PT, DPT, OCS      Patient: Kalyan Avilez                    : 1970  Diagnosis:  Primary osteoarthritis of right knee [M17.11]  Chronic pain of right knee [M25.561, G89.29]        Treatment Diagnosis:   R knee stiffness, pain     Date: 2022  [x]  Progress Note                []  Discharge Note    Evaluation Date:  10/7/22   Total Visits to date:   23 Cancels/No-shows to date:  0    Subjective:  Zelia Carrel arrives to tx session reporting 0/10 pain in R knee. Soreness after last visit. Cane use at the house at 50% with mostly in the morning. Working on home program. Soreness after prolonged ambulation at the grocery store at 6/10 pain. 60% full function per patient. Return back to Dr. Anna Vásquez on the       Plan of Care/Treatment to date:  [x] Therapeutic Exercise    [x] Modalities:  [x] Therapeutic Activity     [] Ultrasound  [] Electrical Stimulation  [x] Gait Training      [] Cervical Traction   [] Lumbar Traction  [x] Neuromuscular Re-education  [x] Cold/hotpack [] Iontophoresis  [x] Instruction in HEP      Other:  [x] Manual Therapy       []  Vasopneumatic  [] Aquatic Therapy       []   Dry Needle Therapy                      Objective/Significant Findings At Last Visit/Comments:    No pain with palpation. Min antalgic gait due to lack of TKE with use of SPC. Good quad set without increase in pain with knee supported. Lacking 4 deg ext with OP to 122 deg knee flex AROM  Extensor lag on all extension activities  4+/5 R knee flex/ext   R SLS: 4 sec without increase   TU.52 sec with cane      11.95 sec without cane  5x sit to stand: 14.82 sec   LEFS: 47/80 full function     Assessment:    Zelia Carrel has completed 19 visits since start of care on 10/7/22.  Lacks full ext; lacking 5 deg with >120 deg knee flexion. Completed closed chain activities with min weakness noted, will progress as tolerated. Demo independence with home program and min to mod pain pending activity. Will place on hold with home program and discharge in 30 days if no follow up completed. Pt agreed to this plan. Goal Status:  [x] Achieved [] Partially Achieved  [] Not Achieved     Patient goals:    Long Term Goals  Time Frame for Long Term Goals:     Long Term Goals: 6 weeks   Long Term Goal 1: Pt will demo I with HEP/symptom management. Met  Long Term Goal 2: Pt will demo normal gait mechanics with min/no deficits to ease community mobility. Not Met: decreased right TKE  Long Term Goal 3: Pt will demo 0-120 deg knee A/PROM to ease transfers. Not Met; 12/5 Extension lacking 5°  Long Term Goal 4: Pt will completed TUG <12 sec to demo improved mobility. Met: 11.95seconds   Long Term Goal 5: Pt will demo >50/80 improvement per LEFS to demo improved functional mobility. Not Met: 27 (67%)  Long Term Goal 6: Pt will demo 5x sit to stand <15 sec to demo improved LE strength and ease with transfers. Met: 14.82 seconds               Patient Status: [x] Continue per initial plan of Care, placd on hold at this time     [] Patient now discharged     [] Additional visits requested, Please re-certify for additional visits: If we are requesting more visits, we fully anticipate the patient's condition is expected to improve within the treatment timeframe we are requesting. Electronically signed by:  Shakira Rodríguez PT, DPT, OCS 12/14/2022, 8:20 AM    12/14/2022 3:51 PM   If you have any questions or concerns, please don't hesitate to call.   Thank you for your referral.    Physician Signature:______________________ Date:______ Time: ________  By signing above, therapists plan is approved by physician

## 2022-12-19 ENCOUNTER — OFFICE VISIT (OUTPATIENT)
Dept: ORTHOPEDIC SURGERY | Age: 52
End: 2022-12-19

## 2022-12-19 VITALS
HEIGHT: 70 IN | OXYGEN SATURATION: 96 % | TEMPERATURE: 97.2 F | HEART RATE: 98 BPM | WEIGHT: 232.2 LBS | RESPIRATION RATE: 15 BRPM | BODY MASS INDEX: 33.24 KG/M2

## 2022-12-19 DIAGNOSIS — Z96.651 S/P TOTAL KNEE ARTHROPLASTY, RIGHT: Primary | ICD-10-CM

## 2022-12-19 PROCEDURE — 99024 POSTOP FOLLOW-UP VISIT: CPT | Performed by: PHYSICIAN ASSISTANT

## 2022-12-19 NOTE — LETTER
[facility]  [address]  [place] [id number]  Phone: [phone]  Fax: [phone]    Abdullahi Lobo PA-C        December 19, 2022     Patient: Junior Wisdom   YOB: 1970   Date of Visit: 12/19/2022       To Whom It May Concern:    It is my medical opinion that Junior Wisdom should remain out of work until 02/06/2023.    If you have any questions or concerns, please don't hesitate to call.    Sincerely,        Abdullahi Lobo PA-C

## 2022-12-19 NOTE — PATIENT INSTRUCTIONS
Will order extension Dynasplint  Follow-up in 6 weeks to evaluate response to treatment and plan on going back to work February 6, 2023. More physical therapy ordered to work on strengthening.

## 2022-12-19 NOTE — PROGRESS NOTES
Date of surgery:   October 4th  Surgeon: Dr. Rhonda Jimenes    History:  Mr. Alexandr Colbert is here in follow up regarding his right TKA  He is doing rather well. He is having 3/10 pain. He states that his only problem is his lack of full extension of the knee. He has been trying very hard to get full extension but has not been able to get that as of yet. Physical:  Vitals:    12/19/22 1420   Pulse: 98   Resp: 15   Temp: 97.2 °F (36.2 °C)   SpO2: 96%   Weight: 232 lb 3.2 oz (105.3 kg)   Height: 5' 10\" (1.778 m)       Right knee:  Surgical incision is well-healed, no erythema, no edema. Right knee extension 5-7 degrees short of full extension  Right knee flexion: 115 degrees  No varus or valgus instability. Impression: Status post above, doing well       Plan:   Patient Instructions   Will order extension Dynasplint  Follow-up in 6 weeks to evaluate response to treatment and plan on going back to work February 6, 2023. More physical therapy ordered to work on strengthening.

## 2023-01-04 ENCOUNTER — TELEPHONE (OUTPATIENT)
Dept: ORTHOPEDIC SURGERY | Age: 53
End: 2023-01-04

## 2023-01-04 ENCOUNTER — HOSPITAL ENCOUNTER (OUTPATIENT)
Dept: PHYSICAL THERAPY | Age: 53
Setting detail: THERAPIES SERIES
Discharge: HOME | End: 2023-01-04
Payer: COMMERCIAL

## 2023-01-04 PROCEDURE — 97110 THERAPEUTIC EXERCISES: CPT

## 2023-01-04 PROCEDURE — 97140 MANUAL THERAPY 1/> REGIONS: CPT

## 2023-01-04 PROCEDURE — 97164 PT RE-EVAL EST PLAN CARE: CPT

## 2023-01-04 RX ORDER — AMOXICILLIN 500 MG/1
500 CAPSULE ORAL SEE ADMIN INSTRUCTIONS
Qty: 7 CAPSULE | Refills: 1 | Status: SHIPPED | OUTPATIENT
Start: 2023-01-04 | End: 2023-01-05

## 2023-01-04 NOTE — TELEPHONE ENCOUNTER
Patient is calling in requesting medication for his dental procedure/cleaning in 02/08/2023. Patient denies any allergies to Amoxicillin. Patient has taken amoxicillin before.

## 2023-01-06 ENCOUNTER — HOSPITAL ENCOUNTER (OUTPATIENT)
Dept: PHYSICAL THERAPY | Age: 53
Setting detail: THERAPIES SERIES
Discharge: HOME | End: 2023-01-06
Payer: COMMERCIAL

## 2023-01-06 PROCEDURE — 97140 MANUAL THERAPY 1/> REGIONS: CPT

## 2023-01-06 PROCEDURE — 97110 THERAPEUTIC EXERCISES: CPT

## 2023-01-06 NOTE — FLOWSHEET NOTE
Outpatient Physical Therapy  [place]           [x] Phone: [phone]   Fax: [phone]  [place]           [] Phone: [phone]   Fax: [phone]        Physical Therapy Daily Treatment Note  Date:  2023    Patient Name:  Sirisha Yang    :  1970  MRN: [id number]  Restrictions/Precautions: Restrictions/Precautions: Weight Bearing As Tolerated   Diagnosis:    R TKA  Primary osteoarthritis of right knee [M17.11]  Chronic pain of right knee [M25.561, G89.29]    Date of Injury/Surgery: 10/4/2022  Treatment Diagnosis:     Insurance/Certification information:  [employer] [address]   Referring Physician:  Khadra Agosto DO     PCP: Lennox Marie MD  Next Doctor Visit:    Plan of care signed (Y/N): Y  Outcome Measure: LEFS: 780 full function   Visit# / total visits:   Pain level:     0/10 stiffness    Goals:     Patient goals:    Long Term Goals  Time Frame for Long Term Goals:     Long Term Goals: 6 weeks   Long Term Goal 1: Pt will demo I with HEP/symptom management. a  Long Term Goal 2: Pt will demo normal gait mechanics with min/no deficits to ease community mobility. Long Term Goal 3: Pt will demo 0-120 deg knee A/PROM to ease transfers. Long Term Goal 4: Pt will demo >50/80 improvement per LEFS to demo improved functional mobility. Summary of Evaluation:   Pt is 46year old male s/p R TKA 10/4/22. Pt now has difficulties completing general mobility, ADLs and transfers. Pt demo deficits this date that include knee ROM, quad activation, gait mechanics, effusion and pain. Pt will benefit with PT services with progression of strength/ROM, manual, modalities to return to PLOF. Pt prior to onset of current condition had min/no pain with able to complete full ADLs and work activities. Patient received education on their current pathology and how their condition effects them with their functional activities. Patient understood discussion and questions were answered.  Patient understands their activity limitations and understands rational for treatment progression. Subjective:  Marshall Porter arrives to tx session reporting 2/10 pain in R knee. Soreness after last visit. Cane use at the house at 50% with mostly in the morning. Working on home program. Soreness after prolonged ambulation at the grocery store at 4-5/10 pain. Return back to Dr. Roderick Jama on the Jan 30th. No longer with cane use. Any changes in Ambulatory Summary Sheet? None      Objective:   COVID screening questions were asked and patient attested that there had been no contact or symptoms  No pain with palpation. Min antalgic gait due to lack of TKE without use of SPC. ER LE with gait  Good quad set without increase in pain with knee supported.       Lacking 7 deg ext 3° with OP  119 deg knee flex AROM  Extensor lag on all extension activities      Exercises: (No more than 4 columns)   Exercise/Equipment 12/14/22  #19 1/4/23   #20  1/6/23 #21           WARM UP         Nu step       Recumbent bike 5'      Star trac      TABLE      Manual therapy      *Quad set       *Ankle pumps      *Heel prop  15#  1'x3     *Sitting AAROM knee flexion stretch       Bridge   Testing     TKE      LAQ      Heel slides w/ strap      SLR      HS stretch      Prone quad stretch      Shuttle press       SAQ       Knee extension stretch Discuss      Stool drags       Prone hang   15#  30\"x3 15#  30\"x3   Cybex hamstring machine knee ext stretch   70# with red foam roll under calf with OP 20\"x4 150# 30s x4                                 STANDING      LE marches with FWW      Step taps       Stair training with and without cane and use of rail      Amb w/ SPC      Slant board gastroc stretch      HR   Off step with QS x10   Bwd lunge toe>heel with QS   X 20   Marching      Step up      TRX squats      Retro walking FM      Shuttle leg press  2c  10x2  R LE  2c  10x3  R LE   FM TKE                                          PROPRIOCEPTION Wobbleboard       Step taps on airex                         MODALITIES      Vaso                 Other Therapeutic Activities/Education:  Education provided to patient on sleeping arrangements, try not to sleep with pillow under knees and if do, do it vertically. Extension prop multiple times daily. Home Exercise Program:      Access Code: [email or web address]  URL: [email or web address]  Date: 11/09/2022  Prepared by: Savage Jason    Exercises  Long Sitting Quad Set with Towel Roll Under Heel - 1 x daily - 7 x weekly - 3 sets - 10 reps - 5\" hold  Supine Knee Extension Strengthening - 1 x daily - 7 x weekly - 3 sets - 10 reps - 5\" hold  Supine Active Straight Leg Raise - 1 x daily - 7 x weekly - 3 sets - 10 reps  Prone Knee Flexion - 1 x daily - 7 x weekly - 3 sets - 10 reps  Seated Knee Flexion Stretch - 1 x daily - 7 x weekly - 3 sets - 30\" hold  Seated Hamstring Stretch - 1 x daily - 7 x weekly - 3 sets - 30\" hold  Prone Quadriceps Stretch with Strap - 1 x daily - 7 x weekly - 3 sets - 30\" hold        Manual Treatments:           Modalities:      Communication with other providers:  POC sent 10/7/22    PN sent 12/14/22      Re-eval 1/4/23      Assessment:   Pt demonstrated good tolerance to tx with increased extension ROM post stretching today. Noted lacking extension Pt would continue to benefit from skilled therapy interventions to address remaining impairments, improve mobility and strength and progress toward goal completion while reducing risk for re-injury or further decline.       End pain: 2/10 soreness        Plan for Next Session:  Continue ROM stretches / exercises and functional strengthening, Gait training        Time In / Time Out:    9121-6899      Timed Code/Total Treatment Minutes:   24'/24'    1 TE  10'   1 man 15'        Next Progress Note due:  20th         Plan of Care Interventions:  [x] Therapeutic Exercise  [x] Modalities:  [x] Therapeutic Activity     [] Ultrasound  [x] Estim  [x] Gait Training      [] Cervical Traction [] Lumbar Traction  [x] Neuromuscular Re-education    [x] Cold/hotpack [] Iontophoresis   [x] Instruction in HEP      [x] Vasopneumatic   [x] Dry Needling    [x] Manual Therapy               [] Aquatic Therapy              Electronically signed by:  Danika Doty PTA, CLT 1/6/2023 1:54 PM

## 2023-01-10 ENCOUNTER — HOSPITAL ENCOUNTER (OUTPATIENT)
Dept: PHYSICAL THERAPY | Age: 53
Discharge: HOME | End: 2023-01-10

## 2023-01-10 NOTE — FLOWSHEET NOTE
Physical Therapy  Cancellation/No-show Note  Patient Name:  Basilia Cintron  :  1970   Date:  1/10/2023  Cancelled visits to date: 0  No-shows to date: 0    For today's appointment patient:  [x]  Cancelled  []  Rescheduled appointment  []  No-show     Reason given by patient:  []  Patient ill  []  Conflicting appointment  []  No transportation    []  Conflict with work  []  No reason given  [x]  Other:     Comments:  Patient cancels d/t eye infection     Electronically signed by:   Neela Worthington, 9:40 AM  1/10/2023

## 2023-01-13 ENCOUNTER — HOSPITAL ENCOUNTER (OUTPATIENT)
Dept: PHYSICAL THERAPY | Age: 53
Setting detail: THERAPIES SERIES
Discharge: HOME | End: 2023-01-13
Payer: COMMERCIAL

## 2023-01-13 PROCEDURE — 97110 THERAPEUTIC EXERCISES: CPT

## 2023-01-13 PROCEDURE — 97140 MANUAL THERAPY 1/> REGIONS: CPT

## 2023-01-13 NOTE — FLOWSHEET NOTE
Outpatient Physical Therapy  [place]           [x] Phone: [phone]   Fax: [phone]  [facility] [place]           [] Phone: [phone]   Fax: [phone]        Physical Therapy Daily Treatment Note  Date:  2023    Patient Name:  Ephraim Daily  \"KAITLYNN\"  :  1970  MRN: [id number]  Restrictions/Precautions: Restrictions/Precautions: Weight Bearing As Tolerated   Diagnosis:    R TKA  Primary osteoarthritis of right knee [M17.11]  Chronic pain of right knee [M25.561, G89.29]    Date of Injury/Surgery: 10/4/2022  Treatment Diagnosis:     Insurance/Certification information:  John Pineda   Referring Physician:  Doreene Rubinstein, DO     PCP: Shaylee Hinton MD  Next Doctor Visit:    Plan of care signed (Y/N): Y  Outcome Measure: LEFS:  full function   Visit# / total visits:   Pain level:     0 /10   stiffness    Goals:     Patient goals:    Long Term Goals  Time Frame for Long Term Goals:     Long Term Goals: 6 weeks   Long Term Goal 1: Pt will demo I with HEP/symptom management. a  Long Term Goal 2: Pt will demo normal gait mechanics with min/no deficits to ease community mobility. Long Term Goal 3: Pt will demo 0-120 deg knee A/PROM to ease transfers. Long Term Goal 4: Pt will demo >50/80 improvement per LEFS to demo improved functional mobility. Summary of Evaluation:   Pt is 46year old male s/p R TKA 10/4/22. Pt now has difficulties completing general mobility, ADLs and transfers. Pt demo deficits this date that include knee ROM, quad activation, gait mechanics, effusion and pain. Pt will benefit with PT services with progression of strength/ROM, manual, modalities to return to PLOF. Pt prior to onset of current condition had min/no pain with able to complete full ADLs and work activities. Patient received education on their current pathology and how their condition effects them with their functional activities. Patient understood discussion and questions were answered. Patient understands their activity limitations and understands rational for treatment progression. Subjective:  Anna Velazquez arrives to tx session reporting 0/10 pain in R knee. Soreness after last visit. Been working on straightening of the knee       Any changes in Ambulatory Summary Sheet? None      Objective:   COVID screening questions were asked and patient attested that there had been no contact or symptoms    Min antalgic gait due to lack of TKE without use of SPC. ER LE with gait  Good quad set without increase in pain with knee supported.       Lacking 5 deg ext 3° with OP  Extensor lag on all extension activities      Exercises: (No more than 4 columns)   \"KAITLYNN\"   Exercise/Equipment 12/14/22  #19 1/4/23   #20  1/6/23 #21 1/13/23  #22            WARM UP          Nu step        Recumbent bike 5'   4'    Star trac       TABLE       Manual therapy       *Quad set        *Ankle pumps       *Heel prop  15#  1'x3      *Sitting AAROM knee flexion stretch        Bridge   Testing      TKE       LAQ       Heel slides w/ strap       SLR       HS stretch       Prone quad stretch       Shuttle press        SAQ        Knee extension stretch Discuss       Stool drags        Prone hang   15#  30\"x3 15#  30\"x3 10#  1'x2   Cybex hamstring machine knee ext stretch   70# with red foam roll under calf with OP 20\"x4 150# 30s x4 110# with red foam roll under calf with OP 20\" 4x2                                      STANDING       LE marches with FWW       Step taps        Stair training with and without cane and use of rail       Amb w/ SPC       Slant board gastroc stretch       HR   Off step with QS x10    Bwd lunge toe>heel with QS   X 20    Marching    20x2   Step up       TRX squats       Retro walking FM       Shuttle leg press  2c  10x2  R LE  2c  10x3  R LE    FM TKE    Passive TKE 30# 1'x2     TKE 30# 10x2  3\"                                              PROPRIOCEPTION       Wobbleboard     Lateral 30\"x3   Step taps on airex                             MODALITIES       Vaso                   Other Therapeutic Activities/Education:  Education provided to patient on sleeping arrangements, try not to sleep with pillow under knees and if do, do it vertically. Extension prop multiple times daily. Home Exercise Program:      Access Code: [email or web address]  URL: [email or web address]  Date: 11/09/2022  Prepared by: Nasrin Forman    Exercises  Long Sitting Quad Set with Towel Roll Under Heel - 1 x daily - 7 x weekly - 3 sets - 10 reps - 5\" hold  Supine Knee Extension Strengthening - 1 x daily - 7 x weekly - 3 sets - 10 reps - 5\" hold  Supine Active Straight Leg Raise - 1 x daily - 7 x weekly - 3 sets - 10 reps  Prone Knee Flexion - 1 x daily - 7 x weekly - 3 sets - 10 reps  Seated Knee Flexion Stretch - 1 x daily - 7 x weekly - 3 sets - 30\" hold  Seated Hamstring Stretch - 1 x daily - 7 x weekly - 3 sets - 30\" hold  Prone Quadriceps Stretch with Strap - 1 x daily - 7 x weekly - 3 sets - 30\" hold        Manual Treatments:   Grade III manual knee ext mobs, x10'        Modalities:      Communication with other providers:  POC sent 10/7/22    PN sent 12/14/22      Re-eval 1/4/23      Assessment:   Pt demonstrated good tolerance to tx with increased extension ROM post stretching today. Noted lacking extension but tolerates heavy ext stretching. Educated to increase TKE with heel contact. Pt would continue to benefit from skilled therapy interventions to address remaining impairments, improve mobility and strength and progress toward goal completion while reducing risk for re-injury or further decline.       End pain: 2/10 soreness        Plan for Next Session:  Continue ROM stretches / exercises and functional strengthening, Gait training        Time In / Time Out:    4430-1349      Timed Code/Total Treatment Minutes:   4250'     2TE  29'   1 man 10'        Next Progress Note due:  20th         Plan of Care Interventions:  [x] Therapeutic Exercise  [x] Modalities:  [x] Therapeutic Activity     [] Ultrasound  [x] Estim  [x] Gait Training      [] Cervical Traction [] Lumbar Traction  [x] Neuromuscular Re-education    [x] Cold/hotpack [] Iontophoresis   [x] Instruction in HEP      [x] Vasopneumatic   [x] Dry Needling    [x] Manual Therapy               [] Aquatic Therapy              Electronically signed by:  Chandrika Acevedo PT, DPT ,OCS     1/13/2023 11:07 AM

## 2023-01-16 ENCOUNTER — HOSPITAL ENCOUNTER (OUTPATIENT)
Dept: PHYSICAL THERAPY | Age: 53
Setting detail: THERAPIES SERIES
Discharge: HOME | End: 2023-01-16
Payer: COMMERCIAL

## 2023-01-16 PROCEDURE — 97110 THERAPEUTIC EXERCISES: CPT

## 2023-01-16 PROCEDURE — 97116 GAIT TRAINING THERAPY: CPT

## 2023-01-16 NOTE — FLOWSHEET NOTE
Outpatient Physical Therapy  [place]           [x] Phone: [phone]   Fax: [phone]  [facility]           [] Phone: [phone]   Fax: [phone]        Physical Therapy Daily Treatment Note  Date:  2023    Patient Name:  Shyam Grant  \"KAITLYNN\"  :  1970  MRN: [id number]  Restrictions/Precautions: Restrictions/Precautions: Weight Bearing As Tolerated   Diagnosis:    R TKA  Primary osteoarthritis of right knee [M17.11]  Chronic pain of right knee [M25.561, G89.29]    Date of Injury/Surgery: 23 Dr. Odilon Hess  Treatment Diagnosis:     Insurance/Certification information:  [employer] [address]   Referring Physician:  Mayra Mackay DO     PCP: Ceci To MD  Next Doctor Visit:    Plan of care signed (Y/N): Y  Outcome Measure: LEFS:  full function   Visit# / total visits:   Pain level:    3/10   stiffness    Goals:     Patient goals:    Long Term Goals  Time Frame for Long Term Goals:     Long Term Goals: 6 weeks   Long Term Goal 1: Pt will demo I with HEP/symptom management. a  Long Term Goal 2: Pt will demo normal gait mechanics with min/no deficits to ease community mobility. Long Term Goal 3: Pt will demo 0-120 deg knee A/PROM to ease transfers. Long Term Goal 4: Pt will demo >50/80 improvement per LEFS to demo improved functional mobility. Summary of Evaluation:   Pt is 46year old male s/p R TKA 10/4/22. Pt now has difficulties completing general mobility, ADLs and transfers. Pt demo deficits this date that include knee ROM, quad activation, gait mechanics, effusion and pain. Pt will benefit with PT services with progression of strength/ROM, manual, modalities to return to PLOF. Pt prior to onset of current condition had min/no pain with able to complete full ADLs and work activities. Patient received education on their current pathology and how their condition effects them with their functional activities.  Patient understood discussion and questions were answered. Patient understands their activity limitations and understands rational for treatment progression. Subjective:  Pt reports his knee cramps up at night and it take all day to get it moving again. Any changes in Ambulatory Summary Sheet? None      Objective:   COVID screening questions were asked and patient attested that there had been no contact or symptoms    Large lateral sway with ambulation without heel strike or toe off with knee in flexed position, LE in ER and no hip muscular activation at arrival     Cued for proper LE neutral placement during prone hang  Lacking 15 deg ext 10° with OP  Improved ambulation with proper gait pattern with walking stick    Exercises: (No more than 4 columns)   \"KAITLYNN\"   Exercise/Equipment 12/14/22  #19 1/4/23   #20  1/6/23 #21 1/13/23  #22 1/16/23 #23             WARM UP           Nu step         Recumbent bike 5'   5' 5'    Star trac        TABLE        Manual therapy        *Quad set         *Ankle pumps        *Heel prop  15#  1'x3       *Sitting AAROM knee flexion stretch         Bridge   Testing       TKE        LAQ        Heel slides w/ strap        SLR        HS stretch        Prone quad stretch        Shuttle press         SAQ         Knee extension stretch Discuss        Stool drags         Prone hang   15#  30\"x3 15#  30\"x3 10#  1'x2 10#  1'x2   Cybex hamstring machine knee ext stretch   70# with red foam roll under calf with OP 20\"x4 150# 30s x4 110# with red foam roll under calf with OP 20\" 4x2 150# 30s x4    AP x10  Gastroc stretch 10ct x 3   Bridge w SB     2x10   Clam 1# + 2#     X10 ea. SL Abd w board     X 10 ea. LTR IR HL stretch bila     X 5  ea.            STANDING        LE marches with FWW        Step taps         Stair training with and without cane and use of rail        Amb w/ SPC        Slant board gastroc stretch        HR   Off step with QS x10  Off step with QS 2x10   Bwd lunge toe>heel with QS   X 20     Marching    20x2 Step up        TRX squats        Retro walking FM        Shuttle leg press  2c  10x2  R LE  2c  10x3  R LE     FM TKE    Passive TKE 30# 1'x2     TKE 30# 10x2  3\"             GAIT        Tandem walking fwd/retro  Use of mirror visual feedback     8 x ea. // bars  20 ftx 5 w walking stick                           PROPRIOCEPTION        Wobbleboard     Lateral 30\"x3    Step taps on airex                                 MODALITIES        Vaso                     Other Therapeutic Activities/Education:  Education provided to patient on sleeping arrangements, try not to sleep with pillow under knees and if do, do it vertically. Extension prop multiple times daily. Home Exercise Program:      Access Code: [email or web address]  URL: [email or web address]/  Date: 11/09/2022  Prepared by: Emely Stanford    Exercises  Long Sitting Quad Set with Towel Roll Under Heel - 1 x daily - 7 x weekly - 3 sets - 10 reps - 5\" hold  Supine Knee Extension Strengthening - 1 x daily - 7 x weekly - 3 sets - 10 reps - 5\" hold  Supine Active Straight Leg Raise - 1 x daily - 7 x weekly - 3 sets - 10 reps  Prone Knee Flexion - 1 x daily - 7 x weekly - 3 sets - 10 reps  Seated Knee Flexion Stretch - 1 x daily - 7 x weekly - 3 sets - 30\" hold  Seated Hamstring Stretch - 1 x daily - 7 x weekly - 3 sets - 30\" hold  Prone Quadriceps Stretch with Strap - 1 x daily - 7 x weekly - 3 sets - 30\" hold    Access Code: [email or web address]  URL: [place]/  Date: 01/16/2023  Prepared by: Disha Basilio    Exercises  Sidelying Hip Abduction - 1 x daily - 7 x weekly - 1-3 sets - 10 reps  Supine Bridge - 1 x daily - 7 x weekly - 1-3 sets - 10 reps  Clamshell - 1 x daily - 7 x weekly - 1-3 sets - 10 reps  Sidelying Reverse Clamshell - 1 x daily - 7 x weekly - 1-3 sets - 10 reps  Supine Hip Adduction Isometric with Ball - 1 x daily - 7 x weekly - 1-3 sets - 10 reps  Bridge with Heels on [employer] - 1 x daily - 7 x weekly - 1-3 sets - 10 reps  Tandem walking w AD and mirror        Manual Treatments:  x      Modalities:      Communication with other providers:  POC sent 10/7/22    PN sent 12/14/22      Re-eval 1/4/23      Assessment:   Pt demonstrated good tolerance to tx with added gait training and hip strengthening exercises. Recommended patient use walking stick to normalize gait pattern. Pt would continue to benefit from skilled therapy interventions to address remaining impairments, improve mobility and strength and progress toward goal completion while reducing risk for re-injury or further decline.       End pain: 2/10 soreness        Plan for Next Session:  Continue ROM stretches / exercises and functional strengthening, Gait training        Time In / Time Out:   1345/1440      Timed Code/Total Treatment Minutes:   55'/55'   2 TE 2 GT        Next Progress Note due:  20th         Plan of Care Interventions:  [x] Therapeutic Exercise  [x] Modalities:  [x] Therapeutic Activity     [] Ultrasound  [x] Estim  [x] Gait Training      [] Cervical Traction [] Lumbar Traction  [x] Neuromuscular Re-education    [x] Cold/hotpack [] Iontophoresis   [x] Instruction in HEP      [x] Vasopneumatic   [x] Dry Needling    [x] Manual Therapy               [] Aquatic Therapy              Electronically signed by:  Chance Guallpa PTA, CLT 1/16/2023 1:45 PM

## 2023-01-20 ENCOUNTER — HOSPITAL ENCOUNTER (OUTPATIENT)
Dept: PHYSICAL THERAPY | Age: 53
Setting detail: THERAPIES SERIES
Discharge: HOME | End: 2023-01-20
Payer: COMMERCIAL

## 2023-01-20 PROCEDURE — 97140 MANUAL THERAPY 1/> REGIONS: CPT

## 2023-01-20 PROCEDURE — 97110 THERAPEUTIC EXERCISES: CPT

## 2023-01-20 NOTE — FLOWSHEET NOTE
Outpatient Physical Therapy  [place]           [x] Phone: [phone]   Fax: [phone]  [place]           [] Phone: [phone]   Fax: [phone]        Physical Therapy Daily Treatment Note  Date:  2023    Patient Name:  Junior Wisdom  \"KAITLYNN\"  :  1970  MRN: [id number]  Restrictions/Precautions: Restrictions/Precautions: Weight Bearing As Tolerated   Diagnosis:    R TKA  Primary osteoarthritis of right knee [M17.11]  Chronic pain of right knee [M25.561, G89.29]    Date of Injury/Surgery: 23 Dr. Cohen  Treatment Diagnosis:     Insurance/Certification information:  [facility]   Referring Physician:  Irving Cohen DO     PCP: Neil Velez MD  Next Doctor Visit:    Plan of care signed (Y/N): Y  Outcome Measure: LEFS:  full function   Visit# / total visits:   Pain level:     210   stiffness    Goals:     Patient goals:    Long Term Goals  Time Frame for Long Term Goals:     Long Term Goals: 6 weeks   Long Term Goal 1: Pt will demo I with HEP/symptom management. a  Long Term Goal 2: Pt will demo normal gait mechanics with min/no deficits to ease community mobility.   Long Term Goal 3: Pt will demo 0-120 deg knee A/PROM to ease transfers.  Long Term Goal 4: Pt will demo >50/80 improvement per LEFS to demo improved functional mobility.                    Summary of Evaluation:   Pt is 52 year old male s/p R TKA 10/4/22. Pt now has difficulties completing general mobility, ADLs and transfers. Pt demo deficits this date that include knee ROM, quad activation, gait mechanics, effusion and pain. Pt will benefit with PT services with progression of strength/ROM, manual, modalities to return to PLOF. Pt prior to onset of current condition had min/no pain with able to complete full ADLs and work activities. Patient received education on their current pathology and how their condition effects them with their functional activities. Patient understood discussion and questions were  answered. Patient understands their activity limitations and understands rational for treatment progression. Subjective:  Pt reports his knee cramps up at night most nights. Increased soreness after last visit. Any changes in Ambulatory Summary Sheet? None      Objective:   COVID screening questions were asked and patient attested that there had been no contact or symptoms    Large lateral sway with ambulation without heel strike or toe off with knee in flexed position, LE in ER and no hip muscular activation at arrival     Lacking 7 deg AR OM post tx but with heavy applied OP, appears able to reach ~3 deg TKE   Improved ambulation with cues to reach near TKE with preference to maintain ~15 deg knee flexion  throughout with LE externally rotated. Exercises: (No more than 4 columns)   \"KAITLYNN\"   Exercise/Equipment 1/13/23  #22 1/16/23 #23 1/20/23   #24           WARM UP         Nu step    3' Lv3    Recumbent bike 5' 5'     Star trac      TABLE      Manual therapy      *Quad set       *Ankle pumps      *Heel prop    15#   1x2    30# dumbbell at end of treatment 1'x3   *Sitting AAROM knee flexion stretch       Bridge       TKE      LAQ      Heel slides w/ strap      SLR      HS stretch      Prone quad stretch      Shuttle press       SAQ       Knee extension stretch      Stool drags       Prone hang  10#  1'x2 10#  1'x2 15# 1'x3    30# dumbbell 1'x2   Cybex hamstring machine knee ext stretch  110# with red foam roll under calf with OP 20\" 4x2 150# 30s x4    AP x10  Gastroc stretch 10ct x 3 110# with red foam roll under calf with OP 20\" 4x2   Bridge w SB  2x10    Clam 1# + 2#  X10 ea. SL Abd w board  X 10 ea. LTR IR HL stretch bila  X 5  ea.           STANDING      LE marches with FWW      Step taps       Stair training with and without cane and use of rail      Amb w/ SPC      Slant board gastroc stretch      HR  Off step with QS 2x10    Bwd lunge toe>heel with QS      Marching 20x2     Step up TRX squats      Retro walking FM      Shuttle leg press       FM TKE Passive TKE 30# 1'x2     TKE 30# 10x2  3\"            GAIT      Tandem walking fwd/retro  Use of mirror visual feedback  8 x ea. // bars  20 ftx 5 w walking stick                      PROPRIOCEPTION      Wobbleboard  Lateral 30\"x3     Step taps on airex                         MODALITIES      Vaso                 Other Therapeutic Activities/Education:  Education provided to patient on sleeping arrangements, try not to sleep with pillow under knees and if do, do it vertically. Extension prop multiple times daily. Home Exercise Program:      Access Code: [email or web address]  URL: [employer]/  Date: 11/09/2022  Prepared by: Sandra Graham    Exercises  Long Sitting Quad Set with Towel Roll Under Heel - 1 x daily - 7 x weekly - 3 sets - 10 reps - 5\" hold  Supine Knee Extension Strengthening - 1 x daily - 7 x weekly - 3 sets - 10 reps - 5\" hold  Supine Active Straight Leg Raise - 1 x daily - 7 x weekly - 3 sets - 10 reps  Prone Knee Flexion - 1 x daily - 7 x weekly - 3 sets - 10 reps  Seated Knee Flexion Stretch - 1 x daily - 7 x weekly - 3 sets - 30\" hold  Seated Hamstring Stretch - 1 x daily - 7 x weekly - 3 sets - 30\" hold  Prone Quadriceps Stretch with Strap - 1 x daily - 7 x weekly - 3 sets - 30\" hold    Access Code: [email or web address]  URL: [employer]/  Date: 01/16/2023  Prepared by: Garrett Quintana    Exercises  Sidelying Hip Abduction - 1 x daily - 7 x weekly - 1-3 sets - 10 reps  Supine Bridge - 1 x daily - 7 x weekly - 1-3 sets - 10 reps  Clamshell - 1 x daily - 7 x weekly - 1-3 sets - 10 reps  Sidelying Reverse Clamshell - 1 x daily - 7 x weekly - 1-3 sets - 10 reps  Supine Hip Adduction Isometric with Ball - 1 x daily - 7 x weekly - 1-3 sets - 10 reps  Bridge with Heels on [employer] - 1 x daily - 7 x weekly - 1-3 sets - 10 reps  Tandem walking w AD and mirror        Manual Treatments:  Grade III mobs into ext, heavy OP into end ranges for 5-10\" intervals x12'      Modalities:      Communication with other providers:  POC sent 10/7/22    PN sent 12/14/22      Re-eval 1/4/23      Assessment:   Pt demonstrated good tolerance to tx. Increased OP into ext with improvement. Increased pain but tolerates heavy OP. Will get dynasplint for home use for knee ext. Appears capable to improve knee ext and gait and will continue to progress as tolerated. Pt would continue to benefit from skilled therapy interventions to address remaining impairments, improve mobility and strength and progress toward goal completion while reducing risk for re-injury or further decline.       End pain: 5/10 soreness        Plan for Next Session:  Continue ROM stretches / exercises and functional strengthening, Gait training        Time In / Time Out:   [id number]      Timed Code/Total Treatment Minutes:   40/40'       28' TE      12' man    Next Progress Note due:  20th         Plan of Care Interventions:  [x] Therapeutic Exercise  [x] Modalities:  [x] Therapeutic Activity     [] Ultrasound  [x] Estim  [x] Gait Training      [] Cervical Traction [] Lumbar Traction  [x] Neuromuscular Re-education    [x] Cold/hotpack [] Iontophoresis   [x] Instruction in HEP      [x] Vasopneumatic   [x] Dry Needling    [x] Manual Therapy               [] Aquatic Therapy              Electronically signed by:  Enzo Rico PT, DPT ,OCS     1/20/2023 2:32 PM

## 2023-01-30 ENCOUNTER — OFFICE VISIT (OUTPATIENT)
Dept: ORTHOPEDIC SURGERY | Age: 53
End: 2023-01-30
Payer: COMMERCIAL

## 2023-01-30 VITALS
TEMPERATURE: 97.4 F | BODY MASS INDEX: 30.11 KG/M2 | OXYGEN SATURATION: 95 % | RESPIRATION RATE: 16 BRPM | HEART RATE: 94 BPM | WEIGHT: 210.3 LBS | HEIGHT: 70 IN

## 2023-01-30 DIAGNOSIS — Z96.651 S/P TOTAL KNEE ARTHROPLASTY, RIGHT: ICD-10-CM

## 2023-01-30 DIAGNOSIS — M24.561 FLEXION CONTRACTURE OF RIGHT KNEE: Primary | ICD-10-CM

## 2023-01-30 PROCEDURE — 99212 OFFICE O/P EST SF 10 MIN: CPT | Performed by: PHYSICIAN ASSISTANT

## 2023-01-30 NOTE — PROGRESS NOTES
Date of surgery:   October 4th  Surgeon: Dr. Disha Cazares    History:  MrJo-Ann Simms is here in follow up regarding his right TKA  He states that he just recently got the Dynasplint and has used it only 2 times so he really does not know if it is done much yet but he feels like he can tolerate it fairly well and we will continue to try to use that to help get full extension. Physical:  Vitals:    01/30/23 1321   Pulse: 94   Resp: 16   Temp: 97.4 °F (36.3 °C)   SpO2: 95%   Weight: 210 lb 4.8 oz (95.4 kg)   Height: 5' 10\" (1.778 m)       Gen/Psych:Examination reveals a pleasant individual in no acute distress. The patient is oriented to time, place and person. The patient's mood and affect are appropriate. Patient appears well nourished. Body habitus is mildly overweight     Lymph:  no lymphedema in bilateral lower extremities     Skin intact in bilateral lower extremities with no ulcerations, lesions, rash, erythema. Vascular: There are no varicosities in bilateral lower extremities, sensation intact to light touch over bilateral lower extremities. Right knee:  Surgical incision is well-healed, no erythema, no edema. Right knee extension: 10 degrees short of full extension  Right knee flexion: 115 degrees  No varus or valgus instability. Strength 5 -/5 with extension, 5 -/5 flexion of the knee  Gait is mildly antalgic    Imaging studies:  3 views of the right knee taken and reviewed in the office today show a well-positioned right total knee arthroplasty with no signs of loosening or periprosthetic fracture or other complications. The official read and interpretation of these x-rays will be done by the the [facility]     Impression: Status post above, doing well       Plan:   Patient Instructions   Continue to use the Dynasplint to work on extension of the knee. Continue strengthening of the quad and hamstring muscles and the muscles around the knee.   Follow-up in 4 weeks and plan on returning to work in 6 weeks given the fact that you just got the Dynasplint and started using it.

## 2023-01-30 NOTE — LETTER
[address] [facility]  [address] [facility] [address]  Phone: [phone]  Fax: [phone]    Belkys Krause        January 30, 2023     Patient: Rosi Garcia   YOB: 1970   Date of Visit: 1/30/2023       To Whom It May Concern: It is my medical opinion that Flo Moseley should remain out of work until reevaluated in 4 weeks with an approximate return to work in 7 weeks. If you have any questions or concerns, please don't hesitate to call.     Sincerely,        Noelle Luong PA-C

## 2023-01-30 NOTE — PATIENT INSTRUCTIONS
Continue to use the Dynasplint to work on extension of the knee. Continue strengthening of the quad and hamstring muscles and the muscles around the knee. Follow-up in 4 weeks and plan on returning to work in 6 weeks given the fact that you just got the Dynasplint and started using it.

## 2023-02-09 ENCOUNTER — TELEPHONE (OUTPATIENT)
Dept: ORTHOPEDIC SURGERY | Age: 53
End: 2023-02-09

## 2023-02-09 RX ORDER — AMOXICILLIN 500 MG/1
CAPSULE ORAL
Qty: 7 CAPSULE | Refills: 0 | Status: SHIPPED | OUTPATIENT
Start: 2023-02-09

## 2023-02-09 NOTE — TELEPHONE ENCOUNTER
Patients wife called and requested and antibiotic for patient. She stated he will be getting dental work done next week.

## 2023-02-27 ENCOUNTER — OFFICE VISIT (OUTPATIENT)
Dept: ORTHOPEDIC SURGERY | Age: 53
End: 2023-02-27
Payer: COMMERCIAL

## 2023-02-27 VITALS
HEART RATE: 96 BPM | HEIGHT: 70 IN | BODY MASS INDEX: 30.82 KG/M2 | SYSTOLIC BLOOD PRESSURE: 128 MMHG | WEIGHT: 215.3 LBS | RESPIRATION RATE: 16 BRPM | DIASTOLIC BLOOD PRESSURE: 82 MMHG

## 2023-02-27 DIAGNOSIS — Z96.651 S/P TOTAL KNEE ARTHROPLASTY, RIGHT: ICD-10-CM

## 2023-02-27 DIAGNOSIS — R26.9 ABNORMAL GAIT: ICD-10-CM

## 2023-02-27 DIAGNOSIS — M24.561 FLEXION CONTRACTURE OF RIGHT KNEE: Primary | ICD-10-CM

## 2023-02-27 PROCEDURE — 99212 OFFICE O/P EST SF 10 MIN: CPT | Performed by: PHYSICIAN ASSISTANT

## 2023-02-27 NOTE — PROGRESS NOTES
Date of surgery:   October 4th  Surgeon: Dr. Chavez Aguirre    History:  Mr. Kamari Merchant is here in follow up regarding his right TKA. Michael Kelly is returning to the office to have his right knee evaluated. Pt states that he has been utilizing the [address] [facility] [address] splint and he feels he has noticed improvement. He states that he is still having pain, rated 4/10. He denies any new injuries. He feels like he has walked for so long with his feet pointing outward that now that his right knee has corrected the gait he is having to learn how to walk again almost.  He has not really started working on much strengthening as of yet. He is able to walk a little bit better than he was but he still has an abnormal gait. 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 Physical:  Vitals:    02/27/23 1327   BP: 128/82   Pulse: 96   Resp: 16   Weight: 215 lb 4.8 oz (97.7 kg)   Height: 5' 10\" (1.778 m)       Gen/Psych:Examination reveals a pleasant individual in no acute distress. The patient is oriented to time, place and person. The patient's mood and affect are appropriate. Patient appears well nourished. Body habitus is mildly overweight     Lymph:  no lymphedema in bilateral lower extremities     Skin intact in bilateral lower extremities with no ulcerations, lesions, rash, erythema. Vascular: There are no varicosities in bilateral lower extremities, sensation intact to light touch over bilateral lower extremities. Right knee:  Surgical incision is well-healed, no erythema, no edema. Right knee extension: 5  degrees short of full extension  Right knee flexion: 115 degrees  No varus or valgus instability. Strength 5 -/5 with extension, 5 -/5 flexion of the knee  Gait is antalgic and almost appears to have a Trendelenburg type gait. He has no pain within his right hip.   Right hip range of motion 40 degrees external rotation, 25 degrees internal rotation with no pain. Imaging studies:  3 views of the right knee taken and reviewed in the office today show a well-positioned right total knee arthroplasty with no signs of loosening or periprosthetic fracture or other complications. The official read and interpretation of these x-rays will be done by the the [facility]     Impression:   Diagnosis Orders   1. Flexion contracture of right knee        2. Abnormal gait        3. S/P total knee arthroplasty, right              Plan: At this point I feel that he has improved some but not to the point where he is ready to go back to work and given the demands of his job this may take another 3 months. 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 As for now I will have him start working more with the strengthening aspect of the knee and leg and continue with the Dynasplint. I will see him back in 6 weeks and reevaluate him at that time. Patient Instructions   Start strengthening exercises with the right knee  Continue with Dynasplint  Follow-up in 6 weeks.

## 2023-05-10 ENCOUNTER — OFFICE VISIT (OUTPATIENT)
Dept: ORTHOPEDIC SURGERY | Age: 53
End: 2023-05-10
Payer: COMMERCIAL

## 2023-05-10 VITALS
RESPIRATION RATE: 14 BRPM | DIASTOLIC BLOOD PRESSURE: 88 MMHG | HEIGHT: 70 IN | SYSTOLIC BLOOD PRESSURE: 140 MMHG | BODY MASS INDEX: 31.78 KG/M2 | WEIGHT: 222 LBS

## 2023-05-10 DIAGNOSIS — Z96.651 S/P TOTAL KNEE ARTHROPLASTY, RIGHT: ICD-10-CM

## 2023-05-10 DIAGNOSIS — M24.561 FLEXION CONTRACTURE OF RIGHT KNEE: Primary | ICD-10-CM

## 2023-05-10 PROCEDURE — 99212 OFFICE O/P EST SF 10 MIN: CPT | Performed by: PHYSICIAN ASSISTANT

## 2023-05-17 ASSESSMENT — ENCOUNTER SYMPTOMS
GASTROINTESTINAL NEGATIVE: 1
EYES NEGATIVE: 1
RESPIRATORY NEGATIVE: 1

## 2023-05-17 NOTE — PROGRESS NOTES
Date of surgery:   October 4th  Surgeon: Dr. Gideon Canavan    History:  Mr. Estella Kessler is here in follow up regarding his right TKA. He still uses his Dynasplint to help with the flexion contracture that he had after surgery but he only uses it about an hour a day now. He is able to do more activity compared to where he was the last time I saw him including walking over uneven surface however going up and down stairs and squatting still proved to be a challenge to the patient. 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 The limp that he had at the last time I saw him has improved greatly but by the end of the day he does notice fatigue setting in the leg and the limp increases. He continues to have some pain in his replaced knee which is improving but he rates his pain today at a 4/10. Review of Systems   Constitutional: Negative. HENT: Negative. Eyes: Negative. Respiratory: Negative. Cardiovascular: Negative. Gastrointestinal: Negative. Genitourinary: Negative. Musculoskeletal:  Positive for arthralgias and myalgias. Skin: Negative. Negative for rash and wound. Neurological: Negative. Psychiatric/Behavioral: Negative. Physical:  Vitals:    05/10/23 1527   BP: (!) 140/88   Site: Left Upper Arm   Position: Sitting   Cuff Size: Medium Adult   Resp: 14   Weight: 222 lb (100.7 kg)   Height: 5' 10\" (1.778 m)       Gen/Psych:Examination reveals a pleasant individual in no acute distress. The patient is oriented to time, place and person. The patient's mood and affect are appropriate. Patient appears well nourished. Body habitus is mildly overweight     Lymph:  no lymphedema in bilateral lower extremities     Skin intact in bilateral lower extremities with no ulcerations, lesions, rash, erythema.

## 2023-07-10 ENCOUNTER — OFFICE VISIT (OUTPATIENT)
Dept: ORTHOPEDIC SURGERY | Age: 53
End: 2023-07-10
Payer: COMMERCIAL

## 2023-07-10 VITALS — BODY MASS INDEX: 31.85 KG/M2 | RESPIRATION RATE: 17 BRPM | HEART RATE: 86 BPM | OXYGEN SATURATION: 98 % | HEIGHT: 70 IN

## 2023-07-10 DIAGNOSIS — Z96.651 S/P TOTAL KNEE ARTHROPLASTY, RIGHT: Primary | ICD-10-CM

## 2023-07-10 PROCEDURE — 99212 OFFICE O/P EST SF 10 MIN: CPT | Performed by: ORTHOPAEDIC SURGERY

## 2023-07-10 ASSESSMENT — ENCOUNTER SYMPTOMS
EYE REDNESS: 0
CHEST TIGHTNESS: 0
ABDOMINAL PAIN: 0
BACK PAIN: 0
COLOR CHANGE: 0

## 2023-07-10 NOTE — PROGRESS NOTES
Patient seen in office today for 9 month post op RT TKA DOS 10/4/22. X-rays taken today. No new concerns. 4/10 pain level today. Having trouble with stepping over objects. Stated he feels like he's ready to go back to work but not sure if he can do full days.

## 2023-07-10 NOTE — PROGRESS NOTES
Subjective:      Patient ID: Maged Butler is a 46 y.o. male. Patient seen in office today for 9 month post op RT TKA DOS 10/4/22. X-rays taken today. No new concerns. 4/10 pain level today. Having trouble with stepping over objects. Stated he feels like he's ready to go back to work but not sure if he can do full days. He comes in today for 9-month postop recheck. Overall he states that since I saw him last he has been doing much better but he does continue to have some overall fatigue in the right quadriceps. He states that he is not having any pain in the right knee and his motion is doing much better as well. Patient denies any new injury to the involved extremity/ joint, denies numbness or tingling in the involved extremity and denies fever or chills. Review of Systems   Constitutional:  Negative for activity change, chills and fever. HENT:  Negative for congestion and drooling. Eyes:  Negative for redness. Respiratory:  Negative for chest tightness. Cardiovascular:  Negative for chest pain. Gastrointestinal:  Negative for abdominal pain. Endocrine: Negative for cold intolerance and heat intolerance. Musculoskeletal:  Negative for arthralgias, back pain, gait problem, joint swelling and myalgias. Skin:  Negative for color change, pallor, rash and wound. Neurological:  Negative for weakness and numbness. Psychiatric/Behavioral:  Negative for confusion.       Past Medical History:   Diagnosis Date    Cerebral artery occlusion with cerebral infarction ([address])     \"had stroke 3/30/2020- had subarachnoid hemorrhage- dont remember my symtoms - all resolved now\"    Diabetes mellitus ([address])     dx 2020    History of short term memory loss     \"this has gotten better\"    Hyperlipidemia     Personal history of other medical treatment     per pt \"my wife and daughter have malignant hyperthermia\"    Primary osteoarthritis of right knee 10/5/2022    Subarachnoid hemorrhage ([address]) 03/2020

## 2023-07-10 NOTE — PATIENT INSTRUCTIONS
Continue weight-bearing as tolerated. Continue range of motion exercises as instructed. Ice and elevate as needed. Tylenol or Motrin for pain. Follow up in 5-10 years. We are committed to providing you the best care possible. If you receive a survey after visiting one of our offices, please take time to share your experience concerning your physician office visit. These surveys are confidential and no health information about you is shared. We are eager to improve for you and we are counting on your feedback to help make that happen.

## 2023-09-04 NOTE — H&P
Problem: At Risk for Falls  Goal: # Patient does not fall  Outcome: Outcome Met, Continue evaluating goal progress toward completion     Problem: Pressure Injury, Risk for  Goal: No new pressure injury (PI) development  Outcome: Outcome Met, Continue evaluating goal progress toward completion     Problem: Pain  Goal: #Acceptable pain level achieved/maintained at rest using NRS/Faces  Description: This goal is used for patients who can self-report.  Acceptable means the level is at or below the identified comfort/function goal.  Outcome: Outcome Met, Continue evaluating goal progress toward completion     Problem: Impaired Physical Mobility  Goal: # Bed mobility, ambulation, and ADLs are maintained or returned to baseline during hospitalization  Outcome: Outcome Met, Continue evaluating goal progress toward completion      Subjective:      Patient ID: Nydia Wan is a 48 y.o. male.     Pt is here today for right knee MRI. Pt was seeing Thomas PATRICIA. Pt states he noticed an increased pain in the right knee about 4 months ago. Pt states that pain today is a 5/10 will increase by the end of the day. Ice and ibuprofen will take the edge off. Pt states that pain is on the lateral aspect of the right knee. He does have a cyst on the lateral aspect of his knee when he applies pressure he will have increased pain. Pt denies any injury or accident to the right knee. Pt states resting does help with the pain.     He comes in today for his first visit with me. He states that over the past 4 months he has had a slowly enlarging mass along the lateral aspect of his right knee. He is also been having a dull, aching pain primarily along the lateral aspect of his right knee. Patient denies any new injury to the involved extremity/ joint, denies numbness or tingling in the involved extremity and denies fever or chills.              Review of Systems   Constitutional: Negative for activity change, chills and fever. Respiratory: Negative for chest tightness. Cardiovascular: Negative for chest pain. Musculoskeletal: Positive for arthralgias, joint swelling and myalgias. Negative for back pain and gait problem. Skin: Negative for color change, pallor, rash and wound. Neurological: Negative for weakness and numbness.         Past Medical History   No past medical history on file.        No current facility-administered medications on file prior to encounter.       Current Outpatient Medications on File Prior to Encounter   Medication Sig Dispense Refill    fenofibrate (TRICOR) 48 MG tablet Take 144 mg by mouth daily      buPROPion (WELLBUTRIN XL) 150 MG extended release tablet Take 150 mg by mouth every morning       GLYXAMBI 10-5 MG TABS TAKE 1 TABLET BY ORAL ROUTE ONCE DAILY IN THE MORNING FOR 30 DAYS      metFORMIN (GLUCOPHAGE-XR) 500 MG extended release tablet TAKE 1 TABLET BY MOUTH EVERY DAY WITH EVENING MEAL      atorvastatin (LIPITOR) 80 MG tablet TAKE 1 TABLET BY MOUTH EVERY DAY      FREESTYLE LITE strip TEST TWICE A DAY      FreeStyle Lancets MISC TEST TWICE A DAY       No Known Allergies  Past Surgical History:   Procedure Laterality Date    MYRINGOTOMY AND TYMPANOSTOMY TUBE PLACEMENT      OTHER SURGICAL HISTORY  2020    per old chart at [facility] had diagnostic angiogram for the [facility]- 3/2020, 2020 and 2020    TONSILLECTOMY  1978    VASECTOMY  2010     Social History     Socioeconomic History    Marital status:      Spouse name: Not on file    Number of children: Not on file    Years of education: Not on file    Highest education level: Not on file   Occupational History    Not on file   Social Needs    Financial resource strain: Not on file    Food insecurity     Worry: Not on file     Inability: Not on file    Transportation needs     Medical: Not on file     Non-medical: Not on file   Tobacco Use    Smoking status: Former Smoker     Packs/day: 1.00     Years: 25.00     Pack years: 25.00     Types: Cigarettes     Last attempt to quit: 3/30/2020     Years since quittin.5    Smokeless tobacco: Never Used   Substance and Sexual Activity    Alcohol use: No    Drug use: No    Sexual activity: Not on file   Lifestyle    Physical activity     Days per week: Not on file     Minutes per session: Not on file    Stress: Not on file   Relationships    Social connections     Talks on phone: Not on file     Gets together: Not on file     Attends Zoroastrianism service: Not on file     Active member of club or organization: Not on file     Attends meetings of clubs or organizations: Not on file     Relationship status: Not on file    Intimate partner violence     Fear of current or ex partner: Not on file     Emotionally abused: Not on file     Physically abused: Not on file     Forced sexual activity: Not on file   Other Topics Concern    Not on file   Social History Narrative    Not on file       Family History   Problem Relation Age of Onset    Cancer Mother         non hodgkins lymphoma    Stroke Father        Objective:   Physical Exam  Constitutional:       Appearance: He is well-developed. HENT:      Head: Normocephalic. Eyes:      Pupils: Pupils are equal, round, and reactive to light. Neck:      Musculoskeletal: Normal range of motion. Pulmonary:      Effort: Pulmonary effort is normal.   Musculoskeletal: Normal range of motion. General: Swelling and tenderness present. No deformity. Right hip: Normal.      Left hip: Normal.      Right knee: He exhibits swelling, bony tenderness and abnormal meniscus. He exhibits normal range of motion, no effusion, no ecchymosis, no deformity, no laceration, no erythema, normal alignment, no LCL laxity, normal patellar mobility and no MCL laxity. Tenderness found. Lateral joint line tenderness noted. No medial joint line, no MCL, no LCL and no patellar tendon tenderness noted. Left knee: Normal. He exhibits normal range of motion, no swelling, no effusion, no ecchymosis, no deformity, no laceration, no erythema, normal alignment, no LCL laxity, normal patellar mobility, no bony tenderness and no MCL laxity. No tenderness found. No medial joint line, no lateral joint line, no MCL, no LCL and no patellar tendon tenderness noted. Skin:     General: Skin is warm and dry. Capillary Refill: Capillary refill takes less than 2 seconds. Coloration: Skin is not pale. Findings: No erythema or rash. Neurological:      Mental Status: He is alert and oriented to person, place, and time. Right knee-Skin intact with no erythema, ecchymosis or lacerations present.   There is a 4 cm x 3 cm, freely mobile, soft, nonpulsatile mass along the lateral aspect of the right knee consistent with a large ganglion cyst.  0-140  Questionable Fabiana sign in the lateral compartment of the right knee.     MRI of the right knee from August 22, 2020 reviewed by me today in the office demonstrates a horizontal tear through the posterior horn of the lateral meniscus, there is also a large associated, multiloculated para meniscal cyst along the lateral aspect of the knee capsule, medial meniscus intact, ACL PCL intact.     BP (!) 133/92   Pulse 86   Temp 97.2 °F (36.2 °C) (Temporal)   Resp 16   Ht 5' 10\" (1.778 m)   Wt 220 lb (99.8 kg)   SpO2 97%   BMI 31.57 kg/m²     Assessment:   Right knee lateral meniscus tear  Right knee lateral ganglion cyst                Plan:   I discussed with him today his MRI findings. I explained to him that he does have a tear in his lateral meniscus as well as a large ganglion cyst.  At this point given his persistent and worsening symptoms despite conservative treatment and with his MRI findings I recommend surgical treatment. I discussed with him today performing right knee arthroscopy with partial lateral meniscectomy as well as open excision of his lateral ganglion cyst.  I explained risks, benefits, possible complications of the procedure and answered all questions for the patient. I explained postoperative rehabilitation protocol and expectations with the patient today. The patient understands and consents to the procedure. Patient will follow up with their primary care physician prior to surgical treatment for preoperative clearance. We will schedule surgery at soonest convenience. Continue weight-bearing as tolerated. Continue range of motion exercises as instructed. Ice and elevate as needed. Tylenol or Motrin for pain. Follow up in 2 weeks postop.     The patient was counseled at length about the risks of maggie Covid-19 during their perioperative period and any recovery window from their procedure.   The patient was made aware that maggie Covid-19  may worsen their prognosis for recovering from their procedure  and lend to a higher morbidity and/or mortality risk. All material risks, benefits, and reasonable alternatives including postponing the procedure were discussed. The patient does wish to proceed with the procedure at this time.       Pt seen and examined, No change in H+P.                     RAFA MENDOZA, DO

## 2025-02-12 NOTE — PROGRESS NOTES
Outpatient Physical Therapy           [place]           [] Phone: [phone]   Fax: [phone]  [facility]           [] Phone: [phone]   Fax: [phone]      To: Elizabeth Rodríguez PA-C     From: Chel Silva, PT, DPT, OCS      Patient: Nicol Monge                    : 1970  Diagnosis:  S/P total knee arthroplasty, right [Z96.651]        Treatment Diagnosis:   R knee stiffness, pain     Date: 2023  [x]  Re-eval     Evaluation Date:  10/7/22   Total Visits to date:  21 Cancels/No-shows to date:  0    Subjective:   Emanuelbrianna Paz arrives to tx session reporting 2/10 pain in R knee. Soreness after last visit. Cane use at the house at 50% with mostly in the morning. Working on home program. Soreness after prolonged ambulation at the grocery store at 4-5/10 pain. Return back to Dr. Vessie Brittle on the . No longer with cane use. Plan of Care/Treatment to date:  [x] Therapeutic Exercise    [x] Modalities:  [x] Therapeutic Activity     [] Ultrasound  [] Electrical Stimulation  [x] Gait Training      [] Cervical Traction   [] Lumbar Traction  [x] Neuromuscular Re-education  [x] Cold/hotpack [] Iontophoresis  [x] Instruction in HEP      Other:  [x] Manual Therapy       []  Vasopneumatic  [] Aquatic Therapy       []   Dry Needle Therapy                      Objective/Significant Findings At Last Visit/Comments:     No pain with palpation. Min antalgic gait due to lack of TKE without use of SPC. Good quad set without increase in pain with knee supported. Lacking 7 deg ext with OP to 119 deg knee flex AROM  Extensor lag on all extension activities  4+/5 R knee flex/ext   R SLS: 13 sec without increase   TU.52 sec with cane      11.95 sec without cane  5x sit to stand: 13.76 sec   6MWT: 1141ft without rest break   1/10 pain    LEFS: 45/80 full function     Assessment:   Emanuel Paz completed 19 visits since start of care on 10/7/22.  Returns after being on hold with ongoing deficits in gait mechanics and knee ext ROM. Functionally doing well with pain, knee flexion and strength. Lacking terminal knee ext contributing to gait mechanics alteration and deficits in tolerance. Will continue with PT services focusing on knee ext to improve function and assist return back to workn in potentially one month. Pt agreed to this plan. Goal Status:  [x] Achieved [] Partially Achieved  [] Not Achieved     Patient goals:  improve knee ext   Long Term Goals  Time Frame for Long Term Goals:     Long Term Goals: 6 weeks   Long Term Goal 1: Pt will demo I with HEP/symptom management. a  Long Term Goal 2: Pt will demo normal gait mechanics with min/no deficits to ease community mobility. Long Term Goal 3: Pt will demo 0-120 deg knee A/PROM to ease transfers. Long Term Goal 4: Pt will demo >50/80 improvement per LEFS to demo improved functional mobility. Patient Status: [x] Continue per initial plan of Care      [] Patient now discharged     [x] Additional visits requested, Please re-certify for additional visits:  2x a week for 4 weeks            If we are requesting more visits, we fully anticipate the patient's condition is expected to improve within the treatment timeframe we are requesting. Electronically signed by:  Tim Miller, PT, DPT, OCS 1/4/2023, 8:17 AM    1/4/2023 8:17 AM   If you have any questions or concerns, please don't hesitate to call.   Thank you for your referral.    Physician Signature:______________________ Date:______ Time: ________  By signing above, therapists plan is approved by physician R both bone flexinails

## (undated) DEVICE — ZIMMER® STERILE DISPOSABLE TOURNIQUET CUFF WITH PLC, DUAL PORT, SINGLE BLADDER, 30 IN. (76 CM)

## (undated) DEVICE — 2108 SERIES SAGITTAL FAN BLADE (33.0 X 0.88 X 64.0MM)

## (undated) DEVICE — Device

## (undated) DEVICE — CLASSIC CLD THER SYS

## (undated) DEVICE — SURGICAL PROCEDURE PACK TOT KNEE LF

## (undated) DEVICE — TUBING PMP L16FT MAIN DISP FOR AR-6400 AR-6475

## (undated) DEVICE — LINER SUCT CANSTR 1500CC SEMI RIG W/ POR HYDROPHOBIC SHUT

## (undated) DEVICE — COUNTER NDL 30 COUNT FOAM STRP SGL MAG

## (undated) DEVICE — GLOVE SURG SZ 7 CRM LTX FREE POLYISOPRENE POLYMER BEAD ANTI

## (undated) DEVICE — PADDING,UNDERCAST,COTTON, 4"X4YD STERILE: Brand: MEDLINE

## (undated) DEVICE — BANDAGE COMPR W4INXL5YD WHT BGE POLY COT M E WRP WV HK AND

## (undated) DEVICE — YANKAUER,FLEXIBLE HANDLE,REGLR CAPACITY: Brand: MEDLINE INDUSTRIES, INC.

## (undated) DEVICE — COVER,TABLE,44X90,STERILE: Brand: MEDLINE

## (undated) DEVICE — SUTURE FIBERWIRE SZ 5 L38IN NONABSORBABLE BLU L48MM 1/2 AR7211

## (undated) DEVICE — KNEE POSITIONING KIT: Brand: DEVON

## (undated) DEVICE — T4 HOOD

## (undated) DEVICE — INTENDED FOR TISSUE SEPARATION, AND OTHER PROCEDURES THAT REQUIRE A SHARP SURGICAL BLADE TO PUNCTURE OR CUT.: Brand: BARD-PARKER ® STAINLESS STEEL BLADES

## (undated) DEVICE — GLOVE ORANGE PI 8   MSG9080

## (undated) DEVICE — ARTHROSCOPY PACK: Brand: MEDLINE INDUSTRIES, INC.

## (undated) DEVICE — FAN SPRAY KIT: Brand: PULSAVAC®

## (undated) DEVICE — ELECTRODE ES AD CRDLSS PT RET REM POLYHESIVE

## (undated) DEVICE — SUTURE VCRL SZ 0 L36IN ABSRB VLT L36MM CT-1 1/2 CIR J346H

## (undated) DEVICE — [AGGRESSIVE PLUS CUTTER, ARTHROSCOPIC SHAVER BLADE,  DO NOT RESTERILIZE,  DO NOT USE IF PACKAGE IS DAMAGED,  KEEP DRY,  KEEP AWAY FROM SUNLIGHT]: Brand: FORMULA

## (undated) DEVICE — SUTURE ETHLN SZ 3-0 L18IN NONABSORBABLE BLK FS-1 L24MM 3/8 663H

## (undated) DEVICE — SUTURE VCRL SZ 2-0 L18IN ABSRB UD CT-1 L36MM 1/2 CIR J839D

## (undated) DEVICE — SUTURE ABSORBABLE 3-0 PS-1 18 IN UD MONOCRYL + STRATAFIX SXMP1B102

## (undated) DEVICE — DRESSING PETRO W3XL3IN OIL EMUL N ADH GZ KNIT IMPREG CELOS

## (undated) DEVICE — SYSTEM SKIN CLSR 22CM DERMBND PRINEO

## (undated) DEVICE — SOLUTION SURG PREP PVP IOD 10% 8 OZ BTL SCRB CARE

## (undated) DEVICE — PADDING CAST W6INXL4YD COT COHESIVE HND TEARABLE SPEC 100

## (undated) DEVICE — Device: Brand: STABLECUT®

## (undated) DEVICE — SUTURE VCRL SZ 1 L27IN ABSRB UD L36MM CT-1 1/2 CIR JJ40G

## (undated) DEVICE — GLOVE SURG SZ 65 CRM LTX FREE POLYISOPRENE POLYMER BEAD ANTI

## (undated) DEVICE — GLOVE ORANGE PI 7 1/2   MSG9075

## (undated) DEVICE — WEREWOLF FLOW 50 COBLATION WAND: Brand: COBLATION

## (undated) DEVICE — CHLORAPREP 26ML ORANGE

## (undated) DEVICE — MAT FLOOR ULTRA ABS 28X48IN

## (undated) DEVICE — BLADE CLIPPER GEN PURP NS

## (undated) DEVICE — Z INACTIVE USE 2660664 SOLUTION IRRIG 3000ML 0.9% SOD CHL USP UROMATIC PLAS CONT

## (undated) DEVICE — 4-PORT MANIFOLD: Brand: NEPTUNE 2

## (undated) DEVICE — PADDING CAST W6INXL4YD COT LO LINTING WYTEX

## (undated) DEVICE — APPLICATOR MEDICATED 26 CC SOLUTION HI LT ORNG CHLORAPREP

## (undated) DEVICE — DRAPE SHEET ULTRAGARD: Brand: MEDLINE

## (undated) DEVICE — Device: Brand: POWER-FLO®

## (undated) DEVICE — BANDAGE,ELASTIC,ESMARK,STERILE,6"X9',LF: Brand: MEDLINE

## (undated) DEVICE — GLOVE SURG SZ 8 L12IN THK75MIL DK GRN LTX FREE

## (undated) DEVICE — ANESTHESIA CIRCUIT ADULT-LF: Brand: MEDLINE INDUSTRIES, INC.

## (undated) DEVICE — TOWEL,OR,DSP,ST,BLUE,STD,6/PK,12PK/CS: Brand: MEDLINE

## (undated) DEVICE — TRAY EPI 25GA L3.5IN CONTAIN BPA DEHP PVC PENCAN

## (undated) DEVICE — GLOVE SURG SZ 6 THK91MIL LTX FREE SYN POLYISOPRENE ANTI

## (undated) DEVICE — BANDAGE COMPR W6INXL5YD WHT BGE POLY COT M E WRP WV HK AND

## (undated) DEVICE — CURETTE SURG FOR BNE CEM REM QUIK USE

## (undated) DEVICE — GLOVE SURG SZ 65 THK91MIL LTX FREE SYN POLYISOPRENE